# Patient Record
Sex: FEMALE | Race: WHITE | NOT HISPANIC OR LATINO | Employment: FULL TIME | URBAN - METROPOLITAN AREA
[De-identification: names, ages, dates, MRNs, and addresses within clinical notes are randomized per-mention and may not be internally consistent; named-entity substitution may affect disease eponyms.]

---

## 2017-05-10 ENCOUNTER — GENERIC CONVERSION - ENCOUNTER (OUTPATIENT)
Dept: OTHER | Facility: OTHER | Age: 47
End: 2017-05-10

## 2017-05-10 ENCOUNTER — APPOINTMENT (OUTPATIENT)
Dept: RADIATION ONCOLOGY | Facility: CLINIC | Age: 47
End: 2017-05-10
Attending: RADIOLOGY
Payer: COMMERCIAL

## 2017-05-10 PROCEDURE — 99213 OFFICE O/P EST LOW 20 MIN: CPT | Performed by: RADIOLOGY

## 2017-06-27 ENCOUNTER — ALLSCRIPTS OFFICE VISIT (OUTPATIENT)
Dept: OTHER | Facility: OTHER | Age: 47
End: 2017-06-27

## 2017-06-27 DIAGNOSIS — C50.411 MALIGNANT NEOPLASM OF UPPER-OUTER QUADRANT OF RIGHT FEMALE BREAST (HCC): ICD-10-CM

## 2017-06-27 DIAGNOSIS — Z13.220 ENCOUNTER FOR SCREENING FOR LIPOID DISORDERS: ICD-10-CM

## 2017-06-27 DIAGNOSIS — Z71.3 DIETARY COUNSELING AND SURVEILLANCE: ICD-10-CM

## 2017-06-27 DIAGNOSIS — Z00.00 ENCOUNTER FOR GENERAL ADULT MEDICAL EXAMINATION WITHOUT ABNORMAL FINDINGS: ICD-10-CM

## 2017-06-27 DIAGNOSIS — Z13.21 ENCOUNTER FOR SCREENING FOR NUTRITIONAL DISORDER: ICD-10-CM

## 2017-06-27 DIAGNOSIS — Z13.29 ENCOUNTER FOR SCREENING FOR OTHER SUSPECTED ENDOCRINE DISORDER: ICD-10-CM

## 2017-06-30 ENCOUNTER — APPOINTMENT (OUTPATIENT)
Dept: LAB | Facility: CLINIC | Age: 47
End: 2017-06-30
Payer: COMMERCIAL

## 2017-06-30 DIAGNOSIS — Z13.21 ENCOUNTER FOR SCREENING FOR NUTRITIONAL DISORDER: ICD-10-CM

## 2017-06-30 DIAGNOSIS — Z00.00 ENCOUNTER FOR GENERAL ADULT MEDICAL EXAMINATION WITHOUT ABNORMAL FINDINGS: ICD-10-CM

## 2017-06-30 DIAGNOSIS — Z13.29 ENCOUNTER FOR SCREENING FOR OTHER SUSPECTED ENDOCRINE DISORDER: ICD-10-CM

## 2017-06-30 DIAGNOSIS — Z71.3 DIETARY COUNSELING AND SURVEILLANCE: ICD-10-CM

## 2017-06-30 DIAGNOSIS — Z13.220 ENCOUNTER FOR SCREENING FOR LIPOID DISORDERS: ICD-10-CM

## 2017-06-30 DIAGNOSIS — C50.411 MALIGNANT NEOPLASM OF UPPER-OUTER QUADRANT OF RIGHT FEMALE BREAST (HCC): ICD-10-CM

## 2017-06-30 LAB
25(OH)D3 SERPL-MCNC: 26.9 NG/ML (ref 30–100)
ALBUMIN SERPL BCP-MCNC: 4 G/DL (ref 3.5–5)
ALP SERPL-CCNC: 73 U/L (ref 46–116)
ALT SERPL W P-5'-P-CCNC: 36 U/L (ref 12–78)
ANION GAP SERPL CALCULATED.3IONS-SCNC: 4 MMOL/L (ref 4–13)
AST SERPL W P-5'-P-CCNC: 20 U/L (ref 5–45)
BILIRUB SERPL-MCNC: 0.61 MG/DL (ref 0.2–1)
BUN SERPL-MCNC: 14 MG/DL (ref 5–25)
CALCIUM SERPL-MCNC: 9.1 MG/DL (ref 8.3–10.1)
CHLORIDE SERPL-SCNC: 103 MMOL/L (ref 100–108)
CHOLEST SERPL-MCNC: 203 MG/DL (ref 50–200)
CO2 SERPL-SCNC: 30 MMOL/L (ref 21–32)
CREAT SERPL-MCNC: 0.62 MG/DL (ref 0.6–1.3)
ERYTHROCYTE [DISTWIDTH] IN BLOOD BY AUTOMATED COUNT: 13.1 % (ref 11.6–15.1)
EST. AVERAGE GLUCOSE BLD GHB EST-MCNC: 105 MG/DL
GFR SERPL CREATININE-BSD FRML MDRD: >60 ML/MIN/1.73SQ M
GLUCOSE P FAST SERPL-MCNC: 95 MG/DL (ref 65–99)
HBA1C MFR BLD: 5.3 % (ref 4.2–6.3)
HCT VFR BLD AUTO: 40.9 % (ref 34.8–46.1)
HDLC SERPL-MCNC: 102 MG/DL (ref 40–60)
HGB BLD-MCNC: 13.9 G/DL (ref 11.5–15.4)
LDLC SERPL CALC-MCNC: 88 MG/DL (ref 0–100)
MAGNESIUM SERPL-MCNC: 2.4 MG/DL (ref 1.6–2.6)
MCH RBC QN AUTO: 31.5 PG (ref 26.8–34.3)
MCHC RBC AUTO-ENTMCNC: 34 G/DL (ref 31.4–37.4)
MCV RBC AUTO: 93 FL (ref 82–98)
PLATELET # BLD AUTO: 288 THOUSANDS/UL (ref 149–390)
PMV BLD AUTO: 10.8 FL (ref 8.9–12.7)
POTASSIUM SERPL-SCNC: 4.1 MMOL/L (ref 3.5–5.3)
PROT SERPL-MCNC: 7.6 G/DL (ref 6.4–8.2)
RBC # BLD AUTO: 4.41 MILLION/UL (ref 3.81–5.12)
SODIUM SERPL-SCNC: 137 MMOL/L (ref 136–145)
T3 SERPL-MCNC: 1.2 NG/ML (ref 0.6–1.8)
TRIGL SERPL-MCNC: 66 MG/DL
TSH SERPL DL<=0.05 MIU/L-ACNC: 2.29 UIU/ML (ref 0.36–3.74)
WBC # BLD AUTO: 5.54 THOUSAND/UL (ref 4.31–10.16)

## 2017-06-30 PROCEDURE — 83735 ASSAY OF MAGNESIUM: CPT

## 2017-06-30 PROCEDURE — 84443 ASSAY THYROID STIM HORMONE: CPT

## 2017-06-30 PROCEDURE — 83036 HEMOGLOBIN GLYCOSYLATED A1C: CPT

## 2017-06-30 PROCEDURE — 80061 LIPID PANEL: CPT

## 2017-06-30 PROCEDURE — 82306 VITAMIN D 25 HYDROXY: CPT

## 2017-06-30 PROCEDURE — 86376 MICROSOMAL ANTIBODY EACH: CPT

## 2017-06-30 PROCEDURE — 85027 COMPLETE CBC AUTOMATED: CPT

## 2017-06-30 PROCEDURE — 80053 COMPREHEN METABOLIC PANEL: CPT

## 2017-06-30 PROCEDURE — 36415 COLL VENOUS BLD VENIPUNCTURE: CPT

## 2017-06-30 PROCEDURE — 86800 THYROGLOBULIN ANTIBODY: CPT

## 2017-06-30 PROCEDURE — 84480 ASSAY TRIIODOTHYRONINE (T3): CPT

## 2017-07-01 LAB
THYROGLOB AB SERPL-ACNC: <1 IU/ML (ref 0–0.9)
THYROPEROXIDASE AB SERPL-ACNC: 9 IU/ML (ref 0–34)

## 2017-07-03 ENCOUNTER — GENERIC CONVERSION - ENCOUNTER (OUTPATIENT)
Dept: OTHER | Facility: OTHER | Age: 47
End: 2017-07-03

## 2017-08-16 ENCOUNTER — ALLSCRIPTS OFFICE VISIT (OUTPATIENT)
Dept: OTHER | Facility: OTHER | Age: 47
End: 2017-08-16

## 2017-09-01 ENCOUNTER — HOSPITAL ENCOUNTER (OUTPATIENT)
Dept: MAMMOGRAPHY | Facility: HOSPITAL | Age: 47
Discharge: HOME | End: 2017-09-01
Attending: SURGERY
Payer: COMMERCIAL

## 2017-09-01 DIAGNOSIS — Z12.31 ENCOUNTER FOR SCREENING MAMMOGRAM FOR MALIGNANT NEOPLASM OF BREAST: ICD-10-CM

## 2017-09-01 PROCEDURE — G0202 SCR MAMMO BI INCL CAD: HCPCS

## 2017-09-05 ENCOUNTER — ALLSCRIPTS OFFICE VISIT (OUTPATIENT)
Dept: OTHER | Facility: OTHER | Age: 47
End: 2017-09-05

## 2017-09-06 ENCOUNTER — GENERIC CONVERSION - ENCOUNTER (OUTPATIENT)
Dept: OTHER | Facility: OTHER | Age: 47
End: 2017-09-06

## 2017-09-06 ENCOUNTER — TRANSCRIBE ORDERS (OUTPATIENT)
Dept: ADMINISTRATIVE | Facility: HOSPITAL | Age: 47
End: 2017-09-06

## 2017-09-06 DIAGNOSIS — Z12.31 VISIT FOR SCREENING MAMMOGRAM: Primary | ICD-10-CM

## 2017-12-20 ENCOUNTER — ALLSCRIPTS OFFICE VISIT (OUTPATIENT)
Dept: OTHER | Facility: OTHER | Age: 47
End: 2017-12-20

## 2017-12-21 NOTE — PROGRESS NOTES
Assessment   1  Cancer of overlapping sites of right breast (174 8) ([id number])    Discussion/Summary   Discussion Summary:    I suspect that this may be related to her mammogram causing some compression of the scar with subsequent discomfort  The patient relates that she typically has pain in her right breast after the mammogram for approximately 2 weeks  I have recommended the patient to contact us should she appreciate any changes or recurrent pain in her breast       Chief Complaint   Chief Complaint Free Text Note Form: Follow up with new concern of pain in breast   mammogram performed on 9/1/2017, birads 1  History of Present Illness   Diagnosis and Staging: July 2011: Locally advanced right breast invasive ductal carcinoma, Stage III, complete pathologic response 2012 negative, NM negative, HER-2 positive, Mammaprint high-risk                  Treatment History: 2011: Neoadjuvant dose dense Adriamycin and Cytoxan followed by paclitaxel followed by Herceptin Right lumpectomy with axillary dissection                  Current Therapy: Observation             Interval History: Starting shortly after her mammogram the patient began experiencing occasional sharp pain similar to postoperatively  These have now resolved  She is not appreciate anything on clinical exam  She had already scheduled the appointment so she decided to keep it  Review of Systems   Complete Female ROS SurgOnc:      Constitutional: The patient denies new or recent history of general fatigue, no recent weight loss, no change in appetite  Eyes: No complaints of visual problems, no scleral icterus  ENT: no complaints of ear pain, no hoarseness, no difficulty swallowing,-- no tinnitus-- and-- no new masses in head, oral cavity, or neck  Cardiovascular: No complaints of chest pain, no palpitations, no ankle edema  Respiratory: No complaints of shortness of breath, no cough        Gastrointestinal: No complaints of jaundice, no bloody stools, no pale stools  Genitourinary: No complaints of dysuria, no hematuria, no nocturia, no frequent urination, no urethral discharge  Musculoskeletal: No complaints of weakness, paralysis, joint stiffness or arthralgias,  Integumentary: No complaints of rash, no new lesions  Neurological: No complaints of convulsions, no seizures, no dizziness  Hematologic/Lymphatic: No complaints of easy bruising  ROS Reviewed:    ROS reviewed  Active Problems   1  History of Axillary Lymphadenectomy   2  Cancer of overlapping sites of right breast (174 8) (C50 811)   3  History of Carcinoma of right breast, estrogen receptor negative (174 9,V86 1)     (C50 911,Z17 1)   4  Cervical dysplasia (622 10) (N87 9)   5  Encounter for follow-up surveillance of breast cancer (V67 9,V10 3) (V39,N47 1)   6  Encounter for gynecological examination (V72 31) (Z01 419)   7  Encounter for lipid screening for cardiovascular disease (V77 91,V81 2) (Z13 220,Z13 6)   8  Encounter for vitamin deficiency screening (V77 99) (Z13 21)   9  Leiomyoma of uterus (218 9) (D25 9)   10  Lichen sclerosus et atrophicus (701 0) (L90 0)   11  History of Malignant neoplasm of upper-outer quadrant of right female breast (174 4)      (C50 411)   12  Screening for hypothyroidism (V77 0) (Z13 29)   13  Visit for screening mammogram (V76 12) (Z12 31)   14  Weight loss counseling, encounter for (V65 3) (Z71 3)    Past Medical History   1  History of Age At First Period 15 Years Old (Menarche)   2  History of Bacterial vaginosis (616 10,041 9) (N76 0,B96 89)   3  History of Carcinoma of right breast, estrogen receptor negative (174 9,V86 1)     (C50 911,Z17 1)   4  History of Encounter for screening mammogram for malignant neoplasm of breast     (V76 12) (Z12 31)   5  History of backache (V13 59) (Z87 39)   6  History of malignant neoplasm of female breast (V10 3) (Z85 3)   7   History of radiation therapy (V15 3) (Z92 3)   8  History of senile atrophic vaginitis (V13 29) (Z87 42)   9  History of vaginal discharge (V13 29) (Z87 42)   10  History of vaginitis (V13 29) (Z87 42)   11  History of vaginitis (V13 29) (Z87 42)   12  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)   13  History of Malignant neoplasm of upper-outer quadrant of right female breast (174 4)      (C50 411)   14  History of Missed  (632) (O02 1)   15  History of Radiation Therapy (V15 3)   16  History of Stress incontinence, female (625 6) (N39 3)   17  History of Summary Of Previous Pregnancies  1  (Total No )   18  History of Summary Of Previous Pregnancies Para 0  (Deliveries)   19  History of Vaginal candidiasis (112 1) (B37 3)    Surgical History   1  History of Axillary Lymphadenectomy   2  History of Chemotherapeutics (V87 41)   3  History of Hysterectomy   4  History of Laparoscopy With Total Hysterectomy   · Perfomed in  for fibroids   5  History of Right Breast Lumpectomy   · 12  Surgical History Reviewed: The surgical history was reviewed and updated today  Family History   Mother    1  Family history of Coronary Artery Disease (V17 49)   2  Family history of Polyps Of The Sigmoid Colon  Father    3  Family history of Polyps Of The Sigmoid Colon  Maternal Grandmother    4  Family history of Skin Cancer (V16 8)  Maternal Grandfather    5  Family history of Colon Cancer (V16 0)   6  Family history of Polyps Of The Sigmoid Colon  Paternal Grandfather    7  Family history of Coronary Artery Disease (V17 49)   8  Family history of Diabetes Mellitus (V18 0)   9  Family history of Polyps Of The Sigmoid Colon  Family History Reviewed: The family history was reviewed and updated today  Social History    · Being A Social Drinker   · Marital History - Single   · Never A Smoker  Social History Reviewed: The social history was reviewed and is unchanged  Current Meds    1   Clobetasol Propionate 0 05 % External Cream; APPLY SPARINGLY TO AFFECTED     AREA(S) TWICE DAILY; Therapy: [id number] to (Last Rx:[id number])  Requested for: [id number] Ordered  Medication List Reviewed: The medication list was reviewed and updated today  Allergies   1  Augmentin TABS   2  Shellfish-derived Products   3  Tetracyclines  4  Adhesive Tape    Vitals   Vital Signs    Recorded: 20Dec2017 09:05AM   Temperature 97 7 F   Heart Rate 86   Respiration 16   Systolic 104   Diastolic 82   Height 4 ft 11 2 in   Weight 143 lb    BMI Calculated 28 69   BSA Calculated 1 6   O2 Saturation 98     Physical Exam        Constitutional: General appearance: The Patient is well-developed, well-nourished female who appears her stated age in no acute distress  She is pleasant and talkative  HEENT: Sclerae are anicteric  -- Mucous membranes are moist  -- Neck is supple without adenopathy  No JVD  Chest: The lungs are clear to auscultation  Cardiac: Heart is regular rate  Abdomen: Abdomen is soft, nontender without masses  Extremities: There is no clubbing or cyanosis  -- There is no edema  Neuro: Grossly nonfocal  -- Gait is normal        Lymphatic: no evidence of cervical adenopathy bilaterally  -- no evidence of axillary adenopathy bilaterally  Skin: Warm, anicteric  Additional Exam:   breasts were examined in the sitting and supine position  There are no worrisome skin lesions or nipple discharge on either side  There are no dominant masses, axillary adenopathy or supraclavicular adenopathy on either side  I do not appreciate any changes in the breast on the right side which is consistent with the patient's exam           Future Appointments      Date/Time Provider Specialty Site   09/07/2018 08:00 AM Selkregg, Marylen Corp, CRNP Surgical Oncology [facility]     End of Encounter Meds   1   Clobetasol Propionate 0 05 % External Cream; APPLY SPARINGLY TO AFFECTED     AREA(S) TWICE DAILY;      Therapy: [id number] to (Last Rx:[id number])  Requested for: [id number] Ordered    Signatures    Electronically signed by : Andre Frederick MD; Dec 20 2017  9:23AM EST                       (Author)

## 2018-01-10 NOTE — PROGRESS NOTES
Assessment    1  Cancer of overlapping sites of right breast (174 8) (C50 811)   2  Lichen sclerosus et atrophicus (701 0) (L90 0)   3  Encounter for gynecological examination (V72 31) (P01 881)    Plan  Lichen sclerosus et atrophicus    · Clobetasol Propionate 0 05 % External Cream; APPLY SPARINGLY TO  AFFECTED AREA(S) TWICE DAILY   Rx By: Elaine Garza; Dispense: 0 Days ; #:1 X 45 GM Tube; Refill: 2; For: Lichen sclerosus et atrophicus; CONSTANCE = N; Verified Transmission to [address] [place] [address] [address]; Last Updated By: [employer]; 8/16/2017 4:23:38 PM    Discussion/Summary  Breast cancer screening: breast cancer screening is managed by Dr Karry Phoenix  Chief Complaint  annual exam      History of Present Illness  HPI: hx St III Rt breast Ca, ER/ID-, HER2 +  No c/o  Prior TLH      Review of Systems    Constitutional: No fever, no chills, feels well, no tiredness, no recent weight gain or loss  Breasts: no breast pain, no breast swelling, no reddening of the breast, no breast lump, no breast itching and no nipple discharge  Gastrointestinal: no complaints of abdominal pain, no constipation, no nausea or diarrhea, no vomiting, no bloody stools  Genitourinary: no complaints of dysuria, no incontinence, no pelvic pain, no dysmenorrhea, no vaginal discharge or abnormal vaginal bleeding  Active Problems    1  History of Axillary Lymphadenectomy   2  Cancer of overlapping sites of right breast (174 8) (C50 811)   3  Carcinoma of breast upper outer quadrant, right (174 4) (C50 411)   4  Cervical dysplasia (622 10) (N87 9)   5  Encounter for lipid screening for cardiovascular disease (V77 91,V81 2) (Z13 220,Z13 6)   6  Encounter for vitamin deficiency screening (V77 99) (Z13 21)   7  Leiomyoma of uterus (218 9) (D25 9)   8  Lichen sclerosus et atrophicus (701 0) (L90 0)   9  Screening for hypothyroidism (V77 0) (Z13 29)   10  Visit for screening mammogram (V76 12) (Z12 31)   11   Weight loss counseling, encounter for (V65 3) (Z71 3)    Past Medical History    · History of Age At First Period 15 Years Old (Menarche)   · History of Bacterial vaginosis (616 10,041 9) (N76 0,B96 89)   · History of Encounter for screening mammogram for malignant neoplasm of breast  (V76 12) (Z12 31)   · History of backache (V13 59) (Z87 39)   · History of malignant neoplasm of female breast (V10 3) (Z85 3)   · History of radiation therapy (V15 3) (Z92 3)   · History of senile atrophic vaginitis (V13 29) (Z87 42)   · History of vaginal discharge (V13 29) (Z87 42)   · History of vaginitis (V13 29) (Z87 42)   · History of vaginitis (V13 29) (Z87 42)   · 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)   · History of Missed  (632) (O02 1)   · History of Radiation Therapy (V15 3)   · History of Stress incontinence, female (625 6) (N39 3)   · History of Summary Of Previous Pregnancies  1  (Total No )   · History of Summary Of Previous Pregnancies Para 0  (Deliveries)   · History of Vaginal candidiasis (112 1) (B37 3)    Surgical History    · History of Axillary Lymphadenectomy   · History of Chemotherapeutics (V87 41)   · History of Hysterectomy   · History of Laparoscopy With Total Hysterectomy   · History of Right Breast Lumpectomy    Family History  Mother    · Family history of Coronary Artery Disease (V17 49)   · Family history of Polyps Of The Sigmoid Colon  Father    · Family history of Polyps Of The Sigmoid Colon  Maternal Grandmother    · Family history of Skin Cancer (V16 8)  Maternal Grandfather    · Family history of Colon Cancer (V16 0)   · Family history of Polyps Of The Sigmoid Colon  Paternal Grandfather    · Family history of Coronary Artery Disease (V17 49)   · Family history of Diabetes Mellitus (V18 0)   · Family history of Polyps Of The Sigmoid Colon    Social History    · Being A Social Drinker   · Marital History - Single   · Never A Smoker    Allergies    1   Augmentin TABS 2  Shellfish-derived Products   3  Tetracyclines    4  Adhesive Tape    Vitals   Recorded: 81GTI0644 33:48SN   Systolic 876   Diastolic 80   Height 4 ft 11 2 in   Weight 141 lb 6 oz   BMI Calculated 28 36   BSA Calculated 1 6   LMP TLH     Physical Exam    Constitutional   General appearance: No acute distress, well appearing and well nourished  Neck   Neck: Normal, supple, trachea midline, no masses  Thyroid: Normal, no thyromegaly  Pulmonary   Respiratory effort: No increased work of breathing or signs of respiratory distress  Auscultation of lungs: Clear to auscultation  Cardiovascular   Auscultation of heart: Normal rate and rhythm, normal S1 and S2, no murmurs  Peripheral vascular exam: Normal pulses Throughout  Genitourinary   External genitalia: Normal and no lesions appreciated  Vagina: Normal, no lesions or dryness appreciated  Urethra: Normal     Urethral meatus: Normal     Bladder: Normal, soft, non-tender and no prolapse or masses appreciated  Cervix: Normal, no palpable masses  Uterus: Surgically absent  Adnexa/parametria: Normal, non-tender and no fullness or masses appreciated  Chest   Breasts: Normal and no dimpling or skin changes noted  s/p Rt lumpectomy  Abdomen   Abdomen: Normal, non-tender, and no organomegaly noted  Liver and spleen: No hepatomegaly or splenomegaly  Examination for hernias: No hernias appreciated  Lymphatic   Palpation of lymph nodes in neck, axillae, groin and/or other locations: No lymphadenopathy or masses noted  Psychiatric   Orientation to person, place, and time: Normal     Mood and affect: Normal        Future Appointments    Date/Time Provider Specialty Site   09/05/2017 02:00 PM LISA Fernando   H[facility]ology Oncology [facility] [place]   09/06/2017 08:00 AM Pily Yates MD Surgical Oncology [facility]     Signatures   Electronically signed by : Christa Wilder DO; Aug 17 2017  8:01AM EST                       (Author)

## 2018-01-12 VITALS
RESPIRATION RATE: 16 BRPM | HEART RATE: 65 BPM | BODY MASS INDEX: 28.86 KG/M2 | DIASTOLIC BLOOD PRESSURE: 70 MMHG | SYSTOLIC BLOOD PRESSURE: 118 MMHG | HEIGHT: 60 IN | OXYGEN SATURATION: 99 % | WEIGHT: 147 LBS

## 2018-01-13 VITALS
BODY MASS INDEX: 28.5 KG/M2 | DIASTOLIC BLOOD PRESSURE: 80 MMHG | WEIGHT: 141.38 LBS | HEIGHT: 59 IN | SYSTOLIC BLOOD PRESSURE: 120 MMHG

## 2018-01-13 NOTE — PROGRESS NOTES
Assessment    1  Breast cancer, female (174 9) (C50 919)    Plan  Breast cancer, female    · Follow-up visit in 6 months Evaluation and Treatment  Follow-up  Status: Hold For -  Scheduling  Requested for: [id number]   Ordered; For: Breast cancer, female; Ordered By: Luz Elena Llamas Performed:  Due: 80JPQ8020    Discussion/Summary  Discussion Summary:   A 39year old premenopausal woman with locally advanced right breast cancer, ER/NV negative HER-2 3+ disease, diagnosed in July 2011  She underwent neoadjuvant chemotherapy with a c  followed by paclitaxel and Herceptin resulting in complete pathological response followed by lumpectomy with axillary lymph node dissection, resulting in the CLEMENTINE  She is currently on observation  She has no evidence of recurrent disease, based on her symptomatology and physical examination  I recommended her to continue with surveillance  Regarding vaginal atrophy, this is likely to be related to her prior systemic chemotherapy  As well as resort, she could use low-dose estrogen-cream, since she had ER negative breast cancer  I will see her again in 6 months for routine followup  She is in agreement with my recommendations  Chief Complaint  Chief Complaint: Chief Complaint:   The patient presents to the office today with 1  Locally advanced right breast cancer, ER/NV negative, HER-2 (3+) disease diagnosed in July 2011    2  BRCA mutation negative  History of Present Illness  Previous Therapy:   1  Neoadjuvant chemotherapy with dose-dense AC x4 followed by weekly paclitaxel and Herceptin x12    2  Adjuvant Herceptin 6 mg/kg x13 cycles, completed in December 2012  Current Therapy: Observation   Disease Status: 1  Complete pathologic response to the neoadjuvant chemotherapy  2  CLEMENTINE status post lumpectomy with axillary lymph node dissection February 2012     Interval History: A 27-year-old premenopausal woman with locally advanced right breast cancer with ER/NV negative HER-2 3+ disease diagnosed in July 2011  She underwent neoadjuvant chemotherapy with a c  followed by paclitaxel and Herceptin resulting in complete pathological response  Since she was negative for BRCA mutation, she chose to have lumpectomy followed by radiation therapy  She is currently on observation  She came in today for a routine followup  She had relatively frequent vaginal infection, due to the atrophy  She was recommended to use estrogen-containing, which she declined  She is using alternative natural treatment with which she is doing well  Otherwise, she has no new complaints  She denied bone pain  She has minimal hot flashes  Her weight is stable  She has no respiratory symptoms such as cough, sputum production or shortness of breath  Her last mammography in August 2015 was benign  Review of Systems  Complete-Female:   Constitutional: No fever, no chills, feels well, no tiredness, no recent weight gain or weight loss  Eyes: No complaints of eye pain, no red eyes, no eyesight problems, no discharge, no dry eyes, no itching of eyes  ENT: no complaints of earache, no loss of hearing, no nose bleeds, no nasal discharge, no sore throat, no hoarseness  Cardiovascular: No complaints of slow heart rate, no fast heart rate, no chest pain, no palpitations, no leg claudication, no lower extremity edema  Respiratory: No complaints of shortness of breath, no wheezing, no cough, no SOB on exertion, no orthopnea, no PND  Gastrointestinal: No complaints of abdominal pain, no constipation, no nausea or vomiting, no diarrhea, no bloody stools  Genitourinary: No complaints of dysuria, no incontinence, no pelvic pain, no dysmenorrhea, no vaginal discharge or bleeding  Musculoskeletal: No complaints of arthralgias, no myalgias, no joint swelling or stiffness, no limb pain or swelling  Integumentary: No complaints of skin rash or lesions, no itching, no skin wounds, no breast pain or lump     Neurological: No complaints of headache, no confusion, no convulsions, no numbness, no dizziness or fainting, no tingling, no limb weakness, no difficulty walking  Psychiatric: Not suicidal, no sleep disturbance, no anxiety or depression, no change in personality, no emotional problems  Endocrine: No complaints of proptosis, no hot flashes, no muscle weakness, no deepening of the voice, no feelings of weakness  Hematologic/Lymphatic: No complaints of swollen glands, no swollen glands in the neck, does not bleed easily, does not bruise easily  ROS Reviewed:   ROS reviewed  Active Problems    1  Atrophic vaginitis (627 3) (N95 2)   2  History of Axillary Lymphadenectomy   3  Backache (724 5) (M54 9)   4  Breast cancer, female (174 9) (C50 919)   5  Cervical dysplasia (622 10) (N87 9)   6  Leiomyoma of uterus (218 9) (D25 9)   7  Lichen sclerosus et atrophicus (701 0) (L90 0)   8  Vaginal candidiasis (112 1) (B37 3)   9  Vaginal discharge (623 5) (N89 8)   10  Vaginitis (616 10) (N76 0)    Past Medical History    1  History of Age At First Period 15 Years Old (Menarche)   2  History of Bacterial vaginosis (616 10,041 9) (N76 0,A49 9)   3  History of malignant neoplasm of female breast (V10 3) (Z85 3)   4  History of vaginitis (V13 29) (Z87 42)   5  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)   6  History of Missed  (632) (O02 1)   7  History of Summary Of Previous Pregnancies  1  (Total No )   8  History of Summary Of Previous Pregnancies Para 0  (Deliveries)    Surgical History    1  History of Axillary Lymphadenectomy   2  History of Chemotherapeutics (V87 41)   3  History of Hysterectomy   4  History of Radiation Therapy    Family History    1  Family history of Coronary Artery Disease (V17 49)   2  Family history of Polyps Of The Sigmoid Colon    3  Family history of Polyps Of The Sigmoid Colon    4  Family history of Skin Cancer (V16 8)    5   Family history of Colon Cancer (V16 0) 6  Family history of Polyps Of The Sigmoid Colon    7  Family history of Coronary Artery Disease (V17 49)   8  Family history of Diabetes Mellitus (V18 0)   9  Family history of Polyps Of The Sigmoid Colon    Social History    · Being A Social Drinker   · Marital History - Single   · Never A Smoker    Current Meds   1  No Reported Medications Recorded    Allergies    1  Augmentin TABS   2  Shellfish-derived Products   3  Tetracyclines    4  Adhesive Tape    Vitals  Vital Signs [Data Includes: Current Encounter]    Recorded: 42HOQ7275 08:15AM   Temperature 97 1 F   Heart Rate 78   Respiration 18   Systolic 015   Diastolic 74   Height 4 ft 11 5 in   Weight 146 lb    BMI Calculated 29   BSA Calculated 1 62     Physical Exam    Constitutional   General appearance: No acute distress, well appearing and well nourished  Eyes   Conjunctiva and lids: No swelling, erythema or discharge  Pupils and irises: Equal, round and reactive to light  Ears, Nose, Mouth, and Throat   External inspection of ears and nose: Normal     Otoscopic examination: Tympanic membranes translucent with normal light reflex  Canals patent without erythema  Nasal mucosa, septum, and turbinates: Normal without edema or erythema  Oropharynx: Normal with no erythema, edema, exudate or lesions  Pulmonary   Respiratory effort: No increased work of breathing or signs of respiratory distress  Auscultation of lungs: Clear to auscultation  Cardiovascular   Palpation of heart: Normal PMI, no thrills  Auscultation of heart: Normal rate and rhythm, normal S1 and S2, without murmurs  Examination of extremities for edema and/or varicosities: Normal     Carotid pulses: Normal     Abdomen   Abdomen: Non-tender, no masses  Liver and spleen: No hepatomegaly or splenomegaly  Lymphatic   Palpation of lymph nodes in neck: No lymphadenopathy      Musculoskeletal   Gait and station: Normal     Digits and nails: Normal without clubbing or cyanosis  Inspection/palpation of joints, bones, and muscles: Normal     Skin   Skin and subcutaneous tissue: Normal without rashes or lesions  Neurologic   Cranial nerves: Cranial nerves 2-12 intact  Reflexes: 2+ and symmetric  Sensation: No sensory loss  Psychiatric   Orientation to person, place, and time: Normal     Mood and affect: Normal     Additional Exam:  Breast exam; lumpectomy scar in the outer upper quadrant of the right breast with no palpable abnormalities  Left breast exam is negative  Diabetic Foot Screen: Normal       ECOG 0       Results/Data  Results Form:   Results   Radiology CT scan of abdomen and pelvis in November 2014 showed no evidence of disease  Mammography in August 2015 was benign, BI-RAD 2        Future Appointments    Date/Time Provider Specialty Site   03/03/2016 08:00 AM Arlette Jones MD Surgical Oncology [facility]     Signatures   Electronically signed by : LISA Gutierrez ; Jan 19 2016  8:35AM EST                       (Author)

## 2018-01-13 NOTE — RESULT NOTES
Discussion/Summary   all blood work is acceptable  all thyroid studies normal  vit d slightly low=26  multi vit should be enough     Verified Results  (1) COMPREHENSIVE METABOLIC PANEL [id number] 15:92MH Rose Rose    Order Number: [id number]     Test Name Result Flag Reference   SODIUM 137 mmol/L  136-145   POTASSIUM 4 1 mmol/L  3 5-5 3   CHLORIDE 103 mmol/L  100-108   CARBON DIOXIDE 30 mmol/L  21-32   ANION GAP (CALC) 4 mmol/L  4-13   BLOOD UREA NITROGEN 14 mg/dL  5-25   CREATININE 0 62 mg/dL  0 60-1 30   Standardized to IDMS reference method   CALCIUM 9 1 mg/dL  8 3-10 1   BILI, TOTAL 0 61 mg/dL  0 20-1 00   ALK PHOSPHATAS 73 U/L     ALT (SGPT) 36 U/L  12-78   AST(SGOT) 20 U/L  5-45   ALBUMIN 4 0 g/dL  3 5-5 0   TOTAL PROTEIN 7 6 g/dL  6 4-8 2   eGFR Non-African American      >60 0 ml/min/1 73sq [facility] [employer] Disease Education Program recommendations are as follows:  GFR calculation is accurate only with a steady state creatinine  Chronic Kidney disease less than 60 ml/min/1 73 sq  meters  Kidney failure less than 15 ml/min/1 73 sq  meters  GLUCOSE FASTING 95 mg/dL  65-99     (1) LIPID PANEL, FASTING 30Jun2017 08:29AM Jemima Bellamyabhay Order Number: [id number]     Test Name Result Flag Reference   CHOLESTEROL 203 mg/dL H    HDL,DIRECT 102 mg/dL H 40-60   Specimen collection should occur prior to Metamizole administration due to the potential for falsely depressed results  LDL CHOLESTEROL CALCULATED 88 mg/dL  0-100   Triglyceride:         Normal              <150 mg/dl       Borderline High    150-199 mg/dl       High               200-499 mg/dl       Very High          >499 mg/dl  Cholesterol:         Desirable        <200 mg/dl      Borderline High  200-239 mg/dl      High             >239 mg/dl  HDL Cholesterol:        High    >59 mg/dL      Low     <41 mg/dL  LDL CALCULATED:    This screening LDL is a calculated result    It does not have the accuracy of the Direct Measured LDL in the monitoring of patients with hyperlipidemia and/or statin therapy  Direct Measure LDL (IBJ187) must be ordered separately in these patients  TRIGLYCERIDES 66 mg/dL  <=150   Specimen collection should occur prior to N-Acetylcysteine or Metamizole administration due to the potential for falsely depressed results  (1) CBC/ PLT (NO DIFF) [id number] 08:29AM [facility] Order Number: [id number]     Test Name Result Flag Reference   HEMATOCRIT 40 9 %  34 8-46 1   HEMOGLOBIN 13 9 g/dL  11 5-15 4   MCHC 34 0 g/dL  31 4-37 4   MCH 31 5 pg  26 8-34 3   MCV 93 fL  82-98   PLATELET COUNT 536 Thousands/uL  149-390   RBC COUNT 4 41 Million/uL  3 81-5 12   RDW 13 1 %  11 6-15 1   WBC COUNT 5 54 Thousand/uL  4 31-10 16   MPV 10 8 fL  8 9-12 7     (1) VITAMIN D 25-HYDROXY 30Jun2017 08:29AM Mg Dhillon    Order Number: [id number]     Test Name Result Flag Reference   VIT D 25-HYDROX 26 9 ng/mL L 30 0-100 0   This assay is a certified procedure of the CDC Vitamin D Standardization Certification Program (VDSCP)     Deficiency <20ng/ml   Insufficiency 20-30ng/ml   Sufficient  ng/ml     *Patients undergoing fluorescein dye angiography may retain small amounts of fluorescein in the body for 48-72 hours post procedure  Samples containing fluorescein can produce falsely elevated Vitamin D values  If the patient had this procedure, a specimen should be resubmitted post fluorescein clearance  (1) HEMOGLOBIN A1C 30Jun2017 08:29AM [facility] Order Number: [id number]     Test Name Result Flag Reference   HEMOGLOBIN A1C 5 3 %  4 2-6 3   EST  AVG   GLUCOSE 105 mg/dl       (1) TSH WITH FT4 REFLEX 30Jun2017 08:29AM Mg Dhillon    Order Number: [id number]     Test Name Result Flag Reference   TSH 2 290 uIU/mL  0 358-3 740   Patients undergoing fluorescein dye angiography may retain small amounts of fluorescein in the body for 48-72 hours post procedure  Samples containing fluorescein can produce falsely depressed TSH values  If the patient had this procedure,a specimen should be resubmitted post fluorescein clearance            The recommended reference ranges for TSH during pregnancy are as follows:  First trimester 0 1 to 2 5 uIU/mL  Second trimester  0 2 to 3 0 uIU/mL  Third trimester 0 3 to 3 0 uIU/m     (1) T3 TOTAL 30Jun2017 08:29AM Sindi WOODY Order Number: [id number]     Test Name Result Flag Reference   T3 1 2 ng/mL  0 6-1 8     (1) THYROID ANTIBODIES PANEL 30Jun2017 08:29AM Ashok Godinez Order Number: [id number]     Test Name Result Flag Reference   THYROGLOB AB <1 0 IU/mL  0 0 - 0 9   Thyroglobulin Antibody measured by [facility]    Performed at:  [facility] [address]  [id number]  : Darlyn Cancino MD, Phone:  [phone]   [facility] IU/mL  0 - 34   Performed at:  [facility] [address]  [id number]  : Darlyn Cancino MD, Phone:  [phone]     (1) MAGNESIUM 20DOM1886 08:29AM Ashok Godinez Order Number: [id number]     Test Name Result Flag Reference   MAGNESIUM 2 4 mg/dL  1 6-2 6       Signatures   Electronically signed by : VLADIMIR Felipe; Jul  3 2017 11:11AM EST                       (Author)

## 2018-01-14 VITALS
BODY MASS INDEX: 28.63 KG/M2 | HEIGHT: 59 IN | RESPIRATION RATE: 16 BRPM | HEART RATE: 72 BPM | OXYGEN SATURATION: 98 % | WEIGHT: 142 LBS | TEMPERATURE: 98.9 F | DIASTOLIC BLOOD PRESSURE: 82 MMHG | SYSTOLIC BLOOD PRESSURE: 120 MMHG

## 2018-01-15 NOTE — PROGRESS NOTES
Assessment    1  Encounter for preventive health examination (V70 0) (Z00 00)   2  Weight loss counseling, encounter for (V65 3) (Z71 3)   3  Encounter for vitamin deficiency screening (V77 99) (Z13 21)   4  Encounter for lipid screening for cardiovascular disease (V77 91,V81 2) (Z13 220,Z13 6)   5  Screening for hypothyroidism (V77 0) (Z13 29)    Plan  Carcinoma of breast upper outer quadrant, right, Encounter for lipid screening for  cardiovascular disease, Health Maintenance, Encounter for vitamin deficiency screening,  Screening for hypothyroidism, Weight loss counseling, encounter for    · (1) CBC/ PLT (NO DIFF); Status:Active; Requested AHW:[id number];    · (1) COMPREHENSIVE METABOLIC PANEL; Status:Active; Requested TEU:[id number];    · (1) HEMOGLOBIN A1C; Status:Active; Requested FKL:[id number];    · (1) LIPID PANEL, FASTING; Status:Active; Requested LHY:25YRM6796;    · (1) MAGNESIUM; Status:Active; Requested ANIYA:27PSI1841;    · (1) T3 TOTAL; Status:Active; Requested VEC:49GPC5619;    · (1) THYROID ANTIBODIES PANEL; Status:Active; Requested AFO:64MUG1657;    · (1) TSH WITH FT4 REFLEX; Status:Active; Requested LCV:[id number];    · (1) VITAMIN D 25-HYDROXY; Status:Active; Requested KHL:[id number]; Health Maintenance    · Follow Up After Tests Complete Evaluation and Treatment  Follow-up  Status: Hold For -  Scheduling  Requested for: 27Jun2017   · Follow Up in 2 Years Evaluation and Treatment  Follow-up  Status: Hold For - Scheduling   Requested for: 27Jun2017   · Drink plenty of fluids ; Status:Complete;   Done: 04CJG3047   · Eat a low fat and low cholesterol diet ; Status:Complete;   Done: 70LPB4549   · Keep a diary of when and what you eat ; Status:Complete;   Done: 92UJW4274   · Some eating tips that can help you lose weight ; Status:Complete;   Done: 69HNI6281   · There are ways to decrease your stress and improve your sense of well-being  We  encourage you to keep active and exercise regularly    Make time to take care of yourself  and participate in activities that you enjoy  Stay connected to friends and family that can  support and comfort you  If at any time you have thoughts of harming yourself or  someone else, contact us immediately ; Status:Active; Requested JAJA:49WNK4859;    · Vitamins can help you get daily requirements that your diet may not be giving you ;  Status:Complete;   Done: 58BXD4274   · Call [phone] if: You have any warning signs of skin cancer ; Status:Complete;    Done: 22JPO1283  Weight loss counseling, encounter for    · *1 - [facility]  *  Status: Active  Requested  for: 31Fta3813  Care Summary provided  : Yes   · 1 - Batool Hodges MD, Colan Ormond (Internal Medicine) Co-Management  *  Status: Active  Requested  for: 58HKY5344  Care Summary provided  : Yes    Discussion/Summary  health maintenance visit Currently, she eats an adequate diet and has an adequate exercise regimen  the risks and benefits of cervical cancer screening were discussed cervical cancer screening is current cervical cancer screening is managed by Dr Riri Londono Breast cancer screening: the risks and benefits of breast cancer screening were discussed, mammogram is current and breast cancer screening is managed by Dr Sharita Tran  Advice and education were given regarding nutrition, aerobic exercise and weight loss  Patient discussion: discussed with the patient  The patient was counseled regarding instructions for management, impressions, risks and benefits of treatment options, importance of compliance with treatment  The treatment plan was reviewed with the patient/guardian  The patient/guardian understands and agrees with the treatment plan      Chief Complaint  PE see's eye   regularly requesting labs, discuss weight loss  ck/lpn      History of Present Illness  HM, Adult Female: The patient is being seen for a health maintenance evaluation  The last health maintenance visit was unsure  General Health: The patient's health since the last visit is described as good  She has regular dental visits  She denies vision problems  She denies hearing loss  Immunizations status: up to date  Lifestyle:  She consumes a diverse and healthy diet  She is on a low fat and low carbohydrate diet and is generally adherent  She has weight concerns  She exercises regularly  She exercises 7 times per week for 30 or more minutes per session  Exercise includes walking and swimming  She does not use tobacco  She denies alcohol use  She denies drug use  unable to loose weight  Reproductive health:  she reports normal menses  surgical menopause  Screening: cancer screening reviewed and current  Cervical cancer screening includes a pap smear performed last year  Breast cancer screening includes a mammogram performed last year  She hasn't been previously screened for colorectal cancer  metabolic screening reviewed and current  Metabolic screening includes uncertain timing of her last lipid profile, uncertain timing of her last glucose screening and uncertain timing of her last thyroid function test    risk screening reviewed and current  Cardiovascular risk factors: obesity, but no stress, no tobacco use and no sedentary lifestyle  General health risks: previous breast cancer, abnormal cervical cytology and s/p chemo, radiation  Safety elements used: seat belt and safe driving habits  Risk assessments performed include depression symptoms  Risk findings: no depression symptoms  Review of Systems    Constitutional: No fever, no chills, feels well, no tiredness, no recent weight gain or weight loss  Eyes: No complaints of eye pain, no red eyes, no eyesight problems, no discharge, no dry eyes, no itching of eyes  ENT: no complaints of earache, no loss of hearing, no nose bleeds, no nasal discharge, no sore throat, no hoarseness     Cardiovascular: No complaints of slow heart rate, no fast heart rate, no chest pain, no palpitations, no leg claudication, no lower extremity edema  Respiratory: No complaints of shortness of breath, no wheezing, no cough, no SOB on exertion, no orthopnea, no PND  Gastrointestinal: No complaints of abdominal pain, no constipation, no nausea or vomiting, no diarrhea, no bloody stools  Genitourinary: No complaints of dysuria, no incontinence, no pelvic pain, no dysmenorrhea, no vaginal discharge or bleeding  Musculoskeletal: No complaints of arthralgias, no myalgias, no joint swelling or stiffness, no limb pain or swelling  Integumentary: No complaints of skin rash or lesions, no itching, no skin wounds, no breast pain or lump  Neurological: No complaints of headache, no confusion, no convulsions, no numbness, no dizziness or fainting, no tingling, no limb weakness, no difficulty walking  Psychiatric: Not suicidal, no sleep disturbance, no anxiety or depression, no change in personality, no emotional problems  Endocrine: No complaints of proptosis, no hot flashes, no muscle weakness, no deepening of the voice, no feelings of weakness  Hematologic/Lymphatic: No complaints of swollen glands, no swollen glands in the neck, does not bleed easily, does not bruise easily  Active Problems    1  History of Axillary Lymphadenectomy   2  Cancer of overlapping sites of right breast (174 8) (C50 811)   3  Carcinoma of breast upper outer quadrant, right (174 4) (C50 411)   4  Cervical dysplasia (622 10) (N87 9)   5  Leiomyoma of uterus (218 9) (D25 9)   6   Visit for screening mammogram (V76 12) (Z12 31)    Past Medical History    · History of Age At First Period 15 Years Old (Menarche)   · History of Bacterial vaginosis (616 10,041 9) (N76 0,B96 89)   · History of Encounter for gynecological examination (V72 31) (Z01 419)   · History of Encounter for screening mammogram for malignant neoplasm of breast  (V76 12) (Z12 31)   · History of backache (V13 59) (Z87 39)   · History of malignant neoplasm of female breast (V10 3) (Z85 3)   · History of radiation therapy (V15 3) (Z92 3)   · History of senile atrophic vaginitis (V13 29) (Z87 42)   · History of vaginal discharge (V13 29) (Z87 42)   · History of vaginitis (V13 29) (Z87 42)   · History of vaginitis (V13 29) (Z87 42)   · 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)   · 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)   · History of Missed  (632) (O02 1)   · History of Radiation Therapy (V15 3)   · History of Stress incontinence, female (625 6) (N39 3)   · History of Summary Of Previous Pregnancies  1  (Total No )   · History of Summary Of Previous Pregnancies Para 0  (Deliveries)   · History of Vaginal candidiasis (112 1) (B37 3)    Surgical History    · History of Axillary Lymphadenectomy   · History of Chemotherapeutics (V87 41)   · History of Hysterectomy   · History of Laparoscopy With Total Hysterectomy   · History of Right Breast Lumpectomy    Family History  Mother    · Family history of Coronary Artery Disease (V17 49)   · Family history of Polyps Of The Sigmoid Colon  Father    · Family history of Polyps Of The Sigmoid Colon  Maternal Grandmother    · Family history of Skin Cancer (V16 8)  Maternal Grandfather    · Family history of Colon Cancer (V16 0)   · Family history of Polyps Of The Sigmoid Colon  Paternal Grandfather    · Family history of Coronary Artery Disease (V17 49)   · Family history of Diabetes Mellitus (V18 0)   · Family history of Polyps Of The Sigmoid Colon    Social History    · Being A Social Drinker   · Marital History - Single   · Never A Smoker    Current Meds   1  No Reported Medications Recorded    Allergies    1  Augmentin TABS   2  Shellfish-derived Products   3  Tetracyclines    4   Adhesive Tape    Vitals   Recorded: 98EGK8368 04:57PM   Temperature 98 1 F, Tympanic   Heart Rate 78, L Radial   Pulse Quality Normal, L Radial   Respiration Quality Normal Respiration 16   Systolic 852, LUE, Sitting   Diastolic 72, LUE, Sitting   Height 4 ft 11 5 in   Weight 146 lb    BMI Calculated 29   BSA Calculated 1 62     Physical Exam    Constitutional   General appearance: No acute distress, well appearing and well nourished  Head and Face   Head and face: Normal     Eyes   Conjunctiva and lids: No swelling, erythema or discharge  Pupils and irises: Equal, round, reactive to light  Ears, Nose, Mouth, and Throat   Otoscopic examination: Tympanic membranes translucent with normal light reflex  Canals patent without erythema  Nasal mucosa, septum, and turbinates: Normal without edema or erythema  Lips, teeth, and gums: Normal, good dentition  Oropharynx: Normal with no erythema, edema, exudate or lesions  Neck   Thyroid: Abnormal   The thyroid was diffusely enlarged and was soft, but did not have a bruit  There were no thyroid nodules  Pulmonary   Respiratory effort: No increased work of breathing or signs of respiratory distress  Auscultation of lungs: Clear to auscultation  Cardiovascular   Auscultation of heart: Normal rate and rhythm, normal S1 and S2, no murmurs  Pedal pulses: 2+ bilaterally  Examination of extremities for edema and/or varicosities: Normal     Abdomen   Abdomen: Non-tender, no masses  Musculoskeletal   Gait and station: Normal     Range of motion: Normal     Stability: Normal     Muscle strength/tone: Normal     Skin   Skin and subcutaneous tissue: Normal without rashes or lesions  Psychiatric   Judgment and insight: Normal     Mood and affect: Normal        Results/Data  PHQ-2 Adult Depression Screening 27Jun2017 05:34PM Elaine Hayden     Test Name Result Flag Reference   PHQ-2 Adult Depression Score 0     Over the last two weeks, how often have you been bothered by any of the following problems?   Little interest or pleasure in doing things: Not at all - 0  Feeling down, depressed, or hopeless: Not at all - 0 PHQ-2 Adult Depression Screening Negative         Future Appointments    Date/Time Provider Specialty Site   08/23/2017 08:00 AM LISA Shelton   Hematology Oncology [facility] [place]   09/06/2017 08:00 AM Anastacia Bradley MD Surgical Oncology [facility]     Signatures   Electronically signed by : VLADIMIR Colbert; Jun 27 2017  5:39PM EST                       (Author)    Electronically signed by : LISA Rodriguez ; Jun 27 2017  7:52PM EST                       (Author)

## 2018-01-22 VITALS
WEIGHT: 146 LBS | DIASTOLIC BLOOD PRESSURE: 72 MMHG | BODY MASS INDEX: 28.66 KG/M2 | RESPIRATION RATE: 16 BRPM | HEIGHT: 60 IN | SYSTOLIC BLOOD PRESSURE: 120 MMHG | HEART RATE: 78 BPM | TEMPERATURE: 98.1 F

## 2018-01-22 VITALS
RESPIRATION RATE: 16 BRPM | HEIGHT: 59 IN | BODY MASS INDEX: 28.43 KG/M2 | HEART RATE: 70 BPM | DIASTOLIC BLOOD PRESSURE: 80 MMHG | TEMPERATURE: 98 F | WEIGHT: 141 LBS | SYSTOLIC BLOOD PRESSURE: 122 MMHG

## 2018-01-22 VITALS
BODY MASS INDEX: 28.83 KG/M2 | TEMPERATURE: 97.7 F | SYSTOLIC BLOOD PRESSURE: 124 MMHG | HEART RATE: 86 BPM | DIASTOLIC BLOOD PRESSURE: 82 MMHG | RESPIRATION RATE: 16 BRPM | WEIGHT: 143 LBS | HEIGHT: 59 IN | OXYGEN SATURATION: 98 %

## 2018-09-05 ENCOUNTER — HOSPITAL ENCOUNTER (OUTPATIENT)
Dept: MAMMOGRAPHY | Facility: HOSPITAL | Age: 48
Discharge: HOME | End: 2018-09-05
Payer: COMMERCIAL

## 2018-09-05 DIAGNOSIS — Z12.31 ENCOUNTER FOR SCREENING MAMMOGRAM FOR MALIGNANT NEOPLASM OF BREAST: ICD-10-CM

## 2018-09-05 PROCEDURE — 77067 SCR MAMMO BI INCL CAD: CPT

## 2018-09-07 ENCOUNTER — OFFICE VISIT (OUTPATIENT)
Dept: SURGICAL ONCOLOGY | Facility: CLINIC | Age: 48
End: 2018-09-07
Payer: COMMERCIAL

## 2018-09-07 VITALS
SYSTOLIC BLOOD PRESSURE: 120 MMHG | TEMPERATURE: 97.9 F | BODY MASS INDEX: 23.39 KG/M2 | RESPIRATION RATE: 16 BRPM | WEIGHT: 116 LBS | DIASTOLIC BLOOD PRESSURE: 80 MMHG | HEART RATE: 56 BPM | HEIGHT: 59 IN

## 2018-09-07 DIAGNOSIS — Z12.31 VISIT FOR SCREENING MAMMOGRAM: ICD-10-CM

## 2018-09-07 DIAGNOSIS — C50.811 CANCER OF OVERLAPPING SITES OF RIGHT BREAST (HCC): Primary | ICD-10-CM

## 2018-09-07 PROCEDURE — 99213 OFFICE O/P EST LOW 20 MIN: CPT | Performed by: NURSE PRACTITIONER

## 2018-09-07 RX ORDER — MULTIVITAMIN
1 TABLET ORAL DAILY
COMMUNITY

## 2018-09-07 NOTE — PROGRESS NOTES
Surgical Oncology Follow Up       [address] [facility] SURGICAL ONCOLOGY [place]  Cindy Blackwell  1970  [id number]  [facility] SURGICAL ONCOLOGY [place]  [address] [place] [address]    Chief Complaint   Patient presents with    Follow-up     Patient is here for a 1 year breast cancer follow up  Assessment/Plan:  1  Cancer of overlapping sites of right breast ([address] )  -1 year f/u visit    2  Visit for screening mammogram  - Mammo screening bilateral w 3d & cad; Future      Discussion/Summary: Patient is a 35-year-old female who presents today for a one-year follow-up for right breast cancer diagnosed in July 2011  Pathology revealed invasive ductal carcinoma, ER negative, MD negative, HER-2 positive  She underwent neoadjuvant chemotherapy (AC x 4, Paclitaxel, Herceptin) with a complete pathological response  She then underwent a right lumpectomy and right axillary dissection by Dr Kim Baez and completed radiation therapy  She tested negative for the BRCA mutation  She had a bilateral mammogram performed on September 5, 2018 which was BI-RADS 1  She has no new complaints today- denies headaches, back pain, bone pain, cough, SOB, abdominal pain  She reports "muscular knots" in the right scapular region which are relieved by massage/chiropractor adjustments- denies persistent pain  No worrisome exam findings  We will order a bilateral mammogram for September 2019 and we will see the patient back in 1 year or sooner if the need arises  She was instructed to call with any new concerns or symptoms  All of her questions were answered          History of Present Illness:     Oncology History    Diagnosis and Staging: July 2011: Locally advanced right breast invasive ductal carcinoma, Stage III, complete pathologic response 2012negative, MD negative, HER-2 positive, Mammaprint high-risk      Treatment History: 2011: Neoadjuvant dose dense Adriamycin and Cytoxan followed by paclitaxel followed by Herceptin  2011: Right lumpectomy with axillary dissection      Current Therapy: Observation         Cancer of overlapping sites of right breast ([facility] )        -Interval History:   Patient presents today for 1 year follow-up visit for right breast cancer diagnosed in 2011  She had a bilateral mammogram performed on September 5, 2018 which was BI-RADS 1  She has no new complaints today  Review of Systems:  Review of Systems   Constitutional: Negative for activity change, appetite change, chills, fatigue, fever and unexpected weight change  HENT: Negative for trouble swallowing  Eyes: Negative for pain, redness and visual disturbance  Respiratory: Negative for cough, shortness of breath and wheezing  Cardiovascular: Negative for chest pain, palpitations and leg swelling  Gastrointestinal: Negative for abdominal pain, constipation, diarrhea, nausea and vomiting  Endocrine: Negative for cold intolerance and heat intolerance  Musculoskeletal: Positive for myalgias (muscular "knots" reported right scapular region)  Negative for arthralgias, back pain and gait problem  Skin: Negative for color change and rash  Neurological: Negative for dizziness, syncope, light-headedness, numbness and headaches  Hematological: Negative for adenopathy  Psychiatric/Behavioral: Negative for agitation and confusion  All other systems reviewed and are negative        Patient Active Problem List   Diagnosis    Cancer of overlapping sites of right breast ([facility] [address] [facility] )    Cervical dysplasia    Leiomyoma of uterus    Lichen sclerosus et atrophicus     Past Medical History:   Diagnosis Date    Atrophic vaginitis     Bacterial vaginitis     History of radiation therapy      Past Surgical History:   Procedure Laterality Date    AXILLARY NODE DISSECTION      BREAST LUMPECTOMY Right     LAPAROSCOPIC TOTAL HYSTERECTOMY       Family History Problem Relation Age of Onset    Coronary artery disease Mother     Skin cancer Maternal Grandmother     Colon cancer Maternal Grandfather     Coronary artery disease Paternal Grandfather      Social History     Social History    Marital status: Single     Spouse name: N/A    Number of children: N/A    Years of education: N/A     Occupational History    Not on file  Social History Main Topics    Smoking status: Not on file    Smokeless tobacco: Not on file    Alcohol use Not on file    Drug use: Unknown    Sexual activity: Not on file     Other Topics Concern    Not on file     Social History Narrative    No narrative on file       Current Outpatient Prescriptions:     Calcium Carbonate-Vit D-Min (CALCIUM 1200 PO), Take by mouth, Disp: , Rfl:     cholecalciferol (VITAMIN D3) 1,000 units tablet, Take 1,000 Units by mouth daily, Disp: , Rfl:     Multiple Vitamin (MULTIVITAMIN) tablet, Take 1 tablet by mouth daily, Disp: , Rfl:   Allergies   Allergen Reactions    Augmentin [Amoxicillin-Pot Clavulanate] GI Intolerance    Tetracycline GI Intolerance     Vitals:    09/07/18 0811   BP: 120/80   Pulse: 56   Resp: 16   Temp: 97 9 °F (36 6 °C)       Physical Exam   Constitutional: She is oriented to person, place, and time  Vital signs are normal  She appears well-developed and well-nourished  No distress  HENT:   Head: Normocephalic and atraumatic  Neck: Normal range of motion  Cardiovascular: Normal rate, regular rhythm and normal heart sounds  Pulmonary/Chest: Effort normal and breath sounds normal    Bilateral breasts were examined in the sitting and supine position  Right breast surgical scar with stable scar tissue noted  There are no masses, skin nodules, nipple changes or nipple discharge  There is no bilateral supraclavicular or axillary lymphadenopathy noted  Abdominal: Soft  Normal appearance  She exhibits no mass  There is no hepatosplenomegaly  There is no tenderness  Musculoskeletal: Normal range of motion  Lymphadenopathy:     She has no axillary adenopathy  Right: No supraclavicular adenopathy present  Left: No supraclavicular adenopathy present  Neurological: She is alert and oriented to person, place, and time  Skin: Skin is warm, dry and intact  No rash noted  She is not diaphoretic  Psychiatric: She has a normal mood and affect  Her speech is normal    Vitals reviewed  Results:    Imaging  Mammo Screening Bilateral W Cad    Result Date: 9/5/2018  Narrative: Patient History: Patient has history of breast cancer at age 39 and is nulliparous  Family history of colorectal cancer in paternal grandfather  Radiation therapy of the right breast, May 2012  Malignant lumpectomy of the right breast, February 7, 2012  Pathology Report of both breasts, February 7, 2012  Malignant breast guidance of the right breast, July 22, 2011  Malignant WBUS lymph node bx ndl of both breasts, July 22, 2011  Pathology Report of both breasts, July 22, 2011  Took hormonal contraceptives for 2 months  Patient has never smoked  Patient's BMI is 26 8  Reason for exam: screening, asymptomatic  Mammo Screening Bilateral W CAD: September 5, 2018 - Check In #: [de-identified] Bilateral CC and MLO view(s) were taken  Technologist: DAPHNIE Potter (DAPHNIE)(M) Prior study comparison: September 1, 2017, mammo screening bilateral W CAD performed at [facility]  August 29, 2016, mammo diagnostic bilateral W CAD, performed at [address] [place] [address]  August 28, 2015, bilateral WB digitl bilat kris, performed at [address] [place] [address]  August 21, 2014, bilateral WB digitl bilat kris, performed at [address] [place] [address]  August 9, 2013, bilateral WB digitl bilat kris, performed at [address] [place] [address]  There are scattered fibroglandular densities   No dominant soft tissue mass, architectural distortion or suspicious calcifications are noted in either breast   The skin and nipple contours are within normal limits  Stable postoperative changes noted  IMPRESSION:  No mammographic evidence of malignancy  No significant changes when compared with prior studies  ACR BI-RADS® Assessments: BiRad:1 - Negative Recommendation: Routine screening mammogram of both breasts in 1 year  Analyzed by CAD The patient is scheduled in a reminder system for screening mammography  8-10% of cancers will be missed on mammography  Management of a palpable abnormality must be based on clinical grounds  Patients will be notified of their results via letter from our facility  Accredited by [employer] of [employer] and FDA  Transcription Location: [address] [place] [address]: [id number] Risk Value(s): Myriad Table: 4 7%, MRS : Based on personal and/or family history, consideration of hereditary risk assessment may be warranted  I reviewed the above imaging data  Advance Care Planning/Advance Directives:  Discussed disease status, cancer treatment plans and/or cancer treatment goals with the patient

## 2018-09-20 ENCOUNTER — ANNUAL EXAM (OUTPATIENT)
Dept: GYNECOLOGY | Facility: CLINIC | Age: 48
End: 2018-09-20
Payer: COMMERCIAL

## 2018-09-20 VITALS
HEIGHT: 59 IN | BODY MASS INDEX: 24.27 KG/M2 | WEIGHT: 120.4 LBS | DIASTOLIC BLOOD PRESSURE: 70 MMHG | SYSTOLIC BLOOD PRESSURE: 110 MMHG

## 2018-09-20 DIAGNOSIS — Z01.419 ENCOUNTER FOR GYNECOLOGICAL EXAMINATION WITHOUT ABNORMAL FINDING: ICD-10-CM

## 2018-09-20 DIAGNOSIS — Z85.3 HISTORY OF RIGHT BREAST CANCER: ICD-10-CM

## 2018-09-20 DIAGNOSIS — Z13.820 SCREENING FOR OSTEOPOROSIS: ICD-10-CM

## 2018-09-20 DIAGNOSIS — N95.2 ATROPHIC VAGINITIS: Primary | ICD-10-CM

## 2018-09-20 PROCEDURE — 76977 US BONE DENSITY MEASURE: CPT | Performed by: OBSTETRICS & GYNECOLOGY

## 2018-09-20 PROCEDURE — S0612 ANNUAL GYNECOLOGICAL EXAMINA: HCPCS | Performed by: OBSTETRICS & GYNECOLOGY

## 2018-09-20 NOTE — PROGRESS NOTES
Assessment/Plan:       Diagnoses and all orders for this visit:    Atrophic vaginitis-discussed treatment options  Considering vaginal laser treatment    Encounter for gynecological examination without abnormal finding    History of right breast cancer    Screening for osteoporosis: heel scan today: + 1 4        Subjective:      Patient ID: Galdino Wisdom is a 50 y o  female  HPI  Annual exam  Hx ST III Rt breast ca, ER/MD -, HER2 +  C/o vaginal dryness/pc burning  No bleeding, vaginal discharge or urinary complaints  Prior TLH    The following portions of the patient's history were reviewed and updated as appropriate: allergies, current medications, past family history, past medical history, past social history, past surgical history and problem list     Review of Systems   Constitutional: Negative  Gastrointestinal: Negative  Genitourinary: Negative  Objective:      /70 (BP Location: Left arm)   Ht 4' 11" (1 499 m)   Wt 54 6 kg (120 lb 6 4 oz)   BMI 24 32 kg/m²          Physical Exam   Constitutional: She appears well-developed and well-nourished  Neck: Normal range of motion  Neck supple  No thyromegaly present  Cardiovascular: Normal rate, regular rhythm and normal heart sounds  Pulmonary/Chest: Effort normal and breath sounds normal  No respiratory distress  Right breast exhibits no inverted nipple, no mass, no nipple discharge, no skin change and no tenderness  Left breast exhibits no inverted nipple, no mass, no nipple discharge, no skin change and no tenderness  S/p Rt lum pectomy   Abdominal: Soft  Bowel sounds are normal  She exhibits no distension and no mass  There is no tenderness  Hernia confirmed negative in the right inguinal area and confirmed negative in the left inguinal area  Genitourinary: There is no rash or lesion on the right labia  There is no rash or lesion on the left labia  Right adnexum displays no mass, no tenderness and no fullness   Left adnexum displays no mass, no tenderness and no fullness  There is erythema in the vagina  No bleeding in the vagina  No vaginal discharge found  Genitourinary Comments: Atrophic changes   Lymphadenopathy:        Right: No inguinal adenopathy present  Left: No inguinal adenopathy present

## 2018-10-03 ENCOUNTER — OFFICE VISIT (OUTPATIENT)
Dept: GYNECOLOGY | Facility: CLINIC | Age: 48
End: 2018-10-03
Payer: COMMERCIAL

## 2018-10-03 VITALS
HEIGHT: 59 IN | SYSTOLIC BLOOD PRESSURE: 102 MMHG | DIASTOLIC BLOOD PRESSURE: 60 MMHG | WEIGHT: 118.6 LBS | BODY MASS INDEX: 23.91 KG/M2

## 2018-10-03 DIAGNOSIS — B37.9 CANDIDIASIS: Primary | ICD-10-CM

## 2018-10-03 DIAGNOSIS — B96.89 BV (BACTERIAL VAGINOSIS): ICD-10-CM

## 2018-10-03 DIAGNOSIS — N76.0 BV (BACTERIAL VAGINOSIS): ICD-10-CM

## 2018-10-03 PROCEDURE — 99213 OFFICE O/P EST LOW 20 MIN: CPT | Performed by: OBSTETRICS & GYNECOLOGY

## 2018-10-03 PROCEDURE — 87210 SMEAR WET MOUNT SALINE/INK: CPT | Performed by: OBSTETRICS & GYNECOLOGY

## 2018-10-03 RX ORDER — FLUCONAZOLE 150 MG/1
TABLET ORAL
Qty: 2 TABLET | Refills: 0 | Status: SHIPPED | OUTPATIENT
Start: 2018-10-03 | End: 2018-10-05

## 2018-10-03 NOTE — PROGRESS NOTES
Or patient presents to the office complaining of white thick vaginal discharge over the past few days  She also is complaining of pruritus  No fever no abdominal pain no urinary complaints  Patient has a history of stage III breast cancer  She is status post total laparoscopic hysterectomy  Physical exam:  Abdomen soft nontender    Pelvic exam:  Uterus and cervix are absent no adnexal masses  There is a thick white vaginal discharge present  Sugar swab culture was obtained along with wet mount    Wet mount reveals a hyphae consistent with candidiasis    Plan Diflucan 1 tablet now repeat 48 hours

## 2018-10-06 LAB
C GLABRATA DNA VAG QL NAA+PROBE: NOT DETECTED
CANDIDA DNA VAG QL NAA+PROBE: DETECTED
SL AMB C. PARAPSILOSIS, DNA: NOT DETECTED
SL AMB C. TROPICALIS, DNA: NOT DETECTED

## 2018-10-29 DIAGNOSIS — B37.9 CANDIDIASIS: Primary | ICD-10-CM

## 2018-10-29 RX ORDER — FLUCONAZOLE 150 MG/1
150 TABLET ORAL
Qty: 2 TABLET | Refills: 2 | Status: SHIPPED | OUTPATIENT
Start: 2018-10-29 | End: 2018-11-02

## 2018-12-11 ENCOUNTER — OFFICE VISIT (OUTPATIENT)
Dept: URGENT CARE | Facility: CLINIC | Age: 48
End: 2018-12-11
Payer: COMMERCIAL

## 2018-12-11 VITALS
BODY MASS INDEX: 23.56 KG/M2 | HEART RATE: 72 BPM | HEIGHT: 60 IN | WEIGHT: 120 LBS | OXYGEN SATURATION: 97 % | DIASTOLIC BLOOD PRESSURE: 67 MMHG | SYSTOLIC BLOOD PRESSURE: 114 MMHG | TEMPERATURE: 98.4 F | RESPIRATION RATE: 18 BRPM

## 2018-12-11 DIAGNOSIS — H66.001 ACUTE SUPPURATIVE OTITIS MEDIA OF RIGHT EAR WITHOUT SPONTANEOUS RUPTURE OF TYMPANIC MEMBRANE, RECURRENCE NOT SPECIFIED: Primary | ICD-10-CM

## 2018-12-11 PROCEDURE — G0382 LEV 3 HOSP TYPE B ED VISIT: HCPCS | Performed by: PHYSICIAN ASSISTANT

## 2018-12-11 RX ORDER — AMOXICILLIN 500 MG/1
500 CAPSULE ORAL EVERY 12 HOURS SCHEDULED
Qty: 20 CAPSULE | Refills: 0 | Status: SHIPPED | OUTPATIENT
Start: 2018-12-11 | End: 2018-12-21

## 2018-12-11 NOTE — PROGRESS NOTES
[facility] [facility]        NAME: Onofre Valenzuela is a 50 y o  female  : 1970    MRN: [id number]  DATE: 2018  TIME: 12:21 PM    Assessment and Plan   Acute suppurative otitis media of right ear without spontaneous rupture of tympanic membrane, recurrence not specified [H66 001]  1  Acute suppurative otitis media of right ear without spontaneous rupture of tympanic membrane, recurrence not specified  amoxicillin (AMOXIL) 500 mg capsule         Patient Instructions     Patient Instructions   Take antibiotic as directed  Use nasal spray  Proceed to  ER if symptoms worsen  Chief Complaint     Chief Complaint   Patient presents with    Nasal Congestion     yesterday    Earache     this morning, right ear pain and feels clogged         History of Present Illness       Pt presents with right ear pain that woke her up at 3am this morning  She reports sinus congestion for a couple days but no fever  She states it feels like an ear infection but she hasn't had one since she was a child  She took Mucinex early this morning and felt like it helped with the clogged feeling in her ear  Review of Systems   Review of Systems   Constitutional: Negative  HENT: Positive for congestion and ear pain  Negative for ear discharge  Eyes: Negative  Respiratory: Negative for cough  Skin: Negative  Neurological: Negative for dizziness, light-headedness and headaches           Current Medications       Current Outpatient Prescriptions:     amoxicillin (AMOXIL) 500 mg capsule, Take 1 capsule (500 mg total) by mouth every 12 (twelve) hours for 10 days, Disp: 20 capsule, Rfl: 0    Calcium Carbonate-Vit D-Min (CALCIUM 1200 PO), Take by mouth, Disp: , Rfl:     cholecalciferol (VITAMIN D3) 1,000 units tablet, Take 1,000 Units by mouth daily, Disp: , Rfl:     Multiple Vitamin (MULTIVITAMIN) tablet, Take 1 tablet by mouth daily, Disp: , Rfl:     Current Allergies     Allergies as of 12/11/2018 - Reviewed 10/03/2018   Allergen Reaction Noted    Tetracyclines & related GI Intolerance 08/20/2015    Augmentin [amoxicillin-pot clavulanate] GI Intolerance and Other (See Comments) 08/20/2015    Tetracycline GI Intolerance 09/07/2018            The following portions of the patient's history were reviewed and updated as appropriate: allergies, current medications, past family history, past medical history, past social history, past surgical history and problem list      Past Medical History:   Diagnosis Date    Atrophic vaginitis     Bacterial vaginitis     History of radiation therapy        Past Surgical History:   Procedure Laterality Date    AXILLARY NODE DISSECTION      BREAST LUMPECTOMY Right     LAPAROSCOPIC TOTAL HYSTERECTOMY         Family History   Problem Relation Age of Onset    Coronary artery disease Mother     Skin cancer Maternal Grandmother     Colon cancer Maternal Grandfather     Coronary artery disease Paternal Grandfather          Medications have been verified  Objective   /67   Pulse 72   Temp 98 4 °F (36 9 °C) (Tympanic)   Resp 18   Ht 5' (1 524 m)   Wt 54 4 kg (120 lb)   SpO2 97%   BMI 23 44 kg/m²        Physical Exam     Physical Exam   Constitutional: She is oriented to person, place, and time  She appears well-developed  No distress  HENT:   Right Ear: External ear and ear canal normal  Tympanic membrane is erythematous and bulging  Tympanic membrane is not perforated  Left Ear: Tympanic membrane, external ear and ear canal normal    Nose: Mucosal edema present  Right sinus exhibits no maxillary sinus tenderness and no frontal sinus tenderness  Left sinus exhibits no maxillary sinus tenderness and no frontal sinus tenderness  Mouth/Throat: Oropharynx is clear and moist and mucous membranes are normal    Cardiovascular: Normal rate and regular rhythm      Pulmonary/Chest: Effort normal and breath sounds normal    Neurological: She is alert and oriented to person, place, and time  Skin: Skin is warm and dry  No rash noted  Nursing note and vitals reviewed

## 2018-12-19 ENCOUNTER — OFFICE VISIT (OUTPATIENT)
Dept: URGENT CARE | Facility: CLINIC | Age: 48
End: 2018-12-19
Payer: COMMERCIAL

## 2018-12-19 VITALS
OXYGEN SATURATION: 99 % | DIASTOLIC BLOOD PRESSURE: 68 MMHG | SYSTOLIC BLOOD PRESSURE: 132 MMHG | WEIGHT: 119 LBS | HEART RATE: 75 BPM | HEIGHT: 60 IN | BODY MASS INDEX: 23.36 KG/M2 | TEMPERATURE: 98.3 F

## 2018-12-19 DIAGNOSIS — J06.9 ACUTE URI: Primary | ICD-10-CM

## 2018-12-19 DIAGNOSIS — H65.91 OTITIS MEDIA WITH EFFUSION, RIGHT: ICD-10-CM

## 2018-12-19 PROCEDURE — 99213 OFFICE O/P EST LOW 20 MIN: CPT | Performed by: NURSE PRACTITIONER

## 2018-12-19 RX ORDER — FLUTICASONE PROPIONATE 50 MCG
1 SPRAY, SUSPENSION (ML) NASAL 2 TIMES DAILY
Qty: 16 G | Refills: 0 | Status: SHIPPED | OUTPATIENT
Start: 2018-12-19 | End: 2020-04-16

## 2018-12-19 NOTE — PATIENT INSTRUCTIONS
-Finish the antibiotic as prescribed last week  -Use Neti Pot 2 times per day x 5 days, prior to using the prescribed Flonase   -Use Flonase as prescribed 2 times per day x 5 days after using Neti Pot   -If using several medications be sure to read all of the ingredients so you are not taking too many of the same medications   -Take Tylenol/Ibuprofen as needed  -Stay hydrated, drink lots of fluids, soups, and tea with honey   -Warm salt water gargles may help with sore throat  -Use cool or warm humidifier   -Your symptoms may last up to 14 days, continue supportive therapy as needed   -Follow up with your PCP if your symptoms become worse or do not improve([facility]athy Montero [facility] information provided)  -If you have difficulty breathing, can not catch your breath or feel short of breath go directly to the emergency room

## 2018-12-19 NOTE — PROGRESS NOTES
[facility]        NAME: Aysha Berkowitz is a 50 y o  female  : 1970    MRN: [id number]  DATE: 2018  TIME: 5:04 PM    Assessment and Plan   Acute URI [J06 9]  1  Acute URI  fluticasone (FLONASE) 50 mcg/act nasal spray   2  Otitis media with effusion, right           Patient Instructions     -Finish the antibiotic as prescribed last week  -Use Neti Pot 2 times per day x 5 days, prior to using the prescribed Flonase   -Use Flonase as prescribed 2 times per day x 5 days after using Neti Pot   -If using several medications be sure to read all of the ingredients so you are not taking too many of the same medications   -Take Tylenol/Ibuprofen as needed  -Stay hydrated, drink lots of fluids, soups, and tea with honey   -Warm salt water gargles may help with sore throat  -Use cool or warm humidifier   -Your symptoms may last up to 14 days, continue supportive therapy as needed   -Follow up with your PCP if your symptoms become worse or do not improve([facility]athy Montero [facility] information provided)  -If you have difficulty breathing, can not catch your breath or feel short of breath go directly to the emergency room  I explained to the patient that often times there can be an underlying virus that is causing symptoms, in addition to the more obvious bacterial infection she had in her ear  Additionally I explained that many times, due to the nasal swelling fluid can get trapped in the middle ear causing the clogged feeling  Supportive measures, such as the one's recommended in her plan, are the best way to resolve the lingering symptoms  She verbalized understanding of this information  Follow up with PCP in 3-5 days  Proceed to  ER if symptoms worsen  Chief Complaint     Chief Complaint   Patient presents with    Sinusitis     seen at a [facility] on Tuesday  given antibiotic, mucinex, flonase  ear still clogged, head congestion, tastes like an "infection"  feels post nasal drip  has to fly on Sunday  History of Present Illness       Pt was seen last Tuesday in a [facility] and diagnosed with acute otitis media and prescribed antibiotics at that time  She presents today because she still has some complaints of right ear feeling clogged and cracking, congestion, clearing her throat, minor sore throat and intermittent cough  She feels better, but states she thought she should feel even better since she is on the antibiotic  Her concern is that she needs to fly this weekend  She denies any ear pain, wheeze, SOB, CP, fever  She has tried Musinex  She has not used a Neti pot or flonase daily, denies any OTC Tylenol/ibuprofen  Review of Systems   Review of Systems   Constitutional: Positive for fatigue  HENT: Positive for congestion, postnasal drip and sore throat  Negative for sinus pain, sinus pressure and sneezing  Ear fullness   Respiratory: Positive for cough  Negative for chest tightness and shortness of breath  Cardiovascular: Negative for chest pain  Gastrointestinal: Negative for diarrhea, nausea and vomiting           Current Medications       Current Outpatient Prescriptions:     amoxicillin (AMOXIL) 500 mg capsule, Take 1 capsule (500 mg total) by mouth every 12 (twelve) hours for 10 days, Disp: 20 capsule, Rfl: 0    Calcium Carbonate-Vit D-Min (CALCIUM 1200 PO), Take by mouth, Disp: , Rfl:     cholecalciferol (VITAMIN D3) 1,000 units tablet, Take 1,000 Units by mouth daily, Disp: , Rfl:     Multiple Vitamin (MULTIVITAMIN) tablet, Take 1 tablet by mouth daily, Disp: , Rfl:     fluticasone (FLONASE) 50 mcg/act nasal spray, 1 spray into each nostril 2 (two) times a day for 5 days, Disp: 16 g, Rfl: 0    Current Allergies     Allergies as of 12/19/2018 - Reviewed 12/19/2018   Allergen Reaction Noted    Tetracyclines & related GI Intolerance 08/20/2015    Augmentin [amoxicillin-pot clavulanate] GI Intolerance and Other (See Comments) 2015    Tetracycline GI Intolerance 2018            The following portions of the patient's history were reviewed and updated as appropriate: allergies, current medications, past family history, past medical history, past social history, past surgical history and problem list      Past Medical History:   Diagnosis Date    Atrophic vaginitis     Bacterial vaginitis     Carcinoma of right breast in female, estrogen receptor negative ([facility] [address] )     Upper outer quadrant  Resolved 2017     History of radiation therapy     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)     Malignant neoplasm metastasis to lymph nodes     Missed      Resolved 2015        Past Surgical History:   Procedure Laterality Date    AXILLARY NODE DISSECTION      BREAST LUMPECTOMY Right     BREAST LUMPECTOMY Right 2012    LAPAROSCOPIC TOTAL HYSTERECTOMY      OTHER SURGICAL HISTORY      Chemotherapeutics        Family History   Problem Relation Age of Onset    Coronary artery disease Mother     Colon polyps Mother         Sigmoid colon    Skin cancer Maternal Grandmother     Colon cancer Maternal Grandfather     Colon polyps Maternal Grandfather         Sigmoid colon     Coronary artery disease Paternal Grandfather     Diabetes Paternal Grandfather     Colon polyps Paternal Grandfather         Sigmoid colon    Colon polyps Father         Sigmoid colon         Medications have been verified  Objective   /68   Pulse 75   Temp 98 3 °F (36 8 °C)   Ht 5' (1 524 m)   Wt 54 kg (119 lb)   SpO2 99%   BMI 23 24 kg/m²        Physical Exam     Physical Exam   Constitutional: She appears well-developed and well-nourished  HENT:   Right Ear: Tympanic membrane and external ear normal    Left Ear: Tympanic membrane, external ear and ear canal normal    Nose: Mucosal edema and rhinorrhea present  Mouth/Throat: No posterior oropharyngeal edema or posterior oropharyngeal erythema     Right ear canal with some redness, no edema  Bubbles are visualized in the TM, evident of effusion  There is no tenderness with palpation to posterior ear, tragus, or cervical nodes  Throat positive for post nasal drip  Cardiovascular: Normal rate and regular rhythm  Pulmonary/Chest: Breath sounds normal  No respiratory distress  Nursing note and vitals reviewed

## 2019-01-16 ENCOUNTER — OFFICE VISIT (OUTPATIENT)
Dept: SURGICAL ONCOLOGY | Facility: CLINIC | Age: 49
End: 2019-01-16
Payer: COMMERCIAL

## 2019-01-16 VITALS
WEIGHT: 121 LBS | TEMPERATURE: 97.8 F | HEIGHT: 60 IN | RESPIRATION RATE: 16 BRPM | BODY MASS INDEX: 23.75 KG/M2 | HEART RATE: 68 BPM | DIASTOLIC BLOOD PRESSURE: 80 MMHG | SYSTOLIC BLOOD PRESSURE: 118 MMHG

## 2019-01-16 DIAGNOSIS — C50.811 CANCER OF OVERLAPPING SITES OF RIGHT BREAST (HCC): ICD-10-CM

## 2019-01-16 DIAGNOSIS — S20.211A CONTUSION OF RIGHT CHEST WALL, INITIAL ENCOUNTER: Primary | ICD-10-CM

## 2019-01-16 PROCEDURE — 99212 OFFICE O/P EST SF 10 MIN: CPT | Performed by: NURSE PRACTITIONER

## 2019-01-16 NOTE — PROGRESS NOTES
Surgical Oncology Follow Up       [address] [place] [address],[facility]  [facility] [place] [address] [place] [address]  1970  [id number]  [address] [place] [address],[facility]  [facility]COL[facility] [place] [address] [place] [address]    Chief Complaint   Patient presents with    Breast Cancer     Pt is here for follow up        Assessment/Plan:  1  Cancer of overlapping sites of right breast ([address] )  - f/u as previously scheduled    2  Contusion of right chest wall, initial encounter  - Call if bruising does not resolve to baseline in 2 weeks- will consider imaging at that time    Discussion/Summary: Patient is a 51-year-old female who presents today for for an evaluation after a contusion of the right chest wall  She was diagnosed with right breast cancer diagnosed in July 2011  Pathology revealed invasive ductal carcinoma, ER negative, MN negative, HER-2 positive  She underwent neoadjuvant chemotherapy (AC x 4, Paclitaxel, Herceptin) with a complete pathological response  She then underwent a right lumpectomy and right axillary dissection by Dr Markus Edwards and completed radiation therapy  She tested negative for the BRCA mutation  She had a bilateral mammogram performed on September 5, 2018 which was BI-RADS 1  She states that about 9 days ago, she was lifting a stack of boxes and a large box corner slid in the corner hit her in the right upper chest wall  She states she initially had pain however this quickly dissipated  About 4 days later, she noticed the area was swollen and bruised  Since that time, she states the bruising has dissipated and the swelling has dramatically gone down but she feels that there is still mild swelling present  She presents today for further evaluation since she does have a history of right breast cancer  On today's exam, I can only appreciate a mild bruise at the level of the first right anterior rib    I do not appreciate any subclavicular or supraclavicular or axillary lymphadenopathy  No other worrisome findings  Since her symptoms are improving, I have recommended that the patient continue to monitor for another 2 weeks  If she does not feel this area has returned to baseline, I have instructed her to contact our office and imaging can be performed at that time if necessary  She is in agreement with this plan  We will plan to see the patient back at her regularly scheduled appointment, assuming she has no further concerns at the area of the contusion  All of her questions were answered  History of Present Illness:     Oncology History    Diagnosis and Staging: July 2011: Locally advanced right breast invasive ductal carcinoma, Stage III, complete pathologic response 2012negative, MD negative, HER-2 positive, Mammaprint high-risk      Treatment History: 2011: Neoadjuvant dose dense Adriamycin and Cytoxan followed by paclitaxel followed by Herceptin  2011: Right lumpectomy with axillary dissection      Current Therapy: Observation         Cancer of overlapping sites of right breast ([address] )        -Interval History:  Patient presents today for an evaluation of a right upper chest wall contusion  Review of Systems:  Review of Systems   Constitutional: Negative for activity change, appetite change, chills, fatigue, fever and unexpected weight change  HENT: Negative for trouble swallowing  Eyes: Negative for pain, redness and visual disturbance  Respiratory: Negative for cough, shortness of breath and wheezing  Cardiovascular: Negative for chest pain, palpitations and leg swelling  Gastrointestinal: Negative for abdominal pain, constipation, diarrhea, nausea and vomiting  Endocrine: Negative for cold intolerance and heat intolerance  Musculoskeletal: Negative for arthralgias, back pain, gait problem and myalgias  Skin: Negative for color change and rash          Right upper chest bruise/ swelling Neurological: Negative for dizziness, syncope, light-headedness, numbness and headaches  Hematological: Negative for adenopathy  Psychiatric/Behavioral: Negative for agitation and confusion  All other systems reviewed and are negative  Patient Active Problem List   Diagnosis    [facility]    Cervical dysplasia    Leiomyoma of uterus    Lichen sclerosus et atrophicus     Past Medical History:   Diagnosis Date    Atrophic vaginitis     Bacterial vaginitis     Carcinoma of right breast in female, estrogen receptor negative ([facility] )     Upper outer quadrant  Resolved 2017     History of radiation therapy     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)     Malignant neoplasm metastasis to lymph nodes     Missed      Resolved 2015      Past Surgical History:   Procedure Laterality Date    AXILLARY NODE DISSECTION      BREAST LUMPECTOMY Right     BREAST LUMPECTOMY Right 2012    LAPAROSCOPIC TOTAL HYSTERECTOMY      OTHER SURGICAL HISTORY      Chemotherapeutics      Family History   Problem Relation Age of Onset    Coronary artery disease Mother     Colon polyps Mother         Sigmoid colon    Skin cancer Maternal Grandmother     Colon cancer Maternal Grandfather     Colon polyps Maternal Grandfather         Sigmoid colon     Coronary artery disease Paternal Grandfather     Diabetes Paternal Grandfather     Colon polyps Paternal Grandfather         Sigmoid colon    Colon polyps Father         Sigmoid colon     Social History     Social History    Marital status: Single     Spouse name: N/A    Number of children: N/A    Years of education: N/A     Occupational History    Not on file       Social History Main Topics    Smoking status: Never Smoker    Smokeless tobacco: Never Used    Alcohol use Yes    Drug use: No    Sexual activity: Not on file      Comment: hysterectomy     Other Topics Concern    Not on file     Social History Narrative    No narrative on file       Current Outpatient Prescriptions:     Calcium Carbonate-Vit D-Min (CALCIUM 1200 PO), Take by mouth, Disp: , Rfl:     cholecalciferol (VITAMIN D3) 1,000 units tablet, Take 1,000 Units by mouth daily, Disp: , Rfl:     Multiple Vitamin (MULTIVITAMIN) tablet, Take 1 tablet by mouth daily, Disp: , Rfl:     fluticasone (FLONASE) 50 mcg/act nasal spray, 1 spray into each nostril 2 (two) times a day for 5 days, Disp: 16 g, Rfl: 0  Allergies   Allergen Reactions    Tetracyclines & Related GI Intolerance    Augmentin [Amoxicillin-Pot Clavulanate] GI Intolerance and Other (See Comments)     C-diff    Tetracycline GI Intolerance     Vitals:    01/16/19 1257   BP: 118/80   Pulse: 68   Resp: 16   Temp: 97 8 °F (36 6 °C)       Physical Exam   Constitutional: She is oriented to person, place, and time  Vital signs are normal  She appears well-developed and well-nourished  No distress  HENT:   Head: Normocephalic and atraumatic  Neck: Normal range of motion  Pulmonary/Chest: Effort normal        Bilateral breasts were examined in the sitting and supine position  Right breast surgical scar with stable scar tissue noted  There are no masses, skin nodules, nipple changes or nipple discharge  There is no bilateral supraclavicular or axillary lymphadenopathy noted  Abdominal: Soft  Normal appearance  She exhibits no mass  There is no hepatosplenomegaly  There is no tenderness  Musculoskeletal: Normal range of motion  Lymphadenopathy:     She has no axillary adenopathy  Right: No supraclavicular adenopathy present  Left: No supraclavicular adenopathy present  Neurological: She is alert and oriented to person, place, and time  Skin: Skin is warm, dry and intact  No rash noted  She is not diaphoretic  Psychiatric: She has a normal mood and affect  Her speech is normal    Vitals reviewed        Advance Care Planning/Advance Directives:  Discussed disease status, cancer treatment plans and/or cancer treatment goals with the patient

## 2019-07-24 ENCOUNTER — OFFICE VISIT (OUTPATIENT)
Dept: GYNECOLOGY | Facility: CLINIC | Age: 49
End: 2019-07-24
Payer: COMMERCIAL

## 2019-07-24 VITALS
SYSTOLIC BLOOD PRESSURE: 128 MMHG | DIASTOLIC BLOOD PRESSURE: 84 MMHG | BODY MASS INDEX: 24.58 KG/M2 | WEIGHT: 125.2 LBS | HEIGHT: 60 IN | HEART RATE: 60 BPM

## 2019-07-24 DIAGNOSIS — N89.8 VAGINAL DISCHARGE: ICD-10-CM

## 2019-07-24 DIAGNOSIS — B37.3 MONILIAL VULVOVAGINITIS: Primary | ICD-10-CM

## 2019-07-24 DIAGNOSIS — Z11.3 SCREENING EXAMINATION FOR STD (SEXUALLY TRANSMITTED DISEASE): ICD-10-CM

## 2019-07-24 PROCEDURE — 99213 OFFICE O/P EST LOW 20 MIN: CPT | Performed by: OBSTETRICS & GYNECOLOGY

## 2019-07-24 PROCEDURE — 87591 N.GONORRHOEAE DNA AMP PROB: CPT | Performed by: OBSTETRICS & GYNECOLOGY

## 2019-07-24 PROCEDURE — 87210 SMEAR WET MOUNT SALINE/INK: CPT | Performed by: OBSTETRICS & GYNECOLOGY

## 2019-07-24 PROCEDURE — 87491 CHLMYD TRACH DNA AMP PROBE: CPT | Performed by: OBSTETRICS & GYNECOLOGY

## 2019-07-24 RX ORDER — FLUCONAZOLE 150 MG/1
TABLET ORAL
Qty: 2 TABLET | Refills: 0 | Status: SHIPPED | OUTPATIENT
Start: 2019-07-24 | End: 2019-07-28

## 2019-07-24 NOTE — PROGRESS NOTES
Assessment/Plan:    Discussed the scant number of hyphae seen on exam today  Since patient just took Diflucan yesterday and sx are improving, she was asked to wait to take another dose of Diflucan unless sx worsen or persist after a week  Diagnoses and all orders for this visit:    Monilial vulvovaginitis  -     fluconazole (DIFLUCAN) 150 mg tablet; Take one tablet Day 1 and 4    Screening examination for STD (sexually transmitted disease)  -     Chlamydia/GC amplified DNA by PCR    Vaginal discharge  -     POCT wet mount        Subjective:      Patient ID: Sherri Mark is a 52 y o  female  The patient presents with vaginal itching that started at the end of last week with white and sometimes yellow discharge  Denies vaginal odor  Patient took a Diflucan on Tuesday the 23rd with some relief and took second dose yesterday and notices that sx are almost gone, but she wanted to have exam just in case  She did also notice some pink discharge yesterday  She is s/p TLH  She has a hx of breast cancer and has undergone 3 internal and 4 external treatments of Mary B[place]ze Touch which patient states improved dryness and dyspareunia significantly  She has a yearly follow up scheduled for the next MLT  When asked about STI testing, patient was in agreement with a urine being sent for chlamydia and gonorrhea  The following portions of the patient's history were reviewed and updated as appropriate: allergies, current medications, past family history, past medical history, past social history, past surgical history and problem list     Review of Systems   Constitutional: Negative  Respiratory: Negative  Cardiovascular: Negative  Gastrointestinal: Negative  Endocrine: Negative  Genitourinary: Positive for vaginal discharge  Negative for dyspareunia, dysuria, frequency, pelvic pain, urgency, vaginal bleeding and vaginal pain  Musculoskeletal: Negative  Skin: Negative  Neurological: Negative  Psychiatric/Behavioral: Negative  Objective:      /84 (BP Location: Left arm)   Pulse 60   Ht 5' (1 524 m)   Wt 56 8 kg (125 lb 3 2 oz)   BMI 24 45 kg/m²          Physical Exam   Constitutional: She appears well-developed and well-nourished  Abdominal: Hernia confirmed negative in the right inguinal area and confirmed negative in the left inguinal area  Genitourinary: Pelvic exam was performed with patient supine  There is no rash, tenderness or lesion on the right labia  There is no rash, tenderness or lesion on the left labia  Right adnexum displays no mass, no tenderness and no fullness  Left adnexum displays no mass, no tenderness and no fullness  No erythema, tenderness or bleeding in the vagina  No foreign body in the vagina  No signs of injury around the vagina  Vaginal discharge (moderate amount of thin white discharge) found  Genitourinary Comments: Mild to moderate VVA  Uterus and cervix absent, no masses or tenderness noted on BME   Lymphadenopathy: No inguinal adenopathy noted on the right or left side  Nursing note and vitals reviewed

## 2019-07-25 LAB
C TRACH DNA SPEC QL NAA+PROBE: NEGATIVE
N GONORRHOEA DNA SPEC QL NAA+PROBE: NEGATIVE

## 2019-08-08 ENCOUNTER — OFFICE VISIT (OUTPATIENT)
Dept: GYNECOLOGY | Facility: CLINIC | Age: 49
End: 2019-08-08
Payer: COMMERCIAL

## 2019-08-08 VITALS
HEART RATE: 54 BPM | DIASTOLIC BLOOD PRESSURE: 78 MMHG | HEIGHT: 60 IN | WEIGHT: 125 LBS | BODY MASS INDEX: 24.54 KG/M2 | SYSTOLIC BLOOD PRESSURE: 118 MMHG

## 2019-08-08 DIAGNOSIS — N76.0 BV (BACTERIAL VAGINOSIS): Primary | ICD-10-CM

## 2019-08-08 DIAGNOSIS — B96.89 BV (BACTERIAL VAGINOSIS): Primary | ICD-10-CM

## 2019-08-08 PROCEDURE — 99213 OFFICE O/P EST LOW 20 MIN: CPT | Performed by: OBSTETRICS & GYNECOLOGY

## 2019-08-08 RX ORDER — METRONIDAZOLE 500 MG/1
500 TABLET ORAL EVERY 12 HOURS SCHEDULED
Qty: 14 TABLET | Refills: 0 | Status: SHIPPED | OUTPATIENT
Start: 2019-08-08 | End: 2019-08-15

## 2019-08-08 NOTE — PROGRESS NOTES
Patient presents to the office complaining of vaginal discharge  She was treated for yeast infection on July 24th with Diflucan 2 x 2 tablets  At that time she was having some itching and burning  The itching has resolved  Patient has had a prior T LH sec  She also has a history of breast cancer  She has had Sarabjit Nely touch procedure done for vaginal atrophy  She completed this approximately 2 months ago  She denies any fever abdominal pain dysuria fever  Denies any urgency  She has no GI complaints  Physical exam:  The abdomen is soft nontender no masses appreciated no adenopathy  On pelvic exam the uterus and cervix are absent  There are no palpable adnexal masses or tenderness  There is a yellowish discharge present  A wet mount was obtained along with a sure swab culture  External genitalia appear normal     Wet mount:  Clue cells present  Impression:  Bacterial vaginosis    Plan:  Metronidazole 500 mg b i d   For 7 days

## 2019-08-14 LAB
A VAGINAE DNA VAG NAA+PROBE-LOG#: 7.4 LOG (CELLS/ML)
C GLABRATA DNA VAG QL NAA+PROBE: NOT DETECTED
C TRACH RRNA SPEC QL NAA+PROBE: NOT DETECTED
CANDIDA DNA VAG QL NAA+PROBE: NOT DETECTED
G VAGINALIS DNA VAG NAA+PROBE-LOG#: >8 LOG (CELLS/ML)
LACTOBACILLUS DNA VAG NAA+PROBE-LOG#: 6.9 LOG (CELLS/ML)
MEGASPHAERA SP DNA VAG NAA+PROBE-LOG#: NOT DETECTED
N GONORRHOEA RRNA SPEC QL NAA+PROBE: NOT DETECTED
SL AMB BV CATEGORY:: ABNORMAL
SL AMB C. PARAPSILOSIS, DNA: NOT DETECTED
SL AMB C. TROPICALIS, DNA: NOT DETECTED
T VAGINALIS RRNA SPEC QL NAA+PROBE: NOT DETECTED

## 2019-08-22 DIAGNOSIS — B37.3 YEAST VAGINITIS: Primary | ICD-10-CM

## 2019-08-22 RX ORDER — FLUCONAZOLE 150 MG/1
150 TABLET ORAL
Qty: 2 TABLET | Refills: 0 | Status: SHIPPED | OUTPATIENT
Start: 2019-08-22 | End: 2019-08-26

## 2019-08-27 DIAGNOSIS — B96.89 BV (BACTERIAL VAGINOSIS): Primary | ICD-10-CM

## 2019-08-27 DIAGNOSIS — N76.0 BV (BACTERIAL VAGINOSIS): Primary | ICD-10-CM

## 2019-08-27 RX ORDER — CLINDAMYCIN PHOSPHATE 20 MG/G
1 CREAM VAGINAL
Qty: 40 G | Refills: 0 | Status: SHIPPED | OUTPATIENT
Start: 2019-08-27 | End: 2020-03-30 | Stop reason: SDUPTHER

## 2019-09-04 ENCOUNTER — OFFICE VISIT (OUTPATIENT)
Dept: GYNECOLOGY | Facility: CLINIC | Age: 49
End: 2019-09-04
Payer: COMMERCIAL

## 2019-09-04 VITALS
HEIGHT: 60 IN | DIASTOLIC BLOOD PRESSURE: 78 MMHG | WEIGHT: 127.6 LBS | BODY MASS INDEX: 25.05 KG/M2 | HEART RATE: 62 BPM | SYSTOLIC BLOOD PRESSURE: 112 MMHG

## 2019-09-04 DIAGNOSIS — R39.9 UTI SYMPTOMS: Primary | ICD-10-CM

## 2019-09-04 LAB
SL AMB  POCT GLUCOSE, UA: NORMAL
SL AMB LEUKOCYTE ESTERASE,UA: NORMAL
SL AMB POCT BILIRUBIN,UA: NORMAL
SL AMB POCT BLOOD,UA: NORMAL
SL AMB POCT CLARITY,UA: NORMAL
SL AMB POCT COLOR,UA: YELLOW
SL AMB POCT KETONES,UA: NORMAL
SL AMB POCT NITRITE,UA: NORMAL
SL AMB POCT PH,UA: 6
SL AMB POCT SPECIFIC GRAVITY,UA: 1.01
SL AMB POCT URINE PROTEIN: NORMAL
SL AMB POCT UROBILINOGEN: NORMAL

## 2019-09-04 PROCEDURE — 81002 URINALYSIS NONAUTO W/O SCOPE: CPT | Performed by: OBSTETRICS & GYNECOLOGY

## 2019-09-04 PROCEDURE — 99213 OFFICE O/P EST LOW 20 MIN: CPT | Performed by: OBSTETRICS & GYNECOLOGY

## 2019-09-04 RX ORDER — FLUCONAZOLE 150 MG/1
TABLET ORAL
Qty: 2 TABLET | Refills: 0 | Status: SHIPPED | OUTPATIENT
Start: 2019-09-04 | End: 2019-09-06

## 2019-09-04 NOTE — PROGRESS NOTES
Patient was seen in the office on August 8th complaining of vaginal discharge with some burning and irritation  She was placed on Cleocin q h s  For a week  Sure swab did come back equivocal for bacterial vaginosis she was only able to use the Cleocin for a few days and discontinued it because of significant vaginal burning she also started to have increased frequency of urination and dysuria with lower back pain  She denied any fever  She video conference to with the physician who felt that she had a urinary tract infection and she was placed on antibiotics for 5 days  Since this starting the antibiotics her symptoms have completely resolved  She presents today just to be sure she has no vaginitis or persistent urinary tract infection  Physical exam:  Urinalysis negative  On pelvic exam uterus is anteverted normal size contour freely mobile nontender  There are no adnexal masses or tenderness  Vaginal exam reveals no erythema or discharge  External genitalia normal    Impression:  No evidence of urinary tract infection or vaginitis      Plan:  Return to the office p r n /annual

## 2019-09-09 ENCOUNTER — HOSPITAL ENCOUNTER (OUTPATIENT)
Dept: MAMMOGRAPHY | Facility: HOSPITAL | Age: 49
Discharge: HOME | End: 2019-09-09
Payer: COMMERCIAL

## 2019-09-09 VITALS — HEIGHT: 60 IN | BODY MASS INDEX: 24.94 KG/M2 | WEIGHT: 127 LBS

## 2019-09-09 DIAGNOSIS — Z12.31 VISIT FOR SCREENING MAMMOGRAM: ICD-10-CM

## 2019-09-09 PROCEDURE — 77067 SCR MAMMO BI INCL CAD: CPT

## 2019-09-09 PROCEDURE — 77063 BREAST TOMOSYNTHESIS BI: CPT

## 2019-09-10 ENCOUNTER — OFFICE VISIT (OUTPATIENT)
Dept: SURGICAL ONCOLOGY | Facility: CLINIC | Age: 49
End: 2019-09-10
Payer: COMMERCIAL

## 2019-09-10 VITALS
DIASTOLIC BLOOD PRESSURE: 60 MMHG | RESPIRATION RATE: 16 BRPM | SYSTOLIC BLOOD PRESSURE: 100 MMHG | TEMPERATURE: 98 F | HEART RATE: 67 BPM | WEIGHT: 124 LBS | BODY MASS INDEX: 24.35 KG/M2 | HEIGHT: 60 IN

## 2019-09-10 DIAGNOSIS — Z85.3 HISTORY OF RIGHT BREAST CANCER: ICD-10-CM

## 2019-09-10 DIAGNOSIS — Z12.31 VISIT FOR SCREENING MAMMOGRAM: ICD-10-CM

## 2019-09-10 DIAGNOSIS — Z08 ENCOUNTER FOR FOLLOW-UP EXAMINATION AFTER COMPLETED TREATMENT FOR MALIGNANT NEOPLASM: Primary | ICD-10-CM

## 2019-09-10 PROCEDURE — 99213 OFFICE O/P EST LOW 20 MIN: CPT | Performed by: NURSE PRACTITIONER

## 2019-09-10 NOTE — PROGRESS NOTES
Surgical Oncology Follow Up       [address] [facility] SURGICAL ONCOLOGY [place]  [address] [facility] [address]    [place] [address]  1970  [id number]  [address] [facility] SURGICAL ONCOLOGY [place]  [address] [address]    Chief Complaint   Patient presents with    Breast Cancer     Pt is here for 1 year follow up        Assessment/Plan:  1  Encounter for follow-up examination after completed treatment for malignant neoplasm    2  History of right breast cancer  - 1 year f/u visit    3  Visit for screening mammogram  - Mammo screening bilateral w 3d & cad; Future         Discussion/Summary: Patient is a 80-year-old female who presents today for a one-year follow-up for right breast cancer diagnosed in July 2011  Pathology revealed invasive ductal carcinoma, ER negative, [place] negative, HER-2 positive  She underwent neoadjuvant chemotherapy (AC x 4, Paclitaxel, Herceptin) with a complete pathological response  She then underwent a right lumpectomy and right axillary dissection by Dr Brad Ambriz and completed radiation therapy  She tested negative for the BRCA mutation  She had a bilateral mammogram performed on September 9, 2019 which was BIRADS 1, category 2 density  She has no new complaints and there are no concerns on today's exam  I've encouraged her to start weight training with low weights and advanced as tolerated, as she reports she gets discomfort around her surgical incisions every time she does weight training exercises  If she is still experiencing the discomfort, I will refer her to physical therapy  I will order a bilateral mammogram for September 2020 and we will plan to see the patient back in 1 year or sooner if the need arises  She was instructed to call with any new concerns or symptoms prior to that time  All of her questions were answered      History of Present Illness:     Oncology History    Diagnosis and Staging: July 2011: Locally advanced right breast invasive ductal carcinoma, Stage III, complete pathologic response 2012negative, LA negative, HER-2 positive, Mammaprint high-risk      Treatment History: 2011: Neoadjuvant dose dense Adriamycin and Cytoxan followed by paclitaxel followed by Herceptin  2011: Right lumpectomy with axillary dissection      Current Therapy: Observation         History of right breast cancer        -Interval History:  Patient presents today for a 1 year follow-up visit for right breast cancer diagnosed in 2011  She had a bilateral mammogram performed on September 9, 2019 which was BI-RADS 1, category 2 density  Denies headaches, back pain, bone pain, cough, shortness of breath, abdominal pain  She does report discomfort around her surgical sites with weight training exercises  Review of Systems:  Review of Systems   Constitutional: Negative for activity change, appetite change, chills, fatigue, fever and unexpected weight change  HENT: Negative for trouble swallowing  Eyes: Negative for pain, redness and visual disturbance  Respiratory: Negative for cough, shortness of breath and wheezing  Cardiovascular: Negative for chest pain, palpitations and leg swelling  Gastrointestinal: Negative for abdominal pain, constipation, diarrhea, nausea and vomiting  Endocrine: Negative for cold intolerance and heat intolerance  Musculoskeletal: Negative for arthralgias, back pain, gait problem and myalgias  Skin: Negative for color change and rash  Neurological: Negative for dizziness, syncope, light-headedness, numbness and headaches  Hematological: Negative for adenopathy  Psychiatric/Behavioral: Negative for agitation and confusion  All other systems reviewed and are negative        Patient Active Problem List   Diagnosis    History of right breast cancer    Cervical dysplasia    Leiomyoma of uterus    Lichen sclerosus et atrophicus    Contusion of right chest wall    Encounter for follow-up examination after completed treatment for malignant neoplasm     Past Medical History:   Diagnosis Date    Atrophic vaginitis     Bacterial vaginitis     BRCA1 negative     Carcinoma of right breast in female, estrogen receptor negative ([facility] )     Upper outer quadrant  Resolved 2017     History of radiation therapy     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)     Malignant neoplasm metastasis to lymph nodes     Missed      Resolved 2015      Past Surgical History:   Procedure Laterality Date    AXILLARY NODE DISSECTION      BREAST LUMPECTOMY Right     BREAST LUMPECTOMY Right 2012    LAPAROSCOPIC TOTAL HYSTERECTOMY      OTHER SURGICAL HISTORY      Chemotherapeutics      Family History   Problem Relation Age of Onset    Coronary artery disease Mother     Colon polyps Mother         Sigmoid colon    Skin cancer Maternal Grandmother     Colon cancer Maternal Grandfather     Colon polyps Maternal Grandfather         Sigmoid colon     Coronary artery disease Paternal Grandfather     Diabetes Paternal Grandfather     Colon polyps Paternal Grandfather         Sigmoid colon    Colon polyps Father         Sigmoid colon     Social History     Socioeconomic History    Marital status: Single     Spouse name: Not on file    Number of children: Not on file    Years of education: Not on file    Highest education level: Not on file   Occupational History    Not on file   Social Needs    Financial resource strain: Not on file    Food insecurity:     Worry: Not on file     Inability: Not on file    Transportation needs:     Medical: Not on file     Non-medical: Not on file   Tobacco Use    Smoking status: Never Smoker    Smokeless tobacco: Never Used   Substance and Sexual Activity    Alcohol use:  Yes    Drug use: No    Sexual activity: Yes     Comment: hysterectomy   Lifestyle    Physical activity:     Days per week: Not on file     Minutes per session: Not on file    Stress: Not on file   Relationships    Social connections:     Talks on phone: Not on file     Gets together: Not on file     Attends Rastafari service: Not on file     Active member of club or organization: Not on file     Attends meetings of clubs or organizations: Not on file     Relationship status: Not on file    Intimate partner violence:     Fear of current or ex partner: Not on file     Emotionally abused: Not on file     Physically abused: Not on file     Forced sexual activity: Not on file   Other Topics Concern    Not on file   Social History Narrative    Not on file       Current Outpatient Medications:     Calcium Carbonate-Vit D-Min (CALCIUM 1200 PO), Take by mouth, Disp: , Rfl:     cholecalciferol (VITAMIN D3) 1,000 units tablet, Take 1,000 Units by mouth daily, Disp: , Rfl:     Multiple Vitamin (MULTIVITAMIN) tablet, Take 1 tablet by mouth daily, Disp: , Rfl:     clindamycin (CLEOCIN) 2 % vaginal cream, Insert 1 applicator into the vagina daily at bedtime (Patient not taking: Reported on 9/4/2019), Disp: 40 g, Rfl: 0    fluticasone (FLONASE) 50 mcg/act nasal spray, 1 spray into each nostril 2 (two) times a day for 5 days, Disp: 16 g, Rfl: 0  Allergies   Allergen Reactions    Tetracyclines & Related GI Intolerance    Augmentin [Amoxicillin-Pot Clavulanate] GI Intolerance and Other (See Comments)     C-diff    Tetracycline GI Intolerance     Vitals:    09/10/19 0804   BP: 100/60   Pulse: 67   Resp: 16   Temp: 98 °F (36 7 °C)       Physical Exam   Constitutional: She is oriented to person, place, and time  Vital signs are normal  She appears well-developed and well-nourished  No distress  HENT:   Head: Normocephalic and atraumatic  Neck: Normal range of motion  Cardiovascular: Normal rate, regular rhythm and normal heart sounds  Pulmonary/Chest: Effort normal and breath sounds normal    Bilateral breasts were examined in the sitting and supine position   Right breast surgical scar with stable scar tissue noted  Right axillary scar present  There are no dominant masses, skin nodules, nipple changes or nipple discharge  There is no bilateral supraclavicular or axillary lymphadenopathy noted  Abdominal: Soft  Normal appearance  She exhibits no mass  There is no hepatosplenomegaly  There is no tenderness  Musculoskeletal: Normal range of motion  Lymphadenopathy:     She has no axillary adenopathy  Right: No supraclavicular adenopathy present  Left: No supraclavicular adenopathy present  Neurological: She is alert and oriented to person, place, and time  Skin: Skin is warm, dry and intact  No rash noted  She is not diaphoretic  Psychiatric: She has a normal mood and affect  Her speech is normal    Vitals reviewed  Results:    Imaging  Mammo Screening Bilateral W 3d & Cad    Result Date: 9/9/2019  Narrative: DIAGNOSIS: Visit for screening mammogram RELEVANT HISTORY: Family Breast Cancer History: No known family history of breast cancer  Family Medical History: Family medical history includes colon cancer in maternal grandfather  Personal History: No known relevant hormone history  Surgical history includes lumpectomy  Medical history includes breast cancer and BRCA 1 negative  COMPARISONS: Prior mammograms dated: 09/05/2018, 09/01/2017, 08/29/2016, 08/28/2015, and 08/21/2014 INDICATION: Sabi Quiñonez is a 52 y o  female presenting for screening mammography  TECHNIQUE: The current study was evaluated with Computer Aided Detection  TISSUE DENSITY: There are scattered areas of fibroglandular density  The patient is scheduled in a reminder system for screening mammography  8-10% of cancers will be missed on mammography  Management of a palpable abnormality must be based on clinical grounds  Patients will be notified of their results via letter from our facility  Accredited by [employer] of [employer] and FDA   RISK ASSESSMENT: 5 Year Channinger-Baljit: No Score 10 Year Tyrer-Cuzick: No Score Lifetime Tyrer-Cuzick: No Score FINDINGS: There are no suspicious masses, grouped microcalcifications or areas of architectural distortion  Impression: No mammographic evidence of malignancy  ASSESSMENT/BI-RADS CATEGORY: Left: 1 - Negative Right: 1 - Negative Overall: 1 - Negative RECOMMENDATION:      - Diagnostic mammogram in 1 year for both breasts  Workstation ID: [id number]       I reviewed the above imaging data  Advance Care Planning/Advance Directives:  Discussed disease status, cancer treatment plans and/or cancer treatment goals with the patient

## 2019-10-02 ENCOUNTER — OFFICE VISIT (OUTPATIENT)
Dept: GYNECOLOGY | Facility: CLINIC | Age: 49
End: 2019-10-02
Payer: COMMERCIAL

## 2019-10-02 VITALS
HEIGHT: 60 IN | DIASTOLIC BLOOD PRESSURE: 62 MMHG | HEART RATE: 78 BPM | WEIGHT: 124.8 LBS | SYSTOLIC BLOOD PRESSURE: 114 MMHG | BODY MASS INDEX: 24.5 KG/M2

## 2019-10-02 DIAGNOSIS — R39.9 URINARY TRACT INFECTION SYMPTOMS: Primary | ICD-10-CM

## 2019-10-02 DIAGNOSIS — N76.0 BACTERIAL VAGINITIS: ICD-10-CM

## 2019-10-02 DIAGNOSIS — B96.89 BACTERIAL VAGINITIS: ICD-10-CM

## 2019-10-02 LAB
SL AMB  POCT GLUCOSE, UA: NORMAL
SL AMB LEUKOCYTE ESTERASE,UA: NORMAL
SL AMB POCT BILIRUBIN,UA: NORMAL
SL AMB POCT BLOOD,UA: NORMAL
SL AMB POCT CLARITY,UA: NORMAL
SL AMB POCT COLOR,UA: NORMAL
SL AMB POCT KETONES,UA: NORMAL
SL AMB POCT NITRITE,UA: NORMAL
SL AMB POCT PH,UA: 7
SL AMB POCT SPECIFIC GRAVITY,UA: 1.03
SL AMB POCT URINE PROTEIN: NORMAL
SL AMB POCT UROBILINOGEN: NORMAL

## 2019-10-02 PROCEDURE — 99213 OFFICE O/P EST LOW 20 MIN: CPT | Performed by: OBSTETRICS & GYNECOLOGY

## 2019-10-02 PROCEDURE — 81002 URINALYSIS NONAUTO W/O SCOPE: CPT | Performed by: OBSTETRICS & GYNECOLOGY

## 2019-10-02 NOTE — PROGRESS NOTES
Patient presents to the office today complaining of some irritation at the urethra  She has a slight change in frequency of urination denies any dysuria, hematuria urgency fever, abdominal or back pain  Patient has a history of stage III breast cancer  She is status post [address] [place] [address] Av the treatments for atrophic vaginitis  She had 3 internal treatments and for external treatments  She is due for follow-up treatments in February of 2020  She was treated for bacterial vaginosis in August   She presented in September with the symptoms  Urinalysis was negative and sure swab cultures at that time were negative  Physical exam:  Abdomen is soft nontender no masses appreciated on pelvic exam uterus and cervix are absent there are no palpable adnexal masses or tenderness per  There is a discharge present  Wet mount was obtained and revealed clue cells  Urinalysis was obtained and was negative      Impression:  Bacterial vaginosis plan:  Start Cleocin vaginal cream   If symptoms persist I recommended to return to the office for potassium sensitivity test to rule out interstitial cystitis

## 2019-10-07 DIAGNOSIS — N76.0 BV (BACTERIAL VAGINOSIS): ICD-10-CM

## 2019-10-07 DIAGNOSIS — B96.89 BV (BACTERIAL VAGINOSIS): ICD-10-CM

## 2019-10-09 ENCOUNTER — OFFICE VISIT (OUTPATIENT)
Dept: URGENT CARE | Facility: CLINIC | Age: 49
End: 2019-10-09
Payer: COMMERCIAL

## 2019-10-09 VITALS
HEIGHT: 60 IN | BODY MASS INDEX: 23.95 KG/M2 | HEART RATE: 100 BPM | DIASTOLIC BLOOD PRESSURE: 59 MMHG | SYSTOLIC BLOOD PRESSURE: 111 MMHG | OXYGEN SATURATION: 98 % | RESPIRATION RATE: 20 BRPM | TEMPERATURE: 96.8 F | WEIGHT: 122 LBS

## 2019-10-09 DIAGNOSIS — J04.0 LARYNGITIS, ACUTE: Primary | ICD-10-CM

## 2019-10-09 PROCEDURE — 99213 OFFICE O/P EST LOW 20 MIN: CPT | Performed by: FAMILY MEDICINE

## 2019-10-09 RX ORDER — PREDNISONE 20 MG/1
20 TABLET ORAL DAILY
Qty: 5 TABLET | Refills: 0 | Status: SHIPPED | OUTPATIENT
Start: 2019-10-09 | End: 2019-10-14

## 2019-10-09 RX ORDER — FLUCONAZOLE 150 MG/1
TABLET ORAL AS NEEDED
Refills: 0 | COMMUNITY
Start: 2019-10-04 | End: 2020-05-19

## 2019-10-09 NOTE — PROGRESS NOTES
[facility]        NAME: Jovana Quiñones a 52 y o  female  : 1970    MRN: [id number]  DATE: 2019  TIME: 8:58 AM    Assessment and Plan   Laryngitis, acute [J04 0]  1  Laryngitis, acute  predniSONE 20 mg tablet     Laryngitis likely secondary to postnasal drip from rhinitis  Strep pharyngitis and pneumonia unlikely per clinical evaluation  Patient advised on supportive therapy including remaining well hydrated, avoiding cold fluids and gargling with warm salt water twice daily  Five days of low-dose prednisone prescribed  Encouraged to take a daily antihistamine such as Claritin, Allegra or Zyrtec and consider Flonase if needed  May f/u with PCP in a few days if Sxs worsen  Dispensed medication:  Cetirizine 10 mg    Patient Instructions     Follow up with PCP in 3-5 days  Proceed to  ER if symptoms worsen  Chief Complaint     Chief Complaint   Patient presents with    Cold Like Symptoms     laryngitis started yesterday  productive cough  chills  clogged ears, post nasal drip, nose congestion  taking flonase and mucinex D  History of Present Illness       51-year-old female presents today with 3-4 days of upper respiratory tract infection like symptoms which started with soreness and progressed to coughing, nasal congestion, postnasal drip and hoarseness  Recalls that she had 1 episode of emesis followed by a pop sensation in her neck  The next morning she woke up with hoarseness  Denies any obvious fevers, generalized myalgias or nausea  Reports that she has a tendency to vomit postnasal drip  No obvious sick contacts  Review of Systems   Review of Systems   Constitutional: Positive for fatigue  Negative for fever  HENT: Positive for congestion, postnasal drip and voice change  Respiratory: Positive for cough (productive)  Gastrointestinal: Positive for vomiting  Negative for nausea  Musculoskeletal: Negative for myalgias  Allergic/Immunologic: Positive for environmental allergies  Current Medications       Current Outpatient Medications:     Calcium Carbonate-Vit D-Min (CALCIUM 1200 PO), Take by mouth, Disp: , Rfl:     cholecalciferol (VITAMIN D3) 1,000 units tablet, Take 1,000 Units by mouth daily, Disp: , Rfl:     fluconazole (DIFLUCAN) 150 mg tablet, as needed , Disp: , Rfl: 0    Multiple Vitamin (MULTIVITAMIN) tablet, Take 1 tablet by mouth daily, Disp: , Rfl:     clindamycin (CLEOCIN) 2 % vaginal cream, Insert 1 applicator into the vagina daily at bedtime (Patient not taking: Reported on 2019), Disp: 40 g, Rfl: 0    fluticasone (FLONASE) 50 mcg/act nasal spray, 1 spray into each nostril 2 (two) times a day for 5 days, Disp: 16 g, Rfl: 0    predniSONE 20 mg tablet, Take 1 tablet (20 mg total) by mouth daily for 5 days, Disp: 5 tablet, Rfl: 0    Current Allergies     Allergies as of 10/09/2019 - Reviewed 10/09/2019   Allergen Reaction Noted    Tetracyclines & related GI Intolerance 2015    Augmentin [amoxicillin-pot clavulanate] GI Intolerance and Other (See Comments) 2015    Tetracycline GI Intolerance 2018            The following portions of the patient's history were reviewed and updated as appropriate: allergies, current medications, past family history, past medical history, past social history, past surgical history and problem list      Past Medical History:   Diagnosis Date    Atrophic vaginitis     Bacterial vaginitis     BRCA1 negative     Carcinoma of right breast in female, estrogen receptor negative ([facility] [address] )     Upper outer quadrant   Resolved 2017     History of radiation therapy     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)     Malignant neoplasm metastasis to lymph nodes     Missed      Resolved 2015        Past Surgical History:   Procedure Laterality Date    AXILLARY NODE DISSECTION      BREAST LUMPECTOMY Right     BREAST LUMPECTOMY Right 2012    LAPAROSCOPIC TOTAL HYSTERECTOMY      OTHER SURGICAL HISTORY      Chemotherapeutics        Family History   Problem Relation Age of Onset    Coronary artery disease Mother     Colon polyps Mother         Sigmoid colon    Skin cancer Maternal Grandmother     Colon cancer Maternal Grandfather     Colon polyps Maternal Grandfather         Sigmoid colon     Coronary artery disease Paternal Grandfather     Diabetes Paternal Grandfather     Colon polyps Paternal Grandfather         Sigmoid colon    Colon polyps Father         Sigmoid colon         Medications have been verified  Objective   /59   Pulse 100   Temp (!) 96 8 °F (36 °C)   Resp 20   Ht 5' (1 524 m)   Wt 55 3 kg (122 lb)   SpO2 98%   BMI 23 83 kg/m²        Physical Exam     Physical Exam   Constitutional: She appears well-developed and well-nourished  She appears distressed (Intermittent coughing; hoarseness)  HENT:   Head: Normocephalic and atraumatic  Right Ear: External ear normal    Left Ear: External ear normal    Eyes: Conjunctivae are normal    Neck: No thyromegaly present  Cardiovascular: Regular rhythm  Pulmonary/Chest: Effort normal and breath sounds normal    Lymphadenopathy:     She has no cervical adenopathy  Neurological: She is alert  Skin: Skin is warm  She is not diaphoretic  No erythema  Psychiatric: She has a normal mood and affect  Her behavior is normal  Judgment and thought content normal    Nursing note and vitals reviewed

## 2020-01-02 ENCOUNTER — DOCUMENTATION (OUTPATIENT)
Dept: GYNECOLOGY | Facility: CLINIC | Age: 50
End: 2020-01-02

## 2020-01-02 DIAGNOSIS — B96.89 BACTERIAL VAGINITIS: Primary | ICD-10-CM

## 2020-01-02 DIAGNOSIS — N76.0 BACTERIAL VAGINITIS: Primary | ICD-10-CM

## 2020-01-03 RX ORDER — METRONIDAZOLE 500 MG/1
500 TABLET ORAL EVERY 12 HOURS SCHEDULED
Qty: 14 TABLET | Refills: 0 | Status: SHIPPED | OUTPATIENT
Start: 2020-01-03 | End: 2020-01-10

## 2020-03-03 ENCOUNTER — OFFICE VISIT (OUTPATIENT)
Dept: GASTROENTEROLOGY | Facility: CLINIC | Age: 50
End: 2020-03-03
Payer: COMMERCIAL

## 2020-03-03 VITALS
DIASTOLIC BLOOD PRESSURE: 82 MMHG | HEART RATE: 62 BPM | WEIGHT: 123 LBS | BODY MASS INDEX: 24.15 KG/M2 | SYSTOLIC BLOOD PRESSURE: 122 MMHG | HEIGHT: 60 IN

## 2020-03-03 DIAGNOSIS — K62.5 RECTAL BLEEDING: Primary | ICD-10-CM

## 2020-03-03 PROCEDURE — 99203 OFFICE O/P NEW LOW 30 MIN: CPT | Performed by: PHYSICIAN ASSISTANT

## 2020-03-03 NOTE — PATIENT INSTRUCTIONS
Rectal Bleeding   WHAT YOU NEED TO KNOW:   Rectal bleeding can be caused by constipation, hemorrhoids, or anal fissures  It may also be caused by polyps, tumors, or medical conditions, such as colitis or diverticulitis  DISCHARGE INSTRUCTIONS:   Medicines:   · Pain medicine: You may be given medicine to take away or decrease pain  Do not wait until the pain is severe before you take your medicine  · Iron supplement:  Iron helps your body make more red blood cells  · Steroids: This medicine decreases inflammation in your rectum  It may be applied as a cream, ointment, or lotion  · Take your medicine as directed  Contact your healthcare provider if you think your medicine is not helping or if you have side effects  Tell him of her if you are allergic to any medicine  Keep a list of the medicines, vitamins, and herbs you take  Include the amounts, and when and why you take them  Bring the list or the pill bottles to follow-up visits  Carry your medicine list with you in case of an emergency  Follow up with your healthcare provider as directed:  Write down your questions so you remember to ask them during your visits  Drink liquids as directed:  Ask your healthcare provider how much liquid to drink each day and which liquids are best for you  This will help prevent dehydration and constipation  Contact your healthcare provider if:   · You have a fever  · Your rectal bleeding stopped for a time, but has started again  · You have nausea  · You have cold, sweaty, pale skin  · You have changes in your bowel movements, such as diarrhea  · You have questions or concerns about your condition or care  Return to the emergency department if:   · You are breathing faster than usual     · You are dizzy, lightheaded, or feel faint  · You are confused or cannot think clearly  · You urinate less than usual or not at all  · Your rectal bleeding is constant or heavy      · You have severe abdominal pain or cramping  © 2017 [facility] Information is for End User's use only and may not be sold, redistributed or otherwise used for commercial purposes  All illustrations and images included in CareNotes® are the copyrighted property of A D A M , Inc  or [employer]  The above information is an  only  It is not intended as medical advice for individual conditions or treatments  Talk to your doctor, nurse or pharmacist before following any medical regimen to see if it is safe and effective for you

## 2020-03-03 NOTE — PROGRESS NOTES
[facility] - Outpatient Consultation  Monty Flores 52 y o  female MRN: [id number]  Encounter: [id number]          ASSESSMENT AND PLAN:      1  Rectal bleeding  Will plan colonoscopy  Patient will continue high-fiber supplementation  ______________________________________________________________________    HPI:   45-year-old female who is here with rectal bleeding on and off for the past 2 months  Patient reports the blood that she sees is bright red and has been mixed within the stool  Patient reports a family history of colon cancer in her paternal grandfather as well as a significant family history of colon polyps in her mother and father  Patient reports that when the bleeding started she was straining to have bowel movements  Patient denies any abdominal pain  Patient denies any nausea or vomiting  Patient has never had a colonoscopy in the past       REVIEW OF SYSTEMS:    CONSTITUTIONAL: Denies any fever, chills, rigors, and weight loss  HEENT: No earache or tinnitus  Denies hearing loss or visual disturbances  CARDIOVASCULAR: No chest pain or palpitations  RESPIRATORY: Denies any cough, hemoptysis, shortness of breath or dyspnea on exertion  GASTROINTESTINAL: As noted in the History of Present Illness  GENITOURINARY: No problems with urination  Denies any hematuria or dysuria  NEUROLOGIC: No dizziness or vertigo, denies headaches  MUSCULOSKELETAL: Denies any muscle or joint pain  SKIN: Denies skin rashes or itching  ENDOCRINE: Denies excessive thirst  Denies intolerance to heat or cold  PSYCHOSOCIAL: Denies depression or anxiety  Denies any recent memory loss  Historical Information   Past Medical History:   Diagnosis Date    Atrophic vaginitis     Bacterial vaginitis     BRCA1 negative     Carcinoma of right breast in female, estrogen receptor negative ([facility] [address] )     Upper outer quadrant   Resolved 9/6/2017     History of radiation therapy     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)     Malignant neoplasm metastasis to lymph nodes     Missed      Resolved 2015      Past Surgical History:   Procedure Laterality Date    AXILLARY NODE DISSECTION      BREAST LUMPECTOMY Right     BREAST LUMPECTOMY Right 2012    LAPAROSCOPIC TOTAL HYSTERECTOMY      OTHER SURGICAL HISTORY      Chemotherapeutics      Social History   Social History     Substance and Sexual Activity   Alcohol Use Yes    Frequency: Monthly or less     Social History     Substance and Sexual Activity   Drug Use No     Social History     Tobacco Use   Smoking Status Never Smoker   Smokeless Tobacco Never Used     Family History   Problem Relation Age of Onset    Coronary artery disease Mother     Colon polyps Mother         Sigmoid colon    Skin cancer Maternal Grandmother     Colon cancer Maternal Grandfather     Colon polyps Maternal Grandfather         Sigmoid colon     Coronary artery disease Paternal Grandfather     Diabetes Paternal Grandfather     Colon polyps Paternal Grandfather         Sigmoid colon    Colon polyps Father         Sigmoid colon       Meds/Allergies       Current Outpatient Medications:     Multiple Vitamin (MULTIVITAMIN) tablet    Calcium Carbonate-Vit D-Min (CALCIUM 1200 PO)    cholecalciferol (VITAMIN D3) 1,000 units tablet    clindamycin (CLEOCIN) 2 % vaginal cream    fluconazole (DIFLUCAN) 150 mg tablet    fluticasone (FLONASE) 50 mcg/act nasal spray    Allergies   Allergen Reactions    Tetracyclines & Related GI Intolerance    Augmentin [Amoxicillin-Pot Clavulanate] GI Intolerance and Other (See Comments)     C-diff    Tetracycline GI Intolerance           Objective     Blood pressure 122/82, pulse 62, height 5' (1 524 m), weight 55 8 kg (123 lb)  Body mass index is 24 02 kg/m²          PHYSICAL EXAM:      General Appearance:   Alert, cooperative, no distress   HEENT:   Normocephalic, atraumatic, anicteric      Neck:  Supple, symmetrical, trachea midline   Lungs:   Clear to auscultation bilaterally; no rales, rhonchi or wheezing; respirations unlabored    Heart[de-identified]   Regular rate and rhythm; no murmur, rub, or gallop  Abdomen:   Soft, non-tender, non-distended; normal bowel sounds; no masses, no organomegaly    Genitalia:   Deferred    Rectal:   Deferred    Extremities:  No cyanosis, clubbing or edema    Pulses:  2+ and symmetric    Skin:  No jaundice, rashes, or lesions    Lymph nodes:  No palpable cervical lymphadenopathy        Lab Results:   No visits with results within 1 Day(s) from this visit  Latest known visit with results is:   Office Visit on 10/02/2019   Component Date Value    LEUKOCYTE ESTERASE,UA 10/02/2019 NEG     James Burton 10/02/2019 -     SL AMB POCT UROBILINOGEN 10/02/2019 -     POCT URINE PROTEIN 10/02/2019 -      PH,UA 10/02/2019 7 0     BLOOD,UA 10/02/2019 -     SPECIFIC GRAVITY,UA 10/02/2019 1 030     KETONES,UA 10/02/2019 -     BILIRUBIN,UA 10/02/2019 -     GLUCOSE, UA 10/02/2019 -      COLOR,UA 10/02/2019 LIGHT YELLOW     CLARITY,UA 10/02/2019 CLOUDY          Radiology Results:   No results found

## 2020-03-03 NOTE — LETTER
March 5, 2020     MD [address]  [address] [address]    Patient: Monty Meyer   YOB: 1970   Date of Visit: 3/3/2020       Dear Dr Rex Garcia: Thank you for referring Monty Meyer to me for evaluation  Below are my notes for this consultation  If you have questions, please do not hesitate to call me  I look forward to following your patient along with you  Sincerely,        Courtney Garcia PA-C        CC: No Recipients  Emmett Joaquin  3/3/2020 11:31 AM  Attested  [facility] - Outpatient Consultation  Monty Meyer 52 y o  female MRN: [id number]  Encounter: [id number]          ASSESSMENT AND PLAN:      1  Rectal bleeding  Will plan colonoscopy  Patient will continue high-fiber supplementation  ______________________________________________________________________    HPI:   80-year-old female who is here with rectal bleeding on and off for the past 2 months  Patient reports the blood that she sees is bright red and has been mixed within the stool  Patient reports a family history of colon cancer in her paternal grandfather as well as a significant family history of colon polyps in her mother and father  Patient reports that when the bleeding started she was straining to have bowel movements  Patient denies any abdominal pain  Patient denies any nausea or vomiting  Patient has never had a colonoscopy in the past       REVIEW OF SYSTEMS:    CONSTITUTIONAL: Denies any fever, chills, rigors, and weight loss  HEENT: No earache or tinnitus  Denies hearing loss or visual disturbances  CARDIOVASCULAR: No chest pain or palpitations  RESPIRATORY: Denies any cough, hemoptysis, shortness of breath or dyspnea on exertion  GASTROINTESTINAL: As noted in the History of Present Illness  GENITOURINARY: No problems with urination  Denies any hematuria or dysuria  NEUROLOGIC: No dizziness or vertigo, denies headaches  MUSCULOSKELETAL: Denies any muscle or joint pain  SKIN: Denies skin rashes or itching  ENDOCRINE: Denies excessive thirst  Denies intolerance to heat or cold  PSYCHOSOCIAL: Denies depression or anxiety  Denies any recent memory loss  Historical Information   Past Medical History:   Diagnosis Date    Atrophic vaginitis     Bacterial vaginitis     BRCA1 negative     Carcinoma of right breast in female, estrogen receptor negative ([facility] [address] )     Upper outer quadrant   Resolved 2017     History of radiation therapy     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)     Malignant neoplasm metastasis to lymph nodes     Missed      Resolved 2015      Past Surgical History:   Procedure Laterality Date    AXILLARY NODE DISSECTION      BREAST LUMPECTOMY Right     BREAST LUMPECTOMY Right 2012    LAPAROSCOPIC TOTAL HYSTERECTOMY      OTHER SURGICAL HISTORY      Chemotherapeutics      Social History   Social History     Substance and Sexual Activity   Alcohol Use Yes    Frequency: Monthly or less     Social History     Substance and Sexual Activity   Drug Use No     Social History     Tobacco Use   Smoking Status Never Smoker   Smokeless Tobacco Never Used     Family History   Problem Relation Age of Onset    Coronary artery disease Mother     Colon polyps Mother         Sigmoid colon    Skin cancer Maternal Grandmother     Colon cancer Maternal Grandfather     Colon polyps Maternal Grandfather         Sigmoid colon     Coronary artery disease Paternal Grandfather     Diabetes Paternal Grandfather     Colon polyps Paternal Grandfather         Sigmoid colon    Colon polyps Father         Sigmoid colon       Meds/Allergies       Current Outpatient Medications:     Multiple Vitamin (MULTIVITAMIN) tablet    Calcium Carbonate-Vit D-Min (CALCIUM 1200 PO)    cholecalciferol (VITAMIN D3) 1,000 units tablet    clindamycin (CLEOCIN) 2 % vaginal cream    fluconazole (DIFLUCAN) 150 mg tablet   fluticasone (FLONASE) 50 mcg/act nasal spray    Allergies   Allergen Reactions    Tetracyclines & Related GI Intolerance    Augmentin [Amoxicillin-Pot Clavulanate] GI Intolerance and Other (See Comments)     C-diff    Tetracycline GI Intolerance           Objective     Blood pressure 122/82, pulse 62, height 5' (1 524 m), weight 55 8 kg (123 lb)  Body mass index is 24 02 kg/m²  PHYSICAL EXAM:      General Appearance:   Alert, cooperative, no distress   HEENT:   Normocephalic, atraumatic, anicteric      Neck:  Supple, symmetrical, trachea midline   Lungs:   Clear to auscultation bilaterally; no rales, rhonchi or wheezing; respirations unlabored    Heart[de-identified]   Regular rate and rhythm; no murmur, rub, or gallop  Abdomen:   Soft, non-tender, non-distended; normal bowel sounds; no masses, no organomegaly    Genitalia:   Deferred    Rectal:   Deferred    Extremities:  No cyanosis, clubbing or edema    Pulses:  2+ and symmetric    Skin:  No jaundice, rashes, or lesions    Lymph nodes:  No palpable cervical lymphadenopathy        Lab Results:   No visits with results within 1 Day(s) from this visit  Latest known visit with results is:   Office Visit on 10/02/2019   Component Date Value    LEUKOCYTE ESTERASE,UA 10/02/2019 NEG     Macie June 10/02/2019 -     SL AMB POCT UROBILINOGEN 10/02/2019 -     POCT URINE PROTEIN 10/02/2019 -      PH,UA 10/02/2019 7 0     BLOOD,UA 10/02/2019 -     SPECIFIC GRAVITY,UA 10/02/2019 1 030     KETONES,UA 10/02/2019 -     BILIRUBIN,UA 10/02/2019 -     GLUCOSE, UA 10/02/2019 -      COLOR,UA 10/02/2019 LIGHT YELLOW     CLARITY,UA 10/02/2019 CLOUDY          Radiology Results:   No results found  Attestation signed by Ariadna Williamson DO at 3/3/2020  3:07 PM:  I reviewed the chart  I supervised my physician assistant and I agree with her assessment and plan

## 2020-03-10 ENCOUNTER — TELEPHONE (OUTPATIENT)
Dept: GASTROENTEROLOGY | Facility: CLINIC | Age: 50
End: 2020-03-10

## 2020-03-10 NOTE — TELEPHONE ENCOUNTER
Gibson Natarajan pt        Pt has a colon set up for 3/16 she is questioning whether or not she can take her allergy med zyrtec

## 2020-03-12 NOTE — TELEPHONE ENCOUNTER
Patient called lmom - Patient has questions about her colonoscopy scheduled for 03/16/20   Please call Levi Machado at [phone] ty

## 2020-03-13 NOTE — TELEPHONE ENCOUNTER
Patient called - Has questions about her colonoscopy scheduled for Monday 03/16/20   Please call Constanza West at [phone] ty

## 2020-03-16 PROCEDURE — 88342 IMHCHEM/IMCYTCHM 1ST ANTB: CPT | Performed by: PATHOLOGY

## 2020-03-16 PROCEDURE — 88341 IMHCHEM/IMCYTCHM EA ADD ANTB: CPT | Performed by: PATHOLOGY

## 2020-03-16 PROCEDURE — 88305 TISSUE EXAM BY PATHOLOGIST: CPT | Performed by: PATHOLOGY

## 2020-03-17 ENCOUNTER — PATIENT OUTREACH (OUTPATIENT)
Dept: HEMATOLOGY ONCOLOGY | Facility: CLINIC | Age: 50
End: 2020-03-17

## 2020-03-17 ENCOUNTER — LAB REQUISITION (OUTPATIENT)
Dept: LAB | Facility: HOSPITAL | Age: 50
End: 2020-03-17
Payer: COMMERCIAL

## 2020-03-17 ENCOUNTER — TELEPHONE (OUTPATIENT)
Dept: GASTROENTEROLOGY | Facility: CLINIC | Age: 50
End: 2020-03-17

## 2020-03-17 ENCOUNTER — APPOINTMENT (OUTPATIENT)
Dept: LAB | Facility: CLINIC | Age: 50
End: 2020-03-17
Payer: COMMERCIAL

## 2020-03-17 ENCOUNTER — TELEPHONE (OUTPATIENT)
Dept: SURGICAL ONCOLOGY | Facility: CLINIC | Age: 50
End: 2020-03-17

## 2020-03-17 ENCOUNTER — DOCUMENTATION (OUTPATIENT)
Dept: RADIATION ONCOLOGY | Facility: HOSPITAL | Age: 50
End: 2020-03-17

## 2020-03-17 DIAGNOSIS — C20 MALIGNANT NEOPLASM OF RECTUM (HCC): ICD-10-CM

## 2020-03-17 DIAGNOSIS — C20 RECTAL CANCER (HCC): Primary | ICD-10-CM

## 2020-03-17 DIAGNOSIS — C20 RECTAL CANCER (HCC): ICD-10-CM

## 2020-03-17 LAB
ANION GAP SERPL CALCULATED.3IONS-SCNC: 7 MMOL/L (ref 4–13)
BUN SERPL-MCNC: 11 MG/DL (ref 5–25)
CALCIUM SERPL-MCNC: 9.6 MG/DL (ref 8.3–10.1)
CHLORIDE SERPL-SCNC: 106 MMOL/L (ref 100–108)
CO2 SERPL-SCNC: 28 MMOL/L (ref 21–32)
CREAT SERPL-MCNC: 0.61 MG/DL (ref 0.6–1.3)
GFR SERPL CREATININE-BSD FRML MDRD: 107 ML/MIN/1.73SQ M
GLUCOSE SERPL-MCNC: 89 MG/DL (ref 65–140)
POTASSIUM SERPL-SCNC: 3.5 MMOL/L (ref 3.5–5.3)
SODIUM SERPL-SCNC: 141 MMOL/L (ref 136–145)

## 2020-03-17 PROCEDURE — 36415 COLL VENOUS BLD VENIPUNCTURE: CPT

## 2020-03-17 PROCEDURE — 80048 BASIC METABOLIC PNL TOTAL CA: CPT

## 2020-03-17 NOTE — PROGRESS NOTES
Discussed the pt with Jerral Kocher, Chief Nurse Navigator  Pt has a new colorectal cancer after having an initial primary breast cancer approx  9 years ago  Reviewed Epic notes  Att pc to the pt  There was no answer so I left a message introducing myself and my role  Provided my contact information in my message and requested pt return my call when it is convenient for her

## 2020-03-17 NOTE — TELEPHONE ENCOUNTER
Spoke with patient and gave her number for central scheduling to be able to arrange her CT scan  I also gave her to the number to Dr Chaim Burrell office

## 2020-03-17 NOTE — TELEPHONE ENCOUNTER
Called pt to find out appt day and location    Will have CT this Friday at [address] in [place]    Will call for prior auth

## 2020-03-17 NOTE — TELEPHONE ENCOUNTER
Patient was found to have a rectal cancer 11 cm from the anal verge  Based on this finding, the patient will be sent for a CT scan of the abdomen and pelvis with and without IV contrast   The patient will require a blood urea nitrogen and creatinine prior to the CT scan  The patient will be referred to Dr Meet Ramesh for initial evaluation

## 2020-03-17 NOTE — TELEPHONE ENCOUNTER
No auth needed for the Ct Scans or the MRI   Blair Kemp Called [facility] and was advised to [employer] who stated no Derald Pipes is needed for any of the imaging   Call reference number is [id number] Spoke to [facility] is in network

## 2020-03-17 NOTE — TELEPHONE ENCOUNTER
Pt states that we need to obtain a prior auth before she can get her CT done  Please call [phone]   Ty

## 2020-03-17 NOTE — PROGRESS NOTES
Spoke to Figueroa Barnett, changed up some of her appointments per Dr Renae Mohan, reviewed them with her, we also talked about someone being with her at the appointments when she expressed to me that she has no one around here and would be at the appointments by herself, she has no family in the area, is not  and has no children, call made to Rena Mata, cancer counselor and asked him to please assist with her as she may need support and he was more than happy to help

## 2020-03-17 NOTE — TELEPHONE ENCOUNTER
New Patient Encounter    New Patient Intake Form   Patient Details:  Joss Armstrong  1970  [id number]    Background Information:  [address] starts by opening a telephone encounter and gathering the following information   Who is calling to schedule? If not self, relationship to patient? self   Referring Provider Dr Triston Jernigan   What is the diagnosis? Rectal cancer    Is this diagnosis confirmed? NA   When was the diagnosis? 3/17   Is there a confirmed diagnosis from a biopsy/tissue reviewed by pathology? Waiting for biopsy to return   Is patient aware of diagnosis? Yes   Is there a personal history of cancer and what kind? Breast cancer 9 years ago    Is there a family history of cancer and what kind? Rectal and colon cancer    Reason for visit? New Diagnosis   Have you had any imaging or labs done? If so: when, where? Yes  CT scan scheduled for 3/20   Are records in EPIC? yes   Was the patient told to bring a disk? no   Does the patient smoke or Vape? no   If yes, how many packs or cartridges per day? na   Scheduling Information:   Preferred [place]: [facility]     Requesting Specific Provider? Dr Cornelio Balderas   Are there any dates/time the patient cannot be seen? na   Miscellaneous: Prefers first thing in the morning  [phone]   After completing the above information, please route to Financial Counselor and the appropriate Nurse Navigator for review

## 2020-03-17 NOTE — PROGRESS NOTES
Called David Navarrete, introduced myself, explained my role and provided my contact information, requested some imaging be ordered by Dr Saeid Myers and labs as well which he so kindly ordered, explained to David Navarrete I set up an appointment for her with the Colorectal surgeon Dr Yulissa Lagos at the [address] [place] [address] 3/24/20 11:15, explained that he specializes in cancers that reside in the colon and rectum specifically, she was agreeable, she asked what the treatment looked like for this type of cancer as she has a history of breast cancer, we reviewed what the treatment plan would be, also made her aware that it would be based on the results of her completed work up, she verbalized understanding, we discussed chemotherapy along with concurrent chemo/RT and surgery, she asked if she would lose her hair, explained that was very unlikely with this treatment, she asked if it was IV or pill form, told her it comes in both although the pill form can have more toxicities, but this can be addressed in more detail with the Oncologist, she said with her breast cancer she saw Dr Myrna Quevedo and asked if he could continue to see her for this as well, scheduled an appointment for her with him on same day as Dr Yulissa Lagos, called her back and let her know and she was very appreciative, answered all of her questions, instructed her to call with any other questions or concerns

## 2020-03-17 NOTE — TELEPHONE ENCOUNTER
Dr Michael Bojorquez - Patient called Doctor gave patient instructions to have CT Scan and to seee Dr Romy Portillo  - Needs to speak with nurse also, not feeling well after procedure   Please call [facility] at [phone] ty

## 2020-03-18 ENCOUNTER — APPOINTMENT (OUTPATIENT)
Dept: LAB | Facility: CLINIC | Age: 50
End: 2020-03-18
Payer: COMMERCIAL

## 2020-03-18 DIAGNOSIS — C20 RECTAL CANCER (HCC): ICD-10-CM

## 2020-03-18 LAB
ALBUMIN SERPL BCP-MCNC: 3.6 G/DL (ref 3.5–5)
ALP SERPL-CCNC: 70 U/L (ref 46–116)
ALT SERPL W P-5'-P-CCNC: 18 U/L (ref 12–78)
ANION GAP SERPL CALCULATED.3IONS-SCNC: 3 MMOL/L (ref 4–13)
AST SERPL W P-5'-P-CCNC: 15 U/L (ref 5–45)
BASOPHILS # BLD AUTO: 0.02 THOUSANDS/ΜL (ref 0–0.1)
BASOPHILS NFR BLD AUTO: 0 % (ref 0–1)
BILIRUB SERPL-MCNC: 0.67 MG/DL (ref 0.2–1)
BUN SERPL-MCNC: 10 MG/DL (ref 5–25)
CALCIUM SERPL-MCNC: 8.7 MG/DL (ref 8.3–10.1)
CEA SERPL-MCNC: <0.5 NG/ML (ref 0–3)
CHLORIDE SERPL-SCNC: 107 MMOL/L (ref 100–108)
CO2 SERPL-SCNC: 30 MMOL/L (ref 21–32)
CREAT SERPL-MCNC: 0.63 MG/DL (ref 0.6–1.3)
EOSINOPHIL # BLD AUTO: 0.06 THOUSAND/ΜL (ref 0–0.61)
EOSINOPHIL NFR BLD AUTO: 1 % (ref 0–6)
ERYTHROCYTE [DISTWIDTH] IN BLOOD BY AUTOMATED COUNT: 12.6 % (ref 11.6–15.1)
GFR SERPL CREATININE-BSD FRML MDRD: 106 ML/MIN/1.73SQ M
GLUCOSE P FAST SERPL-MCNC: 88 MG/DL (ref 65–99)
HCT VFR BLD AUTO: 39.6 % (ref 34.8–46.1)
HGB BLD-MCNC: 12.7 G/DL (ref 11.5–15.4)
IMM GRANULOCYTES # BLD AUTO: 0.01 THOUSAND/UL (ref 0–0.2)
IMM GRANULOCYTES NFR BLD AUTO: 0 % (ref 0–2)
LYMPHOCYTES # BLD AUTO: 1.49 THOUSANDS/ΜL (ref 0.6–4.47)
LYMPHOCYTES NFR BLD AUTO: 29 % (ref 14–44)
MCH RBC QN AUTO: 30.4 PG (ref 26.8–34.3)
MCHC RBC AUTO-ENTMCNC: 32.1 G/DL (ref 31.4–37.4)
MCV RBC AUTO: 95 FL (ref 82–98)
MONOCYTES # BLD AUTO: 0.4 THOUSAND/ΜL (ref 0.17–1.22)
MONOCYTES NFR BLD AUTO: 8 % (ref 4–12)
NEUTROPHILS # BLD AUTO: 3.11 THOUSANDS/ΜL (ref 1.85–7.62)
NEUTS SEG NFR BLD AUTO: 62 % (ref 43–75)
NRBC BLD AUTO-RTO: 0 /100 WBCS
PLATELET # BLD AUTO: 275 THOUSANDS/UL (ref 149–390)
PMV BLD AUTO: 10.5 FL (ref 8.9–12.7)
POTASSIUM SERPL-SCNC: 3.6 MMOL/L (ref 3.5–5.3)
PROT SERPL-MCNC: 7 G/DL (ref 6.4–8.2)
RBC # BLD AUTO: 4.18 MILLION/UL (ref 3.81–5.12)
SODIUM SERPL-SCNC: 140 MMOL/L (ref 136–145)
WBC # BLD AUTO: 5.09 THOUSAND/UL (ref 4.31–10.16)

## 2020-03-18 PROCEDURE — 80053 COMPREHEN METABOLIC PANEL: CPT

## 2020-03-18 PROCEDURE — 82378 CARCINOEMBRYONIC ANTIGEN: CPT

## 2020-03-18 PROCEDURE — 36415 COLL VENOUS BLD VENIPUNCTURE: CPT

## 2020-03-18 PROCEDURE — 85025 COMPLETE CBC W/AUTO DIFF WBC: CPT

## 2020-03-20 ENCOUNTER — TELEPHONE (OUTPATIENT)
Dept: GASTROENTEROLOGY | Facility: CLINIC | Age: 50
End: 2020-03-20

## 2020-03-20 ENCOUNTER — TRANSCRIBE ORDERS (OUTPATIENT)
Dept: ADMINISTRATIVE | Facility: HOSPITAL | Age: 50
End: 2020-03-20

## 2020-03-20 ENCOUNTER — HOSPITAL ENCOUNTER (OUTPATIENT)
Dept: RADIOLOGY | Facility: HOSPITAL | Age: 50
Discharge: HOME | End: 2020-03-20
Payer: COMMERCIAL

## 2020-03-20 DIAGNOSIS — C20 RECTAL CANCER (HCC): Primary | ICD-10-CM

## 2020-03-20 DIAGNOSIS — C20 RECTAL CANCER (HCC): ICD-10-CM

## 2020-03-20 PROCEDURE — 74177 CT ABD & PELVIS W/CONTRAST: CPT

## 2020-03-20 PROCEDURE — 71260 CT THORAX DX C+: CPT

## 2020-03-20 RX ADMIN — IOHEXOL 100 ML: 350 INJECTION, SOLUTION INTRAVENOUS at 12:33

## 2020-03-23 ENCOUNTER — TELEPHONE (OUTPATIENT)
Dept: HEMATOLOGY ONCOLOGY | Facility: CLINIC | Age: 50
End: 2020-03-23

## 2020-03-23 ENCOUNTER — TELEPHONE (OUTPATIENT)
Dept: HEMATOLOGY ONCOLOGY | Facility: MEDICAL CENTER | Age: 50
End: 2020-03-23

## 2020-03-23 NOTE — TELEPHONE ENCOUNTER
I called the patient and left a message to call the office back to go over COVID-19 Screening questions

## 2020-03-24 ENCOUNTER — CONSULT (OUTPATIENT)
Dept: HEMATOLOGY ONCOLOGY | Facility: CLINIC | Age: 50
End: 2020-03-24
Payer: COMMERCIAL

## 2020-03-24 VITALS
HEIGHT: 60 IN | DIASTOLIC BLOOD PRESSURE: 74 MMHG | WEIGHT: 125.5 LBS | BODY MASS INDEX: 24.64 KG/M2 | SYSTOLIC BLOOD PRESSURE: 128 MMHG | RESPIRATION RATE: 16 BRPM | TEMPERATURE: 97.7 F | HEART RATE: 88 BPM

## 2020-03-24 DIAGNOSIS — C20 RECTAL CANCER (HCC): ICD-10-CM

## 2020-03-24 DIAGNOSIS — Z85.3 HISTORY OF RIGHT BREAST CANCER: Primary | ICD-10-CM

## 2020-03-24 PROCEDURE — 99214 OFFICE O/P EST MOD 30 MIN: CPT | Performed by: INTERNAL MEDICINE

## 2020-03-24 NOTE — LETTER
March 24, 2020     Gayla Nair, [id number] [place] [id number]    Patient: Clint Carlin   YOB: 1970   Date of Visit: 3/24/2020       Dear Dr Kinza Lopez: Thank you for referring Clint Carlin to me for evaluation  Below are my notes for this consultation  If you have questions, please do not hesitate to call me  I look forward to following your patient along with you  Sincerely,        Domnick Kussmaul, MD        CC: Zack Baldy, MD Domnick Kussmaul, MD  3/24/2020 10:48 AM  Sign at close encounter  Hematology / Oncology Outpatient Consult Note    Clint Carlin 52 y o  female [id number]         Date:  3/24/2020    Assessment / Plan:  A 52year old premenopausal woman with locally advanced right breast cancer, ER/RI negative HER-2 3+ disease, diagnosed in July 2011  She underwent neoadjuvant chemotherapy with AC followed by paclitaxel and Herceptin resulting in complete pathological response followed by lumpectomy with axillary lymph node dissection, resulting in the CLEMENTINE  She has no evidence of recurrence of breast cancer  She has newly diagnosed rectal cancer  I spoke with Dr Andre Albright who told me that he is going to have upfront surgery  I agree with his spleen  I will see her again 3 weeks after the surgery to discuss pathology report and make further recommendation regarding possible adjuvant chemotherapy  She is in agreement with my recommendation  Subjective:     HPI:          Interval History:  A 59-year-old premenopausal woman with locally advanced right breast cancer with ER/RI negative HER-2 3+ disease diagnosed in July 2011  She underwent neoadjuvant chemotherapy with AC followed by paclitaxel and Herceptin resulting in complete pathological response  She has no evidence of recurrence of breast cancer, to date  She recently noticed some occasional rectal bleeding    She underwent 1st colonoscopy which showed a rectal mass, 11 cm from the anal verge  Biopsy showed adenocarcinoma  She presents today to discuss treatment  She feels well  She has no fever, chills or night sweats  She has no weight loss  She denied any abdominal pain  She has no respiratory symptoms  Her performance status is normal   She does not have past medical history  Therefore, she does not take any medications  Her family history of colorectal cancer was only with paternal grandfather above age of 61  She is a lifetime never smoker  Her performance status is normal       Objective:     Primary Diagnosis:     1  Locally advanced right breast cancer, ER/AK negative, HER-2 (3+) disease diagnosed in July 2011      2  BRCA mutation negative  3  Rectal cancer, diagnosed in March 2020  Cancer Staging:  Cancer Staging  No matching staging information was found for the patient  Previous Hematologic/ Oncologic Treatment:     1  Neoadjuvant chemotherapy with dose-dense AC x4 followed by weekly paclitaxel and Herceptin x12    2  Adjuvant Herceptin 6 mg/kg x13 cycles, completed in December 2012  Current Hematologic/ Oncologic Treatment:      Surgical resection of rectal cancer  Disease Status:     No evidence of breast cancer  Test Results:    Pathology:    Rectal biopsy showed adenocarcinoma, 11 cm from anal verge  Radiology:    CT scan of chest abdomen pelvis showed no evidence of metastatic disease  Laboratory:    CEA less than 0 5  Physical Exam:      General Appearance:    Alert, oriented        Eyes:    PERRL   Ears:    Normal external ear canals, both ears   Nose:   Nares normal, septum midline   Throat:   Mucosa moist  Pharynx without injection      Neck:   Supple       Lungs:     Clear to auscultation bilaterally   Chest Wall:    No tenderness or deformity    Heart:    Regular rate and rhythm       Abdomen:     Soft, non-tender, bowel sounds +, no organomegaly           Extremities:   Extremities no cyanosis or edema       Skin: no rash or icterus  Lymph nodes:   Cervical, supraclavicular, and axillary nodes normal   Neurologic:   CNII-XII intact, normal strength, sensation and reflexes     Throughout          Breast exam:   NA         ROS: Review of Systems   Gastrointestinal:        Occasional rectal bleeding   All other systems reviewed and are negative  Imaging: Ct Chest Abdomen Pelvis W Contrast    Result Date: 3/20/2020  Narrative: CT CHEST, ABDOMEN AND PELVIS WITH IV CONTRAST INDICATION:   C20: Malignant neoplasm of rectum  COMPARISON:  11/2/2014 TECHNIQUE: CT examination of the chest, abdomen and pelvis was performed  Axial, sagittal, and coronal 2D reformatted images were created from the source data and submitted for interpretation  Radiation dose length product (DLP) for this visit:  318 mGy-cm   This examination, like all CT scans performed in the [facility], was performed utilizing techniques to minimize radiation dose exposure, including the use of iterative reconstruction and automated exposure control  IV Contrast:  100 mL of iohexol (OMNIPAQUE) Enteric Contrast: Enteric contrast was administered  FINDINGS: CHEST LUNGS:  There is irregular density in the right apex and subpleural density along the lateral right upper lobe which likely represents changes related to radiation for right-sided breast cancer  There is no tracheal or endobronchial lesion  PLEURA:  Unremarkable  HEART/GREAT VESSELS:  Unremarkable for patient's age  MEDIASTINUM AND JEFFREY:  Unremarkable  CHEST WALL AND LOWER NECK: Incidental discovery of one or more thyroid nodule(s) measuring less than 1 5 cm and without suspicious features is noted in this patient who is above 28years old; according to guidelines published in the February 2015 white paper on incidental thyroid nodules in the Journal of the [employer] of Radiology [facility]), no further evaluation is recommended  ABDOMEN LIVER/BILIARY TREE:  Unremarkable  GALLBLADDER:  No calcified gallstones  No pericholecystic inflammatory change  SPLEEN:  Unremarkable  PANCREAS:  Unremarkable  ADRENAL GLANDS:  Unremarkable  KIDNEYS/URETERS:  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  No hydronephrosis  STOMACH AND BOWEL:  There is focal circumferential thickening of the proximal rectum compatible with known malignancy  APPENDIX:  No findings to suggest appendicitis  ABDOMINOPELVIC CAVITY:  No ascites or free intraperitoneal air  No lymphadenopathy  VESSELS:  Unremarkable for patient's age  PELVIS REPRODUCTIVE ORGANS:  Patient is status post hysterectomy  URINARY BLADDER:  Unremarkable  ABDOMINAL WALL/INGUINAL REGIONS:  Unremarkable  OSSEOUS STRUCTURES:  No acute fracture or destructive osseous lesion  Impression: 1  No evidence of metastatic disease in the chest, abdomen, or pelvis  Rectal cancer protocol MRI is more sensitive for local spread  2   Probable radiation change in the right upper lobe  Workstation performed: [id number]         Labs:   Lab Results   Component Value Date    WBC 5 09 03/18/2020    HGB 12 7 03/18/2020    HCT 39 6 03/18/2020    MCV 95 03/18/2020     03/18/2020     Lab Results   Component Value Date    K 3 6 03/18/2020     03/18/2020    CO2 30 03/18/2020    BUN 10 03/18/2020    CREATININE 0 63 03/18/2020    GLUF 88 03/18/2020    CALCIUM 8 7 03/18/2020    AST 15 03/18/2020    ALT 18 03/18/2020    ALKPHOS 70 03/18/2020    EGFR 106 03/18/2020         Lab Results   Component Value Date    CEA <0 5 03/18/2020         Vital Sign:    Body surface area is 1 53 meters squared      Wt Readings from Last 3 Encounters:   03/24/20 56 9 kg (125 lb 8 oz)   03/24/20 57 2 kg (126 lb)   03/03/20 55 8 kg (123 lb)        Temp Readings from Last 3 Encounters:   03/24/20 97 7 °F (36 5 °C) (Oral)   10/09/19 (!) 96 8 °F (36 °C)   09/10/19 98 °F (36 7 °C) (Temporal)        BP Readings from Last 3 Encounters:   03/24/20 128/74   03/03/20 122/82 10/09/19 111/59         Pulse Readings from Last 3 Encounters:   20 88   20 62   10/09/19 100     @LASTSAO2(3)@    Active Problems:   Patient Active Problem List   Diagnosis    History of right breast cancer    Cervical dysplasia    Leiomyoma of uterus    Lichen sclerosus et atrophicus    Contusion of right chest wall    Encounter for follow-up examination after completed treatment for malignant neoplasm    Bacterial vaginitis    Rectal cancer ([facility] [address] )       Past Medical History:   Past Medical History:   Diagnosis Date    Atrophic vaginitis     Bacterial vaginitis     BRCA1 negative     Carcinoma of right breast in female, estrogen receptor negative ([facility] [address] )     Upper outer quadrant  Resolved 2017     History of radiation therapy     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)     Malignant neoplasm metastasis to lymph nodes     Missed      Resolved 2015        Surgical History:   Past Surgical History:   Procedure Laterality Date    AXILLARY NODE DISSECTION      BREAST LUMPECTOMY Right     BREAST LUMPECTOMY Right 2012    LAPAROSCOPIC TOTAL HYSTERECTOMY      OTHER SURGICAL HISTORY      Chemotherapeutics        Family History:    Family History   Problem Relation Age of Onset    Coronary artery disease Mother     Colon polyps Mother         Sigmoid colon    Skin cancer Maternal Grandmother     Colon cancer Maternal Grandfather     Colon polyps Maternal Grandfather         Sigmoid colon     Coronary artery disease Paternal Grandfather     Diabetes Paternal Grandfather     Colon polyps Paternal Grandfather         Sigmoid colon    Colon polyps Father         Sigmoid colon       Cancer-related family history includes Colon cancer in her maternal grandfather; Skin cancer in her maternal grandmother      Social History:   Social History     Socioeconomic History    Marital status: Single     Spouse name: Not on file    Number of children: Not on file    Years of education: Not on file    Highest education level: Not on file   Occupational History    Not on file   Social Needs    Financial resource strain: Not on file    Food insecurity:     Worry: Not on file     Inability: Not on file    Transportation needs:     Medical: Not on file     Non-medical: Not on file   Tobacco Use    Smoking status: Never Smoker    Smokeless tobacco: Never Used   Substance and Sexual Activity    Alcohol use: Yes     Frequency: Monthly or less    Drug use: No    Sexual activity: Yes     Comment: hysterectomy   Lifestyle    Physical activity:     Days per week: Not on file     Minutes per session: Not on file    Stress: Not on file   Relationships    Social connections:     Talks on phone: Not on file     Gets together: Not on file     Attends [employer] service: Not on file     Active member of club or organization: Not on file     Attends meetings of clubs or organizations: Not on file     Relationship status: Not on file    Intimate partner violence:     Fear of current or ex partner: Not on file     Emotionally abused: Not on file     Physically abused: Not on file     Forced sexual activity: Not on file   Other Topics Concern    Not on file   Social History Narrative    Not on file       Current Medications:   Current Outpatient Medications   Medication Sig Dispense Refill    Calcium Carbonate-Vit D-Min (CALCIUM 1200 PO) Take by mouth      cholecalciferol (VITAMIN D3) 1,000 units tablet Take 1,000 Units by mouth daily      clindamycin (CLEOCIN) 2 % vaginal cream Insert 1 applicator into the vagina daily at bedtime 40 g 0    fluconazole (DIFLUCAN) 150 mg tablet as needed   0    Multiple Vitamin (MULTIVITAMIN) tablet Take 1 tablet by mouth daily      fluticasone (FLONASE) 50 mcg/act nasal spray 1 spray into each nostril 2 (two) times a day for 5 days 16 g 0    [START ON 4/8/2020] metroNIDAZOLE (FLAGYL) 500 mg tablet 1 PM AND 10PM 2 tablet 0    [START ON 4/8/2020] neomycin (MYCIFRADIN) 500 mg tablet 1pm and 10pm 2 tablet 0     No current facility-administered medications for this visit  Allergies:    Allergies   Allergen Reactions    Tetracyclines & Related GI Intolerance    Augmentin [Amoxicillin-Pot Clavulanate] GI Intolerance and Other (See Comments)     C-diff    Tetracycline GI Intolerance

## 2020-03-24 NOTE — PROGRESS NOTES
Hematology / Oncology Outpatient Consult Note    Kehinde Hernandes 52 y o  female [id number]         Date:  3/24/2020    Assessment / Plan:  A 52year old premenopausal woman with locally advanced right breast cancer, ER/NM negative HER-2 3+ disease, diagnosed in July 2011  She underwent neoadjuvant chemotherapy with AC followed by paclitaxel and Herceptin resulting in complete pathological response followed by lumpectomy with axillary lymph node dissection, resulting in the CLEMENTINE  She has no evidence of recurrence of breast cancer  She has newly diagnosed rectal cancer  I spoke with Dr Meera Hector who told me that he is going to have upfront surgery  I agree with his spleen  I will see her again 3 weeks after the surgery to discuss pathology report and make further recommendation regarding possible adjuvant chemotherapy  She is in agreement with my recommendation  Subjective:     HPI:          Interval History:  A 59-year-old premenopausal woman with locally advanced right breast cancer with ER/NM negative HER-2 3+ disease diagnosed in July 2011  She underwent neoadjuvant chemotherapy with AC followed by paclitaxel and Herceptin resulting in complete pathological response  She has no evidence of recurrence of breast cancer, to date  She recently noticed some occasional rectal bleeding  She underwent 1st colonoscopy which showed a rectal mass, 11 cm from the anal verge  Biopsy showed adenocarcinoma  She presents today to discuss treatment  She feels well  She has no fever, chills or night sweats  She has no weight loss  She denied any abdominal pain  She has no respiratory symptoms  Her performance status is normal   She does not have past medical history  Therefore, she does not take any medications  Her family history of colorectal cancer was only with paternal grandfather above age of 61  She is a lifetime never smoker    Her performance status is normal       Objective: Primary Diagnosis:     1  Locally advanced right breast cancer, ER/NV negative, HER-2 (3+) disease diagnosed in July 2011      2  BRCA mutation negative  3  Rectal cancer, diagnosed in March 2020  Cancer Staging:  Cancer Staging  No matching staging information was found for the patient  Previous Hematologic/ Oncologic Treatment:     1  Neoadjuvant chemotherapy with dose-dense AC x4 followed by weekly paclitaxel and Herceptin x12    2  Adjuvant Herceptin 6 mg/kg x13 cycles, completed in December 2012  Current Hematologic/ Oncologic Treatment:      Surgical resection of rectal cancer  Disease Status:     No evidence of breast cancer  Test Results:    Pathology:    Rectal biopsy showed adenocarcinoma, 11 cm from anal verge  Radiology:    CT scan of chest abdomen pelvis showed no evidence of metastatic disease  Laboratory:    CEA less than 0 5  Physical Exam:      General Appearance:    Alert, oriented        Eyes:    PERRL   Ears:    Normal external ear canals, both ears   Nose:   Nares normal, septum midline   Throat:   Mucosa moist  Pharynx without injection  Neck:   Supple       Lungs:     Clear to auscultation bilaterally   Chest Wall:    No tenderness or deformity    Heart:    Regular rate and rhythm       Abdomen:     Soft, non-tender, bowel sounds +, no organomegaly           Extremities:   Extremities no cyanosis or edema       Skin:   no rash or icterus  Lymph nodes:   Cervical, supraclavicular, and axillary nodes normal   Neurologic:   CNII-XII intact, normal strength, sensation and reflexes     Throughout          Breast exam:   NA         ROS: Review of Systems   Gastrointestinal:        Occasional rectal bleeding   All other systems reviewed and are negative  Imaging: Ct Chest Abdomen Pelvis W Contrast    Result Date: 3/20/2020  Narrative: CT CHEST, ABDOMEN AND PELVIS WITH IV CONTRAST INDICATION:   C20: Malignant neoplasm of rectum   COMPARISON:  11/2/2014 TECHNIQUE: CT examination of the chest, abdomen and pelvis was performed  Axial, sagittal, and coronal 2D reformatted images were created from the source data and submitted for interpretation  Radiation dose length product (DLP) for this visit:  318 mGy-cm   This examination, like all CT scans performed in the [facility], was performed utilizing techniques to minimize radiation dose exposure, including the use of iterative reconstruction and automated exposure control  IV Contrast:  100 mL of iohexol (OMNIPAQUE) Enteric Contrast: Enteric contrast was administered  FINDINGS: CHEST LUNGS:  There is irregular density in the right apex and subpleural density along the lateral right upper lobe which likely represents changes related to radiation for right-sided breast cancer  There is no tracheal or endobronchial lesion  PLEURA:  Unremarkable  HEART/GREAT VESSELS:  Unremarkable for patient's age  MEDIASTINUM AND JEFFREY:  Unremarkable  CHEST WALL AND LOWER NECK: Incidental discovery of one or more thyroid nodule(s) measuring less than 1 5 cm and without suspicious features is noted in this patient who is above 28years old; according to guidelines published in the February 2015 white paper on incidental thyroid nodules in the Journal of the [employer] of Radiology [facility]), no further evaluation is recommended  ABDOMEN LIVER/BILIARY TREE:  Unremarkable  GALLBLADDER:  No calcified gallstones  No pericholecystic inflammatory change  SPLEEN:  Unremarkable  PANCREAS:  Unremarkable  ADRENAL GLANDS:  Unremarkable  KIDNEYS/URETERS:  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  No hydronephrosis  STOMACH AND BOWEL:  There is focal circumferential thickening of the proximal rectum compatible with known malignancy  APPENDIX:  No findings to suggest appendicitis  ABDOMINOPELVIC CAVITY:  No ascites or free intraperitoneal air  No lymphadenopathy  VESSELS:  Unremarkable for patient's age   PELVIS REPRODUCTIVE ORGANS:  Patient is status post hysterectomy  URINARY BLADDER:  Unremarkable  ABDOMINAL WALL/INGUINAL REGIONS:  Unremarkable  OSSEOUS STRUCTURES:  No acute fracture or destructive osseous lesion  Impression: 1  No evidence of metastatic disease in the chest, abdomen, or pelvis  Rectal cancer protocol MRI is more sensitive for local spread  2   Probable radiation change in the right upper lobe  Workstation performed: [id number]         Labs:   Lab Results   Component Value Date    WBC 5 09 03/18/2020    HGB 12 7 03/18/2020    HCT 39 6 03/18/2020    MCV 95 03/18/2020     03/18/2020     Lab Results   Component Value Date    K 3 6 03/18/2020     03/18/2020    CO2 30 03/18/2020    BUN 10 03/18/2020    CREATININE 0 63 03/18/2020    GLUF 88 03/18/2020    CALCIUM 8 7 03/18/2020    AST 15 03/18/2020    ALT 18 03/18/2020    ALKPHOS 70 03/18/2020    EGFR 106 03/18/2020         Lab Results   Component Value Date    CEA <0 5 03/18/2020         Vital Sign:    Body surface area is 1 53 meters squared      Wt Readings from Last 3 Encounters:   03/24/20 56 9 kg (125 lb 8 oz)   03/24/20 57 2 kg (126 lb)   03/03/20 55 8 kg (123 lb)        Temp Readings from Last 3 Encounters:   03/24/20 97 7 °F (36 5 °C) (Oral)   10/09/19 (!) 96 8 °F (36 °C)   09/10/19 98 °F (36 7 °C) (Temporal)        BP Readings from Last 3 Encounters:   03/24/20 128/74   03/03/20 122/82   10/09/19 111/59         Pulse Readings from Last 3 Encounters:   03/24/20 88   03/03/20 62   10/09/19 100     @LASTSAO2(3)@    Active Problems:   Patient Active Problem List   Diagnosis    History of right breast cancer    Cervical dysplasia    Leiomyoma of uterus    Lichen sclerosus et atrophicus    Contusion of right chest wall    Encounter for follow-up examination after completed treatment for malignant neoplasm    Bacterial vaginitis    Rectal cancer [facility])       Past Medical History:   Past Medical History:   Diagnosis Date    Atrophic vaginitis     Bacterial vaginitis     BRCA1 negative     Carcinoma of right breast in female, estrogen receptor negative (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)     Malignant neoplasm metastasis to lymph nodes     Missed      Resolved 2015        Surgical History:   Past Surgical History:   Procedure Laterality Date    AXILLARY NODE DISSECTION      BREAST LUMPECTOMY Right     BREAST LUMPECTOMY Right 2012    LAPAROSCOPIC TOTAL HYSTERECTOMY      OTHER SURGICAL HISTORY      Chemotherapeutics        Family History:    Family History   Problem Relation Age of Onset    Coronary artery disease Mother     Colon polyps Mother         Sigmoid colon    Skin cancer Maternal Grandmother     Colon cancer Maternal Grandfather     Colon polyps Maternal Grandfather         Sigmoid colon     Coronary artery disease Paternal Grandfather     Diabetes Paternal Grandfather     Colon polyps Paternal Grandfather         Sigmoid colon    Colon polyps Father         Sigmoid colon       Cancer-related family history includes Colon cancer in her maternal grandfather; Skin cancer in her maternal grandmother      Social History:   Social History     Socioeconomic History    Marital status: Single     Spouse name: Not on file    Number of children: Not on file    Years of education: Not on file    Highest education level: Not on file   Occupational History    Not on file   Social Needs    Financial resource strain: Not on file    Food insecurity:     Worry: Not on file     Inability: Not on file    Transportation needs:     Medical: Not on file     Non-medical: Not on file   Tobacco Use    Smoking status: Never Smoker    Smokeless tobacco: Never Used   Substance and Sexual Activity    Alcohol use: Yes     Frequency: Monthly or less    Drug use: No    Sexual activity: Yes     Comment: hysterectomy   Lifestyle  Physical activity:     Days per week: Not on file     Minutes per session: Not on file    Stress: Not on file   Relationships    Social connections:     Talks on phone: Not on file     Gets together: Not on file     Attends Voodoo service: Not on file     Active member of club or organization: Not on file     Attends meetings of clubs or organizations: Not on file     Relationship status: Not on file    Intimate partner violence:     Fear of current or ex partner: Not on file     Emotionally abused: Not on file     Physically abused: Not on file     Forced sexual activity: Not on file   Other Topics Concern    Not on file   Social History Narrative    Not on file       Current Medications:   Current Outpatient Medications   Medication Sig Dispense Refill    Calcium Carbonate-Vit D-Min (CALCIUM 1200 PO) Take by mouth      cholecalciferol (VITAMIN D3) 1,000 units tablet Take 1,000 Units by mouth daily      clindamycin (CLEOCIN) 2 % vaginal cream Insert 1 applicator into the vagina daily at bedtime 40 g 0    fluconazole (DIFLUCAN) 150 mg tablet as needed   0    Multiple Vitamin (MULTIVITAMIN) tablet Take 1 tablet by mouth daily      fluticasone (FLONASE) 50 mcg/act nasal spray 1 spray into each nostril 2 (two) times a day for 5 days 16 g 0    [START ON 4/8/2020] metroNIDAZOLE (FLAGYL) 500 mg tablet 1 PM AND 10PM 2 tablet 0    [START ON 4/8/2020] neomycin (MYCIFRADIN) 500 mg tablet 1pm and 10pm 2 tablet 0     No current facility-administered medications for this visit  Allergies:    Allergies   Allergen Reactions    Tetracyclines & Related GI Intolerance    Augmentin [Amoxicillin-Pot Clavulanate] GI Intolerance and Other (See Comments)     C-diff    Tetracycline GI Intolerance

## 2020-03-25 ENCOUNTER — TELEPHONE (OUTPATIENT)
Dept: HEMATOLOGY ONCOLOGY | Facility: CLINIC | Age: 50
End: 2020-03-25

## 2020-03-25 ENCOUNTER — PATIENT OUTREACH (OUTPATIENT)
Dept: HEMATOLOGY ONCOLOGY | Facility: CLINIC | Age: 50
End: 2020-03-25

## 2020-03-25 NOTE — TELEPHONE ENCOUNTER
Genetics New Patient Intake Form   Patient Details:     Jagdeep Rachel    1970    [id number]    Background Information:    "On Hold (Urgent Slot)" is set aside for Pancreatic, Ovarian, and Scheduled Surgery Patients  If it is not scheduled by the previous Friday, any patient can be scheduled in it  Who is calling to schedule? If not self, relationship to patient? self   Referring Provider Veverly Shivam   Is this a personal or family history? personal and family   If personal history, what age were you at diagnosis? Breast at 41  Rectal 49   What is the type of tumor? Rectal and breast    Pancreatic and Ovarian needs to be scheduled in the "On Hold (Urgent Slot)"   Do you have a pending surgery for your cancer diagnosis? Yes    If yes: needs to be scheduled in the "On Hold (Urgent Slot)"   Referring Provider Department colorectal   Did the patient schedule an appointment? Yes   List Appointment Date and Provider Name  Maria A 3/30 telemed  Scheduling Information:   Preferred [place]: [facility]   Are there any dates/time the patient cannot be seen? Need early or late as possible   Miscellaneous: pt had BRCA testing at breast dx at [address] [place] [address] 8 yrs ago Was negative    Distant family hx of rectal CA  [facility] [phone]   After completing the above information, please route to Oncology Genetics

## 2020-03-25 NOTE — PROGRESS NOTES
Received a call from HOLLY, she said Dr Benjamin Ramirez wants her to have a cardiac clearance prior to surgery but she does not see a cardiologist so she wants to know if there is a test she could have done to give him the information he needs rather than going to another doctor, she also wanted to know if he will be seeing her after the MRI for results or just calling her as she would like to know the results, message sent to Dr Benjamin Ramirez, once I hear back I will call her and let her know

## 2020-03-27 ENCOUNTER — DOCUMENTATION (OUTPATIENT)
Dept: HEMATOLOGY ONCOLOGY | Facility: CLINIC | Age: 50
End: 2020-03-27

## 2020-03-27 NOTE — TELEPHONE ENCOUNTER
Called ins & spoke to PHYSICIANS [facility] call ref# [id number]  Pt has an active aetna choice 2 pos plan effective 01/01/112  plan runs on a cal year  Not cobra/no other ins  on file  Deduct $1350 met/co-ins 80/20/out of pocket $4060 met $2542  51/once the out of pocket is met then services are covered 100% & co-pays are waived  The following benefits are covered under the co-ins then out of pocket then covered 100%  Chemo & the chemo drugs/radiation/advance d& standard dx imaging/in pt hospitalizations & out pt surgery/genetic & genomic testing/ER visits/labs pt must go to an in network lab/ pcp co-pay $35/specialist co-pay $50/ rx benefits [facility]/prefered speciality pharmacy is aetna/can buy & bill  Called pt to go over the benefits but got her voicemail  left message for pt to call me back

## 2020-03-27 NOTE — PROGRESS NOTES
Pt called me back & I went over her benefits & she thanked me for the information & if she has any other questions she will call me back

## 2020-03-30 ENCOUNTER — PATIENT OUTREACH (OUTPATIENT)
Dept: HEMATOLOGY ONCOLOGY | Facility: CLINIC | Age: 50
End: 2020-03-30

## 2020-03-30 ENCOUNTER — CONSULT (OUTPATIENT)
Dept: GYNECOLOGIC ONCOLOGY | Facility: CLINIC | Age: 50
End: 2020-03-30

## 2020-03-30 DIAGNOSIS — Z85.3 HISTORY OF RIGHT BREAST CANCER: ICD-10-CM

## 2020-03-30 DIAGNOSIS — N76.0 BV (BACTERIAL VAGINOSIS): ICD-10-CM

## 2020-03-30 DIAGNOSIS — Z80.0 FAMILY HISTORY OF COLON CANCER: ICD-10-CM

## 2020-03-30 DIAGNOSIS — B96.89 BV (BACTERIAL VAGINOSIS): ICD-10-CM

## 2020-03-30 DIAGNOSIS — C20 RECTAL CANCER (HCC): Primary | ICD-10-CM

## 2020-03-30 PROCEDURE — NC001 PR NO CHARGE: Performed by: GENETIC COUNSELOR, MS

## 2020-03-30 RX ORDER — CLINDAMYCIN PHOSPHATE 20 MG/G
1 CREAM VAGINAL
Qty: 40 G | Refills: 0 | Status: SHIPPED | OUTPATIENT
Start: 2020-03-30 | End: 2020-05-19

## 2020-03-30 NOTE — PROGRESS NOTES
Received a call from Rocío Espinoza, she has concerns about her pre surgical work up, she wanted clarification from Dr Nabila Boone, per him, he would like her to have a PCP clearance with a cardiac clearance (ECHO), when I spoke to Rocío Espinoza about this, she said she has not seen her PCP in approximately 10 years, she said when she had breast cancer she was following closely with her Oncologist and Surgeon that she did not get back to her PCP, referred her to PCP via [facility], she had some other things she needed addressed such as FMLA which I told her she could call Dr Edward Subramanian office as they would be handling all of that paperwork for her moving forward, spoke to [address] in Dr Edward Subramanian office, Rocío Espinoza is having difficulties establishing new patient care due to the [place] 19 pandemic and the caution offices are taking, [address] will address with Dr Nabila Boone when he comes into the office later this morning and get back to Rocío Espinoza

## 2020-03-30 NOTE — TELEPHONE ENCOUNTER
Ozzy Monge called and states She has a Bacterial infection Pt   Would like refill of Clindomycin sent to [facility] in [place],

## 2020-03-30 NOTE — PROGRESS NOTES
Pre-Test Genetic Counseling Consult Note    Patient Name: Maximiliano VIVAR/Age: 1970/49 y o  Referring Provider: Carina Valentine MD     Date of Service: 3/30/2020  Genetic Counselor: Carrie Davis [facility]    Interpretation Services: None    Service: Type: Telephone consult   Length of Visit: 61 minutes      [facility] was referred to the [address] [place] [address] and Genetic Assessment Program due to her personal history of breast and colon cancer, and her family history of colorectal cancer  Cancer and Treatment History:     Age 52 Rectal Cancer     C20 (ICD-10-CM) - Rectal cancer ([facility] [address] )     3 16 20 Rectal Cancer   A  Rectal mass at 11 cm (biopsy):     - Adenocarcinoma  - Mismatch repair protein panel pending  Comment:  The history of breast cancer is noted  The rectal tumor is most compatible with a primary colorectal carcinoma  RESULTS OF IMMUNOHISTOCHEMICAL ANALYSIS FOR MISMATCH REPAIR PROTEIN LOSS     INTERPRETATION: NO LOSS OF NUCLEAR EXPRESSION OF MMR PROTEINS: LOW PROBABILITY OF MSI-H      RESULTS:  Antibody            Clone                 Description                                  Results  MLH1                 M1                     Mismatch repair protein              Intact nuclear expression  MSH2                [id number]        Mismatch repair protein              Intact nuclear expression  MSH6                40                      Mismatch repair protein              Intact nuclear expression  PMS2                [id number]          Mismatch repair protein              Intact nuclear expression    2011 Age 39 Breast cancer     Z85 3- Right Breast Cancer    Carcinoma of right breast in female, ER/MD Negative; HER2 (3+);  Upper outer quadrant; tx with lumpectomy, chemo and radiation     Cancer Screening:     Breast  Self-breast exams: Frequently    Clinical breast exam: Annual   Mammogram: Annual most recent 20; Negative   Breast ultrasound: None   Breast MRI: None   Breast biopsy: Once at time of breast cancer diagnosis     Gynecologic  Pelvic exam: Annual     Pap smear: N/A  Trans-vaginal ultrasound:N/A  CA-125 level: N/A  Ovaries and uterus: Age 39 RADHA due to large uterine fibroid: ovaries intact     Skin   Skin cancer screening: Annual by a dermatologist      Reproductive History  Age at menarche: 13y  Parity:   Age at first live birth: NA  : NA  Oral Contraception: <6 months   Fertility Medication: None   Menopause: Periods stopped at age 39  Hormone replacement: None     Medical and Surgical History  Pertinent surgical history:   Past Surgical History:   Procedure Laterality Date    AXILLARY NODE DISSECTION      BREAST LUMPECTOMY Right     BREAST LUMPECTOMY Right 2012    LAPAROSCOPIC TOTAL HYSTERECTOMY      OTHER SURGICAL HISTORY      Chemotherapeutics       Pertinent medical history:  Past Medical History:   Diagnosis Date    Atrophic vaginitis     Bacterial vaginitis     BRCA1 negative     Carcinoma of right breast in female, estrogen receptor negative (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)     Malignant neoplasm metastasis to lymph nodes     Missed      Resolved 2015        Other History:  Height:   Ht Readings from Last 1 Encounters:   20 5' (1 524 m)     Weight:   Wt Readings from Last 1 Encounters:   20 56 9 kg (125 lb 8 oz)      Relevant Family History:    Please refer to the scanned pedigree in the Media Tab for a full pedigree    Maternal History:  -Mother: Age 68 with no history of cancer but a history of colon polyps; the type and number are not known  -Maternal Uncle: Age [de-identified] with a history of colon polyps but the type and number are not known; he had a portion of his colon/rectum removed but Alton Jackson is unsure if this was related to colon cancer and/or polyps   -Maternal Grandmother:  age 80 with a history of non-melanoma skin cancer  -Maternal Grandfather:  age 80 with no history of cancer but a history of colon polyps; the type and number are not known   -Maternal Great Aunt (sister to maternal grandfather):  with rectal cancer    Paternal History:  Father: Age 76 with a history of melanoma on his finger at age 76; he has a history of colon polyps but the type and number are not known; he had a portion of his colon removed due to diverticulitis  Paternal Aunt: Age 58 with colon polyps; the type and number are no known; she has ulcerative colitis  Paternal Grandfather:  age 66's with a history of colon cancer in his 63's    *All history is reported as provided by the patient; records are not available for review, except where indicated  Assessment:    Gilberto Coats reportedly underwent genetic testing 8 years ago and was negative for BRCA mutations  I did not have a copy of her genetic test result at the time of our appointment for review  Meets NCCN Genetic/Familial High-Risk Assessment: Colorectal V3 2019 testing criteria for the evaluation of Matson Syndrome   Personal history of colorectal, endometrial, or other Matson syndrome-associated cancer  o An individual with colorectal or endometrial cancer and any of the following:  - Diagnosed ? 48 y    Gilberto Coats meets NCCN testing criteria for the evaluation of Matson syndrome since she was diagnosed with colon cancer under the age of 48  April's immunohistochemistry staining (IHC) for expression of the HNPCC-associated mismatch repair genes including MLH1, MSH2, MSH6 and PMS2 showed normal protein expression  Since greater than 90% of colon and endometrial tumors associated with Matson syndrome show abnormal IHC staining, this result significantly reduces the likelihood that April's personal history of colon cancer was due to a germline mutation in one of these mismatch repair genes however it does not completely rule it out    In addition, April's personal history of cancer may be the result of a pathogenic variant in another gene such as CHEK2 which is associated with both breast and colon cancer  Genetic testing for hereditary cancer is available via single gene analysis or panel testing of multiple genes associated with an increased risk of cancer  The risks, benefits, and limitations of genetic testing were reviewed with the patient, as well as genetic discrimination laws, and possible test results (positive, negative, variants of uncertain significance) and their clinical implications  Tracee Handing decided to proceed with testing and provided consent  Plan    Summary: Genetic testing is indicated for Tracee Handing based on meeting NCCN testing criteria  Sample Collection: [facility] will send a saliva kit to April's home for self-collection  We reviewed the collection instructions in detail  Genetic Testing Preformed: [facility] (34 genes): APC, BENOIT, BARD1, BRCA1, BRCA2, BRIP1, BMPR1A, CDH1, CDK4, CDKN2A, CHEK2, DICER1, EPCAM, GREM1, HOXB13, MLH1, MRE11A, MSH2, MSH6, MUTYH, NBN, NF1, PALB2, PMS2, POLD1, POLE, PTEN, RAD50, RAD51C, RAD51D, SMAD4, SMARCA4, STK11, TP53    Genetic Testing Lab: [facility]    Results take approximately 2-3 weeks to complete once test is started  We will contact Tracee Handing once results are available  Additional recommendations for surveillance/medical management will be made pending genetic test results

## 2020-03-31 ENCOUNTER — HOSPITAL ENCOUNTER (OUTPATIENT)
Dept: RADIOLOGY | Facility: HOSPITAL | Age: 50
Discharge: HOME | End: 2020-03-31
Attending: COLON & RECTAL SURGERY
Payer: COMMERCIAL

## 2020-03-31 DIAGNOSIS — C20 RECTAL CANCER (HCC): ICD-10-CM

## 2020-03-31 PROCEDURE — 72195 MRI PELVIS W/O DYE: CPT

## 2020-04-06 ENCOUNTER — PATIENT OUTREACH (OUTPATIENT)
Dept: HEMATOLOGY ONCOLOGY | Facility: CLINIC | Age: 50
End: 2020-04-06

## 2020-04-07 ENCOUNTER — TELEPHONE (OUTPATIENT)
Dept: HEMATOLOGY ONCOLOGY | Facility: CLINIC | Age: 50
End: 2020-04-07

## 2020-04-08 ENCOUNTER — PATIENT OUTREACH (OUTPATIENT)
Dept: HEMATOLOGY ONCOLOGY | Facility: CLINIC | Age: 50
End: 2020-04-08

## 2020-04-09 ENCOUNTER — DOCUMENTATION (OUTPATIENT)
Dept: HEMATOLOGY ONCOLOGY | Facility: CLINIC | Age: 50
End: 2020-04-09

## 2020-04-10 ENCOUNTER — APPOINTMENT (OUTPATIENT)
Dept: LAB | Facility: CLINIC | Age: 50
End: 2020-04-10
Payer: COMMERCIAL

## 2020-04-10 ENCOUNTER — TRANSCRIBE ORDERS (OUTPATIENT)
Dept: LAB | Facility: CLINIC | Age: 50
End: 2020-04-10

## 2020-04-10 DIAGNOSIS — C20 RECTAL CANCER (HCC): Primary | ICD-10-CM

## 2020-04-10 DIAGNOSIS — C20 RECTAL CANCER (HCC): ICD-10-CM

## 2020-04-10 LAB
ABO GROUP BLD: NORMAL
BLD GP AB SCN SERPL QL: NEGATIVE
EST. AVERAGE GLUCOSE BLD GHB EST-MCNC: 103 MG/DL
HBA1C MFR BLD: 5.2 %
RH BLD: POSITIVE
SPECIMEN EXPIRATION DATE: NORMAL

## 2020-04-10 PROCEDURE — 36415 COLL VENOUS BLD VENIPUNCTURE: CPT

## 2020-04-10 PROCEDURE — 86900 BLOOD TYPING SEROLOGIC ABO: CPT

## 2020-04-10 PROCEDURE — 86901 BLOOD TYPING SEROLOGIC RH(D): CPT

## 2020-04-10 PROCEDURE — 83036 HEMOGLOBIN GLYCOSYLATED A1C: CPT

## 2020-04-10 PROCEDURE — 86850 RBC ANTIBODY SCREEN: CPT

## 2020-04-13 ENCOUNTER — TELEPHONE (OUTPATIENT)
Dept: GYNECOLOGIC ONCOLOGY | Facility: CLINIC | Age: 50
End: 2020-04-13

## 2020-04-17 ENCOUNTER — HOSPITAL ENCOUNTER (OUTPATIENT)
Dept: NON INVASIVE DIAGNOSTICS | Facility: HOSPITAL | Age: 50
Discharge: HOME | End: 2020-04-17
Attending: COLON & RECTAL SURGERY
Payer: COMMERCIAL

## 2020-04-17 DIAGNOSIS — C20 RECTAL CANCER (HCC): ICD-10-CM

## 2020-04-17 PROCEDURE — 93306 TTE W/DOPPLER COMPLETE: CPT

## 2020-04-17 PROCEDURE — 93306 TTE W/DOPPLER COMPLETE: CPT | Performed by: INTERNAL MEDICINE

## 2020-04-27 ENCOUNTER — PATIENT OUTREACH (OUTPATIENT)
Dept: HEMATOLOGY ONCOLOGY | Facility: CLINIC | Age: 50
End: 2020-04-27

## 2020-04-29 ENCOUNTER — ANESTHESIA EVENT (OUTPATIENT)
Dept: PERIOP | Facility: HOSPITAL | Age: 50
DRG: 331 | End: 2020-04-29
Payer: COMMERCIAL

## 2020-04-30 ENCOUNTER — HOSPITAL ENCOUNTER (INPATIENT)
Facility: HOSPITAL | Age: 50
LOS: 3 days | Discharge: HOME | DRG: 331 | End: 2020-05-03
Attending: COLON & RECTAL SURGERY | Admitting: COLON & RECTAL SURGERY
Payer: COMMERCIAL

## 2020-04-30 ENCOUNTER — ANESTHESIA (OUTPATIENT)
Dept: PERIOP | Facility: HOSPITAL | Age: 50
DRG: 331 | End: 2020-04-30
Payer: COMMERCIAL

## 2020-04-30 DIAGNOSIS — C20 RECTAL CANCER (HCC): ICD-10-CM

## 2020-04-30 LAB
ABO GROUP BLD: NORMAL
EXT PREGNANCY TEST URINE: NEGATIVE
EXT. CONTROL: NORMAL
RH BLD: POSITIVE

## 2020-04-30 PROCEDURE — 86901 BLOOD TYPING SEROLOGIC RH(D): CPT | Performed by: COLON & RECTAL SURGERY

## 2020-04-30 PROCEDURE — 0DBN4ZZ EXCISION OF SIGMOID COLON, PERCUTANEOUS ENDOSCOPIC APPROACH: ICD-10-PCS | Performed by: COLON & RECTAL SURGERY

## 2020-04-30 PROCEDURE — 44207 L COLECTOMY/COLOPROCTOSTOMY: CPT | Performed by: COLON & RECTAL SURGERY

## 2020-04-30 PROCEDURE — 88309 TISSUE EXAM BY PATHOLOGIST: CPT | Performed by: PATHOLOGY

## 2020-04-30 PROCEDURE — 88342 IMHCHEM/IMCYTCHM 1ST ANTB: CPT | Performed by: PATHOLOGY

## 2020-04-30 PROCEDURE — 88307 TISSUE EXAM BY PATHOLOGIST: CPT | Performed by: PATHOLOGY

## 2020-04-30 PROCEDURE — 4A1BXSH MONITORING OF GASTROINTESTINAL VASCULAR PERFUSION USING INDOCYANINE GREEN DYE, EXTERNAL APPROACH: ICD-10-PCS | Performed by: COLON & RECTAL SURGERY

## 2020-04-30 PROCEDURE — 86900 BLOOD TYPING SEROLOGIC ABO: CPT | Performed by: COLON & RECTAL SURGERY

## 2020-04-30 PROCEDURE — 88341 IMHCHEM/IMCYTCHM EA ADD ANTB: CPT | Performed by: PATHOLOGY

## 2020-04-30 PROCEDURE — 45330 DIAGNOSTIC SIGMOIDOSCOPY: CPT | Performed by: COLON & RECTAL SURGERY

## 2020-04-30 PROCEDURE — 15860 IV NJX TST VASC FLO FLAP/GRF: CPT | Performed by: COLON & RECTAL SURGERY

## 2020-04-30 PROCEDURE — C9290 INJ, BUPIVACAINE LIPOSOME: HCPCS | Performed by: STUDENT IN AN ORGANIZED HEALTH CARE EDUCATION/TRAINING PROGRAM

## 2020-04-30 PROCEDURE — 0DBP4ZZ EXCISION OF RECTUM, PERCUTANEOUS ENDOSCOPIC APPROACH: ICD-10-PCS | Performed by: COLON & RECTAL SURGERY

## 2020-04-30 PROCEDURE — 81025 URINE PREGNANCY TEST: CPT | Performed by: COLON & RECTAL SURGERY

## 2020-04-30 RX ORDER — OXYCODONE HYDROCHLORIDE 5 MG/1
5 TABLET ORAL EVERY 4 HOURS PRN
Status: DISCONTINUED | OUTPATIENT
Start: 2020-04-30 | End: 2020-05-01

## 2020-04-30 RX ORDER — ACETAMINOPHEN 325 MG/1
975 TABLET ORAL ONCE
Status: COMPLETED | OUTPATIENT
Start: 2020-04-30 | End: 2020-04-30

## 2020-04-30 RX ORDER — FENTANYL CITRATE 50 UG/ML
INJECTION, SOLUTION INTRAMUSCULAR; INTRAVENOUS AS NEEDED
Status: DISCONTINUED | OUTPATIENT
Start: 2020-04-30 | End: 2020-04-30 | Stop reason: SURG

## 2020-04-30 RX ORDER — MIDAZOLAM HYDROCHLORIDE 2 MG/2ML
INJECTION, SOLUTION INTRAMUSCULAR; INTRAVENOUS AS NEEDED
Status: DISCONTINUED | OUTPATIENT
Start: 2020-04-30 | End: 2020-04-30 | Stop reason: SURG

## 2020-04-30 RX ORDER — HYDROMORPHONE HCL/PF 1 MG/ML
0.4 SYRINGE (ML) INJECTION
Status: DISCONTINUED | OUTPATIENT
Start: 2020-04-30 | End: 2020-04-30 | Stop reason: HOSPADM

## 2020-04-30 RX ORDER — GLYCOPYRROLATE 0.2 MG/ML
INJECTION INTRAMUSCULAR; INTRAVENOUS AS NEEDED
Status: DISCONTINUED | OUTPATIENT
Start: 2020-04-30 | End: 2020-04-30 | Stop reason: SURG

## 2020-04-30 RX ORDER — CEFAZOLIN SODIUM 1 G/50ML
1000 SOLUTION INTRAVENOUS ONCE
Status: COMPLETED | OUTPATIENT
Start: 2020-04-30 | End: 2020-04-30

## 2020-04-30 RX ORDER — DEXMEDETOMIDINE HYDROCHLORIDE 100 UG/ML
INJECTION, SOLUTION INTRAVENOUS AS NEEDED
Status: DISCONTINUED | OUTPATIENT
Start: 2020-04-30 | End: 2020-04-30 | Stop reason: SURG

## 2020-04-30 RX ORDER — NEOSTIGMINE METHYLSULFATE 1 MG/ML
INJECTION INTRAVENOUS AS NEEDED
Status: DISCONTINUED | OUTPATIENT
Start: 2020-04-30 | End: 2020-04-30 | Stop reason: SURG

## 2020-04-30 RX ORDER — SODIUM CHLORIDE, SODIUM LACTATE, POTASSIUM CHLORIDE, CALCIUM CHLORIDE 600; 310; 30; 20 MG/100ML; MG/100ML; MG/100ML; MG/100ML
125 INJECTION, SOLUTION INTRAVENOUS CONTINUOUS
Status: DISCONTINUED | OUTPATIENT
Start: 2020-04-30 | End: 2020-05-01

## 2020-04-30 RX ORDER — POLYETHYLENE GLYCOL 3350 17 G/17G
255 POWDER, FOR SOLUTION ORAL ONCE
Status: DISCONTINUED | OUTPATIENT
Start: 2020-04-30 | End: 2020-05-03 | Stop reason: HOSPADM

## 2020-04-30 RX ORDER — FENTANYL CITRATE/PF 50 MCG/ML
50 SYRINGE (ML) INJECTION
Status: DISCONTINUED | OUTPATIENT
Start: 2020-04-30 | End: 2020-04-30 | Stop reason: HOSPADM

## 2020-04-30 RX ORDER — ACETAMINOPHEN 325 MG/1
650 TABLET ORAL EVERY 6 HOURS PRN
Status: DISCONTINUED | OUTPATIENT
Start: 2020-04-30 | End: 2020-05-01

## 2020-04-30 RX ORDER — MAGNESIUM HYDROXIDE 1200 MG/15ML
LIQUID ORAL AS NEEDED
Status: DISCONTINUED | OUTPATIENT
Start: 2020-04-30 | End: 2020-04-30 | Stop reason: HOSPADM

## 2020-04-30 RX ORDER — SUCCINYLCHOLINE/SOD CL,ISO/PF 100 MG/5ML
SYRINGE (ML) INTRAVENOUS AS NEEDED
Status: DISCONTINUED | OUTPATIENT
Start: 2020-04-30 | End: 2020-04-30 | Stop reason: SURG

## 2020-04-30 RX ORDER — HYDROMORPHONE HCL/PF 1 MG/ML
0.5 SYRINGE (ML) INJECTION
Status: DISCONTINUED | OUTPATIENT
Start: 2020-04-30 | End: 2020-05-03

## 2020-04-30 RX ORDER — DIPHENHYDRAMINE HYDROCHLORIDE 50 MG/ML
12.5 INJECTION INTRAMUSCULAR; INTRAVENOUS ONCE AS NEEDED
Status: DISCONTINUED | OUTPATIENT
Start: 2020-04-30 | End: 2020-04-30 | Stop reason: HOSPADM

## 2020-04-30 RX ORDER — BUPIVACAINE HYDROCHLORIDE 2.5 MG/ML
INJECTION, SOLUTION EPIDURAL; INFILTRATION; INTRACAUDAL
Status: COMPLETED | OUTPATIENT
Start: 2020-04-30 | End: 2020-04-30

## 2020-04-30 RX ORDER — DIPHENHYDRAMINE HYDROCHLORIDE 50 MG/ML
25 INJECTION INTRAMUSCULAR; INTRAVENOUS ONCE
Status: COMPLETED | OUTPATIENT
Start: 2020-04-30 | End: 2020-04-30

## 2020-04-30 RX ORDER — KETAMINE HCL IN NACL, ISO-OSM 100MG/10ML
SYRINGE (ML) INJECTION AS NEEDED
Status: DISCONTINUED | OUTPATIENT
Start: 2020-04-30 | End: 2020-04-30 | Stop reason: SURG

## 2020-04-30 RX ORDER — HEPARIN SODIUM 5000 [USP'U]/ML
5000 INJECTION, SOLUTION INTRAVENOUS; SUBCUTANEOUS ONCE
Status: COMPLETED | OUTPATIENT
Start: 2020-04-30 | End: 2020-04-30

## 2020-04-30 RX ORDER — EPHEDRINE SULFATE 50 MG/ML
INJECTION INTRAVENOUS AS NEEDED
Status: DISCONTINUED | OUTPATIENT
Start: 2020-04-30 | End: 2020-04-30 | Stop reason: SURG

## 2020-04-30 RX ORDER — PROPOFOL 10 MG/ML
INJECTION, EMULSION INTRAVENOUS AS NEEDED
Status: DISCONTINUED | OUTPATIENT
Start: 2020-04-30 | End: 2020-04-30 | Stop reason: SURG

## 2020-04-30 RX ORDER — OXYCODONE HYDROCHLORIDE 10 MG/1
10 TABLET ORAL EVERY 4 HOURS PRN
Status: DISCONTINUED | OUTPATIENT
Start: 2020-04-30 | End: 2020-05-01

## 2020-04-30 RX ORDER — ONDANSETRON 2 MG/ML
4 INJECTION INTRAMUSCULAR; INTRAVENOUS ONCE AS NEEDED
Status: DISCONTINUED | OUTPATIENT
Start: 2020-04-30 | End: 2020-04-30 | Stop reason: HOSPADM

## 2020-04-30 RX ORDER — SODIUM CHLORIDE 9 MG/ML
INJECTION, SOLUTION INTRAVENOUS CONTINUOUS PRN
Status: DISCONTINUED | OUTPATIENT
Start: 2020-04-30 | End: 2020-04-30 | Stop reason: SURG

## 2020-04-30 RX ORDER — DEXAMETHASONE SODIUM PHOSPHATE 10 MG/ML
INJECTION, SOLUTION INTRAMUSCULAR; INTRAVENOUS AS NEEDED
Status: DISCONTINUED | OUTPATIENT
Start: 2020-04-30 | End: 2020-04-30 | Stop reason: SURG

## 2020-04-30 RX ORDER — ONDANSETRON 2 MG/ML
4 INJECTION INTRAMUSCULAR; INTRAVENOUS EVERY 6 HOURS PRN
Status: DISCONTINUED | OUTPATIENT
Start: 2020-04-30 | End: 2020-05-03 | Stop reason: HOSPADM

## 2020-04-30 RX ORDER — HYDROMORPHONE HCL/PF 1 MG/ML
SYRINGE (ML) INJECTION AS NEEDED
Status: DISCONTINUED | OUTPATIENT
Start: 2020-04-30 | End: 2020-04-30 | Stop reason: SURG

## 2020-04-30 RX ORDER — HEPARIN SODIUM 5000 [USP'U]/ML
5000 INJECTION, SOLUTION INTRAVENOUS; SUBCUTANEOUS EVERY 8 HOURS SCHEDULED
Status: DISCONTINUED | OUTPATIENT
Start: 2020-04-30 | End: 2020-05-03 | Stop reason: HOSPADM

## 2020-04-30 RX ORDER — INDOCYANINE GREEN AND WATER 25 MG
KIT INJECTION AS NEEDED
Status: DISCONTINUED | OUTPATIENT
Start: 2020-04-30 | End: 2020-04-30 | Stop reason: SURG

## 2020-04-30 RX ORDER — SODIUM CHLORIDE, SODIUM LACTATE, POTASSIUM CHLORIDE, CALCIUM CHLORIDE 600; 310; 30; 20 MG/100ML; MG/100ML; MG/100ML; MG/100ML
INJECTION, SOLUTION INTRAVENOUS CONTINUOUS PRN
Status: DISCONTINUED | OUTPATIENT
Start: 2020-04-30 | End: 2020-04-30 | Stop reason: SURG

## 2020-04-30 RX ORDER — ROCURONIUM BROMIDE 10 MG/ML
INJECTION, SOLUTION INTRAVENOUS AS NEEDED
Status: DISCONTINUED | OUTPATIENT
Start: 2020-04-30 | End: 2020-04-30 | Stop reason: SURG

## 2020-04-30 RX ADMIN — HEPARIN SODIUM 5000 UNITS: 5000 INJECTION INTRAVENOUS; SUBCUTANEOUS at 08:31

## 2020-04-30 RX ADMIN — MIDAZOLAM 2 MG: 1 INJECTION INTRAMUSCULAR; INTRAVENOUS at 08:04

## 2020-04-30 RX ADMIN — ACETAMINOPHEN 650 MG: 325 TABLET ORAL at 20:28

## 2020-04-30 RX ADMIN — OXYCODONE HYDROCHLORIDE 10 MG: 10 TABLET ORAL at 15:59

## 2020-04-30 RX ADMIN — ROCURONIUM BROMIDE 20 MG: 10 INJECTION, SOLUTION INTRAVENOUS at 09:01

## 2020-04-30 RX ADMIN — INDOCYANINE GREEN AND WATER 5 MG: KIT at 10:05

## 2020-04-30 RX ADMIN — HEPARIN SODIUM 5000 UNITS: 5000 INJECTION INTRAVENOUS; SUBCUTANEOUS at 17:33

## 2020-04-30 RX ADMIN — FENTANYL CITRATE 50 MCG: 50 INJECTION, SOLUTION INTRAMUSCULAR; INTRAVENOUS at 09:53

## 2020-04-30 RX ADMIN — GLYCOPYRROLATE 0.4 MG: 0.2 INJECTION, SOLUTION INTRAMUSCULAR; INTRAVENOUS at 11:12

## 2020-04-30 RX ADMIN — SODIUM CHLORIDE, SODIUM LACTATE, POTASSIUM CHLORIDE, AND CALCIUM CHLORIDE 125 ML/HR: .6; .31; .03; .02 INJECTION, SOLUTION INTRAVENOUS at 13:08

## 2020-04-30 RX ADMIN — SODIUM CHLORIDE: 0.9 INJECTION, SOLUTION INTRAVENOUS at 10:04

## 2020-04-30 RX ADMIN — DEXMEDETOMIDINE 0.2 MCG/KG/HR: 100 INJECTION, SOLUTION, CONCENTRATE INTRAVENOUS at 08:25

## 2020-04-30 RX ADMIN — SODIUM CHLORIDE, SODIUM LACTATE, POTASSIUM CHLORIDE, AND CALCIUM CHLORIDE 125 ML/HR: .6; .31; .03; .02 INJECTION, SOLUTION INTRAVENOUS at 21:45

## 2020-04-30 RX ADMIN — BUPIVACAINE 10 ML: 13.3 INJECTION, SUSPENSION, LIPOSOMAL INFILTRATION at 11:14

## 2020-04-30 RX ADMIN — Medication 20 MG: at 08:30

## 2020-04-30 RX ADMIN — ACETAMINOPHEN 975 MG: 325 TABLET ORAL at 06:24

## 2020-04-30 RX ADMIN — SODIUM CHLORIDE, SODIUM LACTATE, POTASSIUM CHLORIDE, AND CALCIUM CHLORIDE: .6; .31; .03; .02 INJECTION, SOLUTION INTRAVENOUS at 07:50

## 2020-04-30 RX ADMIN — HYDROMORPHONE HYDROCHLORIDE 0.5 MG: 1 INJECTION, SOLUTION INTRAMUSCULAR; INTRAVENOUS; SUBCUTANEOUS at 10:35

## 2020-04-30 RX ADMIN — CEFAZOLIN SODIUM 1000 MG: 1 SOLUTION INTRAVENOUS at 08:10

## 2020-04-30 RX ADMIN — ROCURONIUM BROMIDE 50 MG: 10 INJECTION, SOLUTION INTRAVENOUS at 08:15

## 2020-04-30 RX ADMIN — PROPOFOL 200 MG: 10 INJECTION, EMULSION INTRAVENOUS at 08:10

## 2020-04-30 RX ADMIN — Medication 10 MG: at 09:52

## 2020-04-30 RX ADMIN — Medication 10 MG: at 09:37

## 2020-04-30 RX ADMIN — BUPIVACAINE 10 ML: 13.3 INJECTION, SUSPENSION, LIPOSOMAL INFILTRATION at 11:07

## 2020-04-30 RX ADMIN — FENTANYL CITRATE 100 MCG: 50 INJECTION, SOLUTION INTRAMUSCULAR; INTRAVENOUS at 08:10

## 2020-04-30 RX ADMIN — DEXAMETHASONE SODIUM PHOSPHATE 10 MG: 10 INJECTION, SOLUTION INTRAMUSCULAR; INTRAVENOUS at 08:10

## 2020-04-30 RX ADMIN — METRONIDAZOLE 500 MG: 500 INJECTION, SOLUTION INTRAVENOUS at 08:35

## 2020-04-30 RX ADMIN — ROCURONIUM BROMIDE 10 MG: 10 INJECTION, SOLUTION INTRAVENOUS at 09:25

## 2020-04-30 RX ADMIN — SODIUM CHLORIDE: 0.9 INJECTION, SOLUTION INTRAVENOUS at 08:19

## 2020-04-30 RX ADMIN — FENTANYL CITRATE 50 MCG: 50 INJECTION, SOLUTION INTRAMUSCULAR; INTRAVENOUS at 10:35

## 2020-04-30 RX ADMIN — FENTANYL CITRATE 50 MCG: 50 INJECTION INTRAMUSCULAR; INTRAVENOUS at 11:45

## 2020-04-30 RX ADMIN — Medication 100 MG: at 08:10

## 2020-04-30 RX ADMIN — DEXMEDETOMIDINE 8 MCG: 100 INJECTION, SOLUTION, CONCENTRATE INTRAVENOUS at 09:29

## 2020-04-30 RX ADMIN — DEXMEDETOMIDINE 12 MCG: 100 INJECTION, SOLUTION, CONCENTRATE INTRAVENOUS at 08:25

## 2020-04-30 RX ADMIN — EPHEDRINE SULFATE 5 MG: 50 INJECTION, SOLUTION INTRAVENOUS at 09:59

## 2020-04-30 RX ADMIN — FENTANYL CITRATE 50 MCG: 50 INJECTION INTRAMUSCULAR; INTRAVENOUS at 14:23

## 2020-04-30 RX ADMIN — Medication 10 MG: at 09:05

## 2020-04-30 RX ADMIN — PHENYLEPHRINE HYDROCHLORIDE 50 MCG/MIN: 10 INJECTION INTRAVENOUS at 08:47

## 2020-04-30 RX ADMIN — NEOSTIGMINE METHYLSULFATE 3.5 MG: 1 INJECTION, SOLUTION INTRAVENOUS at 11:12

## 2020-04-30 RX ADMIN — DIPHENHYDRAMINE HYDROCHLORIDE 25 MG: 50 INJECTION, SOLUTION INTRAMUSCULAR; INTRAVENOUS at 16:49

## 2020-04-30 RX ADMIN — SODIUM CHLORIDE, SODIUM LACTATE, POTASSIUM CHLORIDE, AND CALCIUM CHLORIDE 125 ML/HR: .6; .31; .03; .02 INJECTION, SOLUTION INTRAVENOUS at 14:24

## 2020-04-30 RX ADMIN — ROCURONIUM BROMIDE 10 MG: 10 INJECTION, SOLUTION INTRAVENOUS at 08:57

## 2020-04-30 RX ADMIN — EPHEDRINE SULFATE 10 MG: 50 INJECTION, SOLUTION INTRAVENOUS at 08:53

## 2020-04-30 RX ADMIN — BUPIVACAINE HYDROCHLORIDE 20 ML: 2.5 INJECTION, SOLUTION EPIDURAL; INFILTRATION; INTRACAUDAL at 11:07

## 2020-05-01 LAB
ANION GAP SERPL CALCULATED.3IONS-SCNC: 5 MMOL/L (ref 4–13)
BASOPHILS # BLD AUTO: 0.01 THOUSANDS/ΜL (ref 0–0.1)
BASOPHILS NFR BLD AUTO: 0 % (ref 0–1)
BUN SERPL-MCNC: 5 MG/DL (ref 5–25)
CALCIUM SERPL-MCNC: 8.4 MG/DL (ref 8.3–10.1)
CHLORIDE SERPL-SCNC: 109 MMOL/L (ref 100–108)
CO2 SERPL-SCNC: 28 MMOL/L (ref 21–32)
CREAT SERPL-MCNC: 0.56 MG/DL (ref 0.6–1.3)
EOSINOPHIL # BLD AUTO: 0 THOUSAND/ΜL (ref 0–0.61)
EOSINOPHIL NFR BLD AUTO: 0 % (ref 0–6)
ERYTHROCYTE [DISTWIDTH] IN BLOOD BY AUTOMATED COUNT: 12.9 % (ref 11.6–15.1)
GFR SERPL CREATININE-BSD FRML MDRD: 110 ML/MIN/1.73SQ M
GLUCOSE SERPL-MCNC: 88 MG/DL (ref 65–140)
HCT VFR BLD AUTO: 36.4 % (ref 34.8–46.1)
HGB BLD-MCNC: 11.3 G/DL (ref 11.5–15.4)
IMM GRANULOCYTES # BLD AUTO: 0.04 THOUSAND/UL (ref 0–0.2)
IMM GRANULOCYTES NFR BLD AUTO: 1 % (ref 0–2)
LYMPHOCYTES # BLD AUTO: 1.8 THOUSANDS/ΜL (ref 0.6–4.47)
LYMPHOCYTES NFR BLD AUTO: 26 % (ref 14–44)
MAGNESIUM SERPL-MCNC: 1.6 MG/DL (ref 1.6–2.6)
MCH RBC QN AUTO: 30.7 PG (ref 26.8–34.3)
MCHC RBC AUTO-ENTMCNC: 31 G/DL (ref 31.4–37.4)
MCV RBC AUTO: 99 FL (ref 82–98)
MONOCYTES # BLD AUTO: 0.66 THOUSAND/ΜL (ref 0.17–1.22)
MONOCYTES NFR BLD AUTO: 10 % (ref 4–12)
NEUTROPHILS # BLD AUTO: 4.42 THOUSANDS/ΜL (ref 1.85–7.62)
NEUTS SEG NFR BLD AUTO: 63 % (ref 43–75)
NRBC BLD AUTO-RTO: 0 /100 WBCS
PLATELET # BLD AUTO: 218 THOUSANDS/UL (ref 149–390)
PMV BLD AUTO: 10.9 FL (ref 8.9–12.7)
POTASSIUM SERPL-SCNC: 3.6 MMOL/L (ref 3.5–5.3)
RBC # BLD AUTO: 3.68 MILLION/UL (ref 3.81–5.12)
SODIUM SERPL-SCNC: 142 MMOL/L (ref 136–145)
WBC # BLD AUTO: 6.93 THOUSAND/UL (ref 4.31–10.16)

## 2020-05-01 PROCEDURE — 97167 OT EVAL HIGH COMPLEX 60 MIN: CPT

## 2020-05-01 PROCEDURE — 97163 PT EVAL HIGH COMPLEX 45 MIN: CPT

## 2020-05-01 PROCEDURE — 99232 SBSQ HOSP IP/OBS MODERATE 35: CPT | Performed by: ANESTHESIOLOGY

## 2020-05-01 PROCEDURE — 83735 ASSAY OF MAGNESIUM: CPT | Performed by: SURGERY

## 2020-05-01 PROCEDURE — 85025 COMPLETE CBC W/AUTO DIFF WBC: CPT | Performed by: SURGERY

## 2020-05-01 PROCEDURE — 80048 BASIC METABOLIC PNL TOTAL CA: CPT | Performed by: SURGERY

## 2020-05-01 PROCEDURE — NC001 PR NO CHARGE: Performed by: ANESTHESIOLOGY

## 2020-05-01 RX ORDER — ACETAMINOPHEN 325 MG/1
650 TABLET ORAL EVERY 6 HOURS PRN
Status: DISCONTINUED | OUTPATIENT
Start: 2020-05-01 | End: 2020-05-02

## 2020-05-01 RX ORDER — ONDANSETRON 2 MG/ML
4 INJECTION INTRAMUSCULAR; INTRAVENOUS EVERY 6 HOURS PRN
Status: DISCONTINUED | OUTPATIENT
Start: 2020-05-01 | End: 2020-05-01

## 2020-05-01 RX ORDER — DIPHENHYDRAMINE HYDROCHLORIDE 50 MG/ML
25 INJECTION INTRAMUSCULAR; INTRAVENOUS EVERY 6 HOURS PRN
Status: DISCONTINUED | OUTPATIENT
Start: 2020-05-01 | End: 2020-05-03

## 2020-05-01 RX ORDER — HYDROCODONE BITARTRATE AND ACETAMINOPHEN 5; 325 MG/1; MG/1
1 TABLET ORAL EVERY 6 HOURS PRN
Status: DISCONTINUED | OUTPATIENT
Start: 2020-05-01 | End: 2020-05-02

## 2020-05-01 RX ORDER — HYDROCODONE BITARTRATE AND ACETAMINOPHEN 5; 325 MG/1; MG/1
1 TABLET ORAL EVERY 12 HOURS PRN
Status: DISCONTINUED | OUTPATIENT
Start: 2020-05-01 | End: 2020-05-01

## 2020-05-01 RX ORDER — DEXTROSE, SODIUM CHLORIDE, AND POTASSIUM CHLORIDE 5; .45; .15 G/100ML; G/100ML; G/100ML
84 INJECTION INTRAVENOUS CONTINUOUS
Status: DISCONTINUED | OUTPATIENT
Start: 2020-05-01 | End: 2020-05-02

## 2020-05-01 RX ORDER — DIPHENHYDRAMINE HYDROCHLORIDE 50 MG/ML
25 INJECTION INTRAMUSCULAR; INTRAVENOUS ONCE
Status: COMPLETED | OUTPATIENT
Start: 2020-05-01 | End: 2020-05-01

## 2020-05-01 RX ORDER — ACETAMINOPHEN 325 MG/1
650 TABLET ORAL EVERY 6 HOURS SCHEDULED
Status: DISCONTINUED | OUTPATIENT
Start: 2020-05-01 | End: 2020-05-01

## 2020-05-01 RX ORDER — HYDROCODONE BITARTRATE AND ACETAMINOPHEN 5; 325 MG/1; MG/1
1 TABLET ORAL EVERY 12 HOURS PRN
Qty: 10 TABLET | Refills: 0 | Status: SHIPPED | OUTPATIENT
Start: 2020-05-01 | End: 2020-05-03

## 2020-05-01 RX ORDER — MAGNESIUM SULFATE HEPTAHYDRATE 40 MG/ML
2 INJECTION, SOLUTION INTRAVENOUS ONCE
Status: COMPLETED | OUTPATIENT
Start: 2020-05-01 | End: 2020-05-01

## 2020-05-01 RX ORDER — HYDROCODONE BITARTRATE AND ACETAMINOPHEN 5; 325 MG/1; MG/1
1 TABLET ORAL EVERY 6 HOURS PRN
Status: DISCONTINUED | OUTPATIENT
Start: 2020-05-01 | End: 2020-05-01

## 2020-05-01 RX ORDER — POTASSIUM CHLORIDE 20 MEQ/1
20 TABLET, EXTENDED RELEASE ORAL ONCE
Status: COMPLETED | OUTPATIENT
Start: 2020-05-01 | End: 2020-05-01

## 2020-05-01 RX ADMIN — HEPARIN SODIUM 5000 UNITS: 5000 INJECTION INTRAVENOUS; SUBCUTANEOUS at 13:05

## 2020-05-01 RX ADMIN — SODIUM CHLORIDE, SODIUM LACTATE, POTASSIUM CHLORIDE, AND CALCIUM CHLORIDE 125 ML/HR: .6; .31; .03; .02 INJECTION, SOLUTION INTRAVENOUS at 05:33

## 2020-05-01 RX ADMIN — HEPARIN SODIUM 5000 UNITS: 5000 INJECTION INTRAVENOUS; SUBCUTANEOUS at 05:30

## 2020-05-01 RX ADMIN — DIPHENHYDRAMINE HYDROCHLORIDE 25 MG: 50 INJECTION INTRAMUSCULAR; INTRAVENOUS at 06:57

## 2020-05-01 RX ADMIN — MAGNESIUM SULFATE HEPTAHYDRATE 2 G: 40 INJECTION, SOLUTION INTRAVENOUS at 09:38

## 2020-05-01 RX ADMIN — HYDROMORPHONE HYDROCHLORIDE: 10 INJECTION INTRAMUSCULAR; INTRAVENOUS; SUBCUTANEOUS at 11:49

## 2020-05-01 RX ADMIN — HEPARIN SODIUM 5000 UNITS: 5000 INJECTION INTRAVENOUS; SUBCUTANEOUS at 23:14

## 2020-05-01 RX ADMIN — HYDROMORPHONE HYDROCHLORIDE 0.5 MG: 1 INJECTION, SOLUTION INTRAMUSCULAR; INTRAVENOUS; SUBCUTANEOUS at 10:46

## 2020-05-01 RX ADMIN — ACETAMINOPHEN 650 MG: 325 TABLET ORAL at 05:29

## 2020-05-01 RX ADMIN — DEXTROSE, SODIUM CHLORIDE, AND POTASSIUM CHLORIDE 84 ML/HR: 5; .45; .15 INJECTION INTRAVENOUS at 18:34

## 2020-05-01 RX ADMIN — DEXTROSE, SODIUM CHLORIDE, AND POTASSIUM CHLORIDE 84 ML/HR: 5; .45; .15 INJECTION INTRAVENOUS at 07:15

## 2020-05-01 RX ADMIN — POTASSIUM CHLORIDE 20 MEQ: 1500 TABLET, EXTENDED RELEASE ORAL at 09:37

## 2020-05-02 LAB
ANION GAP SERPL CALCULATED.3IONS-SCNC: 4 MMOL/L (ref 4–13)
BUN SERPL-MCNC: 4 MG/DL (ref 5–25)
CALCIUM SERPL-MCNC: 8.8 MG/DL (ref 8.3–10.1)
CHLORIDE SERPL-SCNC: 108 MMOL/L (ref 100–108)
CO2 SERPL-SCNC: 28 MMOL/L (ref 21–32)
CREAT SERPL-MCNC: 0.51 MG/DL (ref 0.6–1.3)
GFR SERPL CREATININE-BSD FRML MDRD: 113 ML/MIN/1.73SQ M
GLUCOSE SERPL-MCNC: 88 MG/DL (ref 65–140)
MAGNESIUM SERPL-MCNC: 2.1 MG/DL (ref 1.6–2.6)
POTASSIUM SERPL-SCNC: 3.6 MMOL/L (ref 3.5–5.3)
SODIUM SERPL-SCNC: 140 MMOL/L (ref 136–145)

## 2020-05-02 PROCEDURE — 83735 ASSAY OF MAGNESIUM: CPT | Performed by: PHYSICIAN ASSISTANT

## 2020-05-02 PROCEDURE — 80048 BASIC METABOLIC PNL TOTAL CA: CPT | Performed by: PHYSICIAN ASSISTANT

## 2020-05-02 RX ORDER — HYDROCODONE BITARTRATE AND ACETAMINOPHEN 5; 325 MG/1; MG/1
1 TABLET ORAL EVERY 4 HOURS PRN
Status: DISCONTINUED | OUTPATIENT
Start: 2020-05-02 | End: 2020-05-03 | Stop reason: HOSPADM

## 2020-05-02 RX ORDER — HYDROCODONE BITARTRATE AND ACETAMINOPHEN 5; 325 MG/1; MG/1
2 TABLET ORAL EVERY 4 HOURS PRN
Status: DISCONTINUED | OUTPATIENT
Start: 2020-05-02 | End: 2020-05-03 | Stop reason: HOSPADM

## 2020-05-02 RX ORDER — POTASSIUM CHLORIDE 20 MEQ/1
40 TABLET, EXTENDED RELEASE ORAL ONCE
Status: COMPLETED | OUTPATIENT
Start: 2020-05-02 | End: 2020-05-02

## 2020-05-02 RX ADMIN — HYDROCODONE BITARTRATE AND ACETAMINOPHEN 1 TABLET: 5; 325 TABLET ORAL at 13:07

## 2020-05-02 RX ADMIN — HEPARIN SODIUM 5000 UNITS: 5000 INJECTION INTRAVENOUS; SUBCUTANEOUS at 13:07

## 2020-05-02 RX ADMIN — HYDROCODONE BITARTRATE AND ACETAMINOPHEN 1 TABLET: 5; 325 TABLET ORAL at 08:42

## 2020-05-02 RX ADMIN — HEPARIN SODIUM 5000 UNITS: 5000 INJECTION INTRAVENOUS; SUBCUTANEOUS at 23:21

## 2020-05-02 RX ADMIN — HEPARIN SODIUM 5000 UNITS: 5000 INJECTION INTRAVENOUS; SUBCUTANEOUS at 06:18

## 2020-05-02 RX ADMIN — HYDROCODONE BITARTRATE AND ACETAMINOPHEN 2 TABLET: 5; 325 TABLET ORAL at 04:39

## 2020-05-02 RX ADMIN — POTASSIUM CHLORIDE 40 MEQ: 1500 TABLET, EXTENDED RELEASE ORAL at 08:42

## 2020-05-02 RX ADMIN — HYDROCODONE BITARTRATE AND ACETAMINOPHEN 1 TABLET: 5; 325 TABLET ORAL at 19:52

## 2020-05-03 VITALS
TEMPERATURE: 98.2 F | WEIGHT: 123 LBS | DIASTOLIC BLOOD PRESSURE: 65 MMHG | BODY MASS INDEX: 24.15 KG/M2 | HEIGHT: 60 IN | RESPIRATION RATE: 18 BRPM | HEART RATE: 66 BPM | SYSTOLIC BLOOD PRESSURE: 116 MMHG | OXYGEN SATURATION: 96 %

## 2020-05-03 LAB
ANION GAP SERPL CALCULATED.3IONS-SCNC: 4 MMOL/L (ref 4–13)
BUN SERPL-MCNC: 6 MG/DL (ref 5–25)
CALCIUM SERPL-MCNC: 8.9 MG/DL (ref 8.3–10.1)
CHLORIDE SERPL-SCNC: 107 MMOL/L (ref 100–108)
CO2 SERPL-SCNC: 29 MMOL/L (ref 21–32)
CREAT SERPL-MCNC: 0.46 MG/DL (ref 0.6–1.3)
GFR SERPL CREATININE-BSD FRML MDRD: 117 ML/MIN/1.73SQ M
GLUCOSE SERPL-MCNC: 84 MG/DL (ref 65–140)
POTASSIUM SERPL-SCNC: 3.7 MMOL/L (ref 3.5–5.3)
SODIUM SERPL-SCNC: 140 MMOL/L (ref 136–145)

## 2020-05-03 PROCEDURE — 80048 BASIC METABOLIC PNL TOTAL CA: CPT | Performed by: STUDENT IN AN ORGANIZED HEALTH CARE EDUCATION/TRAINING PROGRAM

## 2020-05-03 RX ORDER — POTASSIUM CHLORIDE 20 MEQ/1
40 TABLET, EXTENDED RELEASE ORAL ONCE
Status: COMPLETED | OUTPATIENT
Start: 2020-05-03 | End: 2020-05-03

## 2020-05-03 RX ORDER — HYDROCODONE BITARTRATE AND ACETAMINOPHEN 5; 325 MG/1; MG/1
1 TABLET ORAL EVERY 12 HOURS PRN
Qty: 10 TABLET | Refills: 0 | Status: SHIPPED | OUTPATIENT
Start: 2020-05-03 | End: 2020-05-08

## 2020-05-03 RX ADMIN — HYDROCODONE BITARTRATE AND ACETAMINOPHEN 1 TABLET: 5; 325 TABLET ORAL at 01:52

## 2020-05-03 RX ADMIN — HEPARIN SODIUM 5000 UNITS: 5000 INJECTION INTRAVENOUS; SUBCUTANEOUS at 05:30

## 2020-05-03 RX ADMIN — POTASSIUM CHLORIDE 40 MEQ: 1500 TABLET, EXTENDED RELEASE ORAL at 09:24

## 2020-05-03 RX ADMIN — HEPARIN SODIUM 5000 UNITS: 5000 INJECTION INTRAVENOUS; SUBCUTANEOUS at 13:03

## 2020-05-06 ENCOUNTER — PATIENT OUTREACH (OUTPATIENT)
Dept: HEMATOLOGY ONCOLOGY | Facility: CLINIC | Age: 50
End: 2020-05-06

## 2020-05-06 DIAGNOSIS — C20 RECTAL CANCER (HCC): Primary | ICD-10-CM

## 2020-05-08 ENCOUNTER — PATIENT OUTREACH (OUTPATIENT)
Dept: HEMATOLOGY ONCOLOGY | Facility: CLINIC | Age: 50
End: 2020-05-08

## 2020-05-08 ENCOUNTER — TELEPHONE (OUTPATIENT)
Dept: HEMATOLOGY ONCOLOGY | Facility: CLINIC | Age: 50
End: 2020-05-08

## 2020-05-08 ENCOUNTER — DOCUMENTATION (OUTPATIENT)
Dept: HEMATOLOGY ONCOLOGY | Facility: CLINIC | Age: 50
End: 2020-05-08

## 2020-05-14 ENCOUNTER — PATIENT OUTREACH (OUTPATIENT)
Dept: HEMATOLOGY ONCOLOGY | Facility: CLINIC | Age: 50
End: 2020-05-14

## 2020-05-14 ENCOUNTER — DOCUMENTATION (OUTPATIENT)
Dept: HEMATOLOGY ONCOLOGY | Facility: CLINIC | Age: 50
End: 2020-05-14

## 2020-05-15 ENCOUNTER — PATIENT OUTREACH (OUTPATIENT)
Dept: HEMATOLOGY ONCOLOGY | Facility: CLINIC | Age: 50
End: 2020-05-15

## 2020-05-15 ENCOUNTER — TELEPHONE (OUTPATIENT)
Dept: NUTRITION | Facility: CLINIC | Age: 50
End: 2020-05-15

## 2020-05-19 ENCOUNTER — CONSULT (OUTPATIENT)
Dept: HEMATOLOGY ONCOLOGY | Facility: CLINIC | Age: 50
End: 2020-05-19
Payer: COMMERCIAL

## 2020-05-19 ENCOUNTER — PATIENT OUTREACH (OUTPATIENT)
Dept: HEMATOLOGY ONCOLOGY | Facility: CLINIC | Age: 50
End: 2020-05-19

## 2020-05-19 VITALS — BODY MASS INDEX: 24.02 KG/M2 | RESPIRATION RATE: 16 BRPM | HEIGHT: 60 IN | HEART RATE: 89 BPM | TEMPERATURE: 98.4 F

## 2020-05-19 DIAGNOSIS — C20 RECTAL CANCER (HCC): Primary | ICD-10-CM

## 2020-05-19 DIAGNOSIS — Z95.828 PORT-A-CATH IN PLACE: Primary | ICD-10-CM

## 2020-05-19 DIAGNOSIS — Z85.3 HISTORY OF RIGHT BREAST CANCER: ICD-10-CM

## 2020-05-19 PROCEDURE — 99215 OFFICE O/P EST HI 40 MIN: CPT | Performed by: INTERNAL MEDICINE

## 2020-05-19 RX ORDER — ONDANSETRON 4 MG/1
4 TABLET, FILM COATED ORAL EVERY 8 HOURS PRN
Qty: 30 TABLET | Refills: 1 | Status: SHIPPED | OUTPATIENT
Start: 2020-05-19 | End: 2020-07-01 | Stop reason: SDUPTHER

## 2020-05-19 RX ORDER — DEXTROSE MONOHYDRATE 50 MG/ML
20 INJECTION, SOLUTION INTRAVENOUS ONCE
Status: CANCELLED | OUTPATIENT
Start: 2020-06-17

## 2020-05-19 RX ORDER — FLUOROURACIL 50 MG/ML
400 INJECTION, SOLUTION INTRAVENOUS ONCE
Status: CANCELLED | OUTPATIENT
Start: 2020-06-30

## 2020-05-19 RX ORDER — CALCIUM POLYCARBOPHIL 625 MG 625 MG/1
625 TABLET ORAL DAILY
COMMUNITY
End: 2021-03-04 | Stop reason: ALTCHOICE

## 2020-05-19 RX ORDER — CEPHALEXIN 250 MG/1
CAPSULE ORAL
COMMUNITY
Start: 2020-05-14 | End: 2020-05-29

## 2020-05-19 RX ORDER — DEXTROSE MONOHYDRATE 50 MG/ML
20 INJECTION, SOLUTION INTRAVENOUS ONCE
Status: CANCELLED | OUTPATIENT
Start: 2020-06-30

## 2020-05-19 RX ORDER — SODIUM CHLORIDE 9 MG/ML
20 INJECTION, SOLUTION INTRAVENOUS ONCE AS NEEDED
Status: CANCELLED | OUTPATIENT
Start: 2020-06-17

## 2020-05-19 RX ORDER — LIDOCAINE AND PRILOCAINE 25; 25 MG/G; MG/G
CREAM TOPICAL AS NEEDED
Qty: 30 G | Refills: 0 | Status: SHIPPED | OUTPATIENT
Start: 2020-05-19 | End: 2021-03-04 | Stop reason: ALTCHOICE

## 2020-05-19 RX ORDER — SODIUM CHLORIDE 9 MG/ML
20 INJECTION, SOLUTION INTRAVENOUS ONCE AS NEEDED
Status: CANCELLED | OUTPATIENT
Start: 2020-06-30

## 2020-05-19 RX ORDER — FLUOROURACIL 50 MG/ML
400 INJECTION, SOLUTION INTRAVENOUS ONCE
Status: CANCELLED | OUTPATIENT
Start: 2020-06-03

## 2020-05-19 RX ORDER — SODIUM CHLORIDE 9 MG/ML
20 INJECTION, SOLUTION INTRAVENOUS ONCE AS NEEDED
Status: CANCELLED | OUTPATIENT
Start: 2020-06-03

## 2020-05-19 RX ORDER — FLUOROURACIL 50 MG/ML
400 INJECTION, SOLUTION INTRAVENOUS ONCE
Status: CANCELLED | OUTPATIENT
Start: 2020-06-17

## 2020-05-19 RX ORDER — DEXTROSE MONOHYDRATE 50 MG/ML
20 INJECTION, SOLUTION INTRAVENOUS ONCE
Status: CANCELLED | OUTPATIENT
Start: 2020-06-03

## 2020-05-21 ENCOUNTER — TELEPHONE (OUTPATIENT)
Dept: HEMATOLOGY ONCOLOGY | Facility: CLINIC | Age: 50
End: 2020-05-21

## 2020-05-22 ENCOUNTER — TELEPHONE (OUTPATIENT)
Dept: HEMATOLOGY ONCOLOGY | Facility: MEDICAL CENTER | Age: 50
End: 2020-05-22

## 2020-05-26 ENCOUNTER — TELEPHONE (OUTPATIENT)
Dept: RADIOLOGY | Facility: HOSPITAL | Age: 50
End: 2020-05-26

## 2020-05-26 DIAGNOSIS — C20 RECTAL CANCER (HCC): Primary | ICD-10-CM

## 2020-05-26 RX ORDER — CEFAZOLIN SODIUM 1 G/50ML
1000 SOLUTION INTRAVENOUS ONCE
Status: CANCELLED | OUTPATIENT
Start: 2020-05-26 | End: 2020-05-26

## 2020-05-26 RX ORDER — SODIUM CHLORIDE 9 MG/ML
75 INJECTION, SOLUTION INTRAVENOUS CONTINUOUS
Status: CANCELLED | OUTPATIENT
Start: 2020-05-26

## 2020-05-28 ENCOUNTER — NUTRITION (OUTPATIENT)
Dept: NUTRITION | Facility: CLINIC | Age: 50
End: 2020-05-28

## 2020-05-28 ENCOUNTER — TELEPHONE (OUTPATIENT)
Dept: SURGERY | Facility: HOSPITAL | Age: 50
End: 2020-05-28

## 2020-05-28 DIAGNOSIS — Z71.3 NUTRITIONAL COUNSELING: Primary | ICD-10-CM

## 2020-05-29 ENCOUNTER — HOSPITAL ENCOUNTER (OUTPATIENT)
Dept: RADIOLOGY | Facility: HOSPITAL | Age: 50
Discharge: HOME | End: 2020-05-29
Attending: COLON & RECTAL SURGERY
Payer: COMMERCIAL

## 2020-05-29 ENCOUNTER — APPOINTMENT (OUTPATIENT)
Dept: LAB | Facility: CLINIC | Age: 50
End: 2020-05-29
Payer: COMMERCIAL

## 2020-05-29 VITALS
HEIGHT: 60 IN | TEMPERATURE: 98.3 F | RESPIRATION RATE: 20 BRPM | SYSTOLIC BLOOD PRESSURE: 112 MMHG | HEART RATE: 73 BPM | BODY MASS INDEX: 24.15 KG/M2 | OXYGEN SATURATION: 98 % | DIASTOLIC BLOOD PRESSURE: 63 MMHG | WEIGHT: 123 LBS

## 2020-05-29 DIAGNOSIS — C20 RECTAL CANCER (HCC): ICD-10-CM

## 2020-05-29 LAB
ALBUMIN SERPL BCP-MCNC: 3.4 G/DL (ref 3.5–5)
ALP SERPL-CCNC: 74 U/L (ref 46–116)
ALT SERPL W P-5'-P-CCNC: 18 U/L (ref 12–78)
ANION GAP SERPL CALCULATED.3IONS-SCNC: 5 MMOL/L (ref 4–13)
AST SERPL W P-5'-P-CCNC: 20 U/L (ref 5–45)
BASOPHILS # BLD AUTO: 0.02 THOUSANDS/ΜL (ref 0–0.1)
BASOPHILS NFR BLD AUTO: 0 % (ref 0–1)
BILIRUB SERPL-MCNC: 0.29 MG/DL (ref 0.2–1)
BUN SERPL-MCNC: 10 MG/DL (ref 5–25)
CALCIUM SERPL-MCNC: 8.9 MG/DL (ref 8.3–10.1)
CHLORIDE SERPL-SCNC: 103 MMOL/L (ref 100–108)
CO2 SERPL-SCNC: 31 MMOL/L (ref 21–32)
CREAT SERPL-MCNC: 0.7 MG/DL (ref 0.6–1.3)
EOSINOPHIL # BLD AUTO: 0.05 THOUSAND/ΜL (ref 0–0.61)
EOSINOPHIL NFR BLD AUTO: 1 % (ref 0–6)
ERYTHROCYTE [DISTWIDTH] IN BLOOD BY AUTOMATED COUNT: 12.6 % (ref 11.6–15.1)
GFR SERPL CREATININE-BSD FRML MDRD: 102 ML/MIN/1.73SQ M
GLUCOSE SERPL-MCNC: 140 MG/DL (ref 65–140)
HCT VFR BLD AUTO: 41.3 % (ref 34.8–46.1)
HGB BLD-MCNC: 12.9 G/DL (ref 11.5–15.4)
IMM GRANULOCYTES # BLD AUTO: 0.02 THOUSAND/UL (ref 0–0.2)
IMM GRANULOCYTES NFR BLD AUTO: 0 % (ref 0–2)
LYMPHOCYTES # BLD AUTO: 1.56 THOUSANDS/ΜL (ref 0.6–4.47)
LYMPHOCYTES NFR BLD AUTO: 28 % (ref 14–44)
MCH RBC QN AUTO: 30.2 PG (ref 26.8–34.3)
MCHC RBC AUTO-ENTMCNC: 31.2 G/DL (ref 31.4–37.4)
MCV RBC AUTO: 97 FL (ref 82–98)
MONOCYTES # BLD AUTO: 0.35 THOUSAND/ΜL (ref 0.17–1.22)
MONOCYTES NFR BLD AUTO: 6 % (ref 4–12)
NEUTROPHILS # BLD AUTO: 3.54 THOUSANDS/ΜL (ref 1.85–7.62)
NEUTS SEG NFR BLD AUTO: 65 % (ref 43–75)
NRBC BLD AUTO-RTO: 0 /100 WBCS
PLATELET # BLD AUTO: 349 THOUSANDS/UL (ref 149–390)
PMV BLD AUTO: 9.9 FL (ref 8.9–12.7)
POTASSIUM SERPL-SCNC: 3.4 MMOL/L (ref 3.5–5.3)
PROT SERPL-MCNC: 7.7 G/DL (ref 6.4–8.2)
RBC # BLD AUTO: 4.27 MILLION/UL (ref 3.81–5.12)
SODIUM SERPL-SCNC: 139 MMOL/L (ref 136–145)
WBC # BLD AUTO: 5.54 THOUSAND/UL (ref 4.31–10.16)

## 2020-05-29 PROCEDURE — 80053 COMPREHEN METABOLIC PANEL: CPT

## 2020-05-29 PROCEDURE — 99152 MOD SED SAME PHYS/QHP 5/>YRS: CPT

## 2020-05-29 PROCEDURE — 36561 INSERT TUNNELED CV CATH: CPT | Performed by: RADIOLOGY

## 2020-05-29 PROCEDURE — 77001 FLUOROGUIDE FOR VEIN DEVICE: CPT

## 2020-05-29 PROCEDURE — C1788 PORT, INDWELLING, IMP: HCPCS

## 2020-05-29 PROCEDURE — 77001 FLUOROGUIDE FOR VEIN DEVICE: CPT | Performed by: RADIOLOGY

## 2020-05-29 PROCEDURE — 85025 COMPLETE CBC W/AUTO DIFF WBC: CPT

## 2020-05-29 PROCEDURE — 99153 MOD SED SAME PHYS/QHP EA: CPT

## 2020-05-29 PROCEDURE — 76937 US GUIDE VASCULAR ACCESS: CPT

## 2020-05-29 PROCEDURE — 36561 INSERT TUNNELED CV CATH: CPT

## 2020-05-29 PROCEDURE — 36415 COLL VENOUS BLD VENIPUNCTURE: CPT

## 2020-05-29 PROCEDURE — 76937 US GUIDE VASCULAR ACCESS: CPT | Performed by: RADIOLOGY

## 2020-05-29 PROCEDURE — 99152 MOD SED SAME PHYS/QHP 5/>YRS: CPT | Performed by: RADIOLOGY

## 2020-05-29 RX ORDER — SODIUM CHLORIDE 9 MG/ML
75 INJECTION, SOLUTION INTRAVENOUS CONTINUOUS
Status: DISCONTINUED | OUTPATIENT
Start: 2020-05-29 | End: 2020-05-30 | Stop reason: HOSPADM

## 2020-05-29 RX ORDER — FENTANYL CITRATE 50 UG/ML
INJECTION, SOLUTION INTRAMUSCULAR; INTRAVENOUS CODE/TRAUMA/SEDATION MEDICATION
Status: COMPLETED | OUTPATIENT
Start: 2020-05-29 | End: 2020-05-29

## 2020-05-29 RX ORDER — MIDAZOLAM HYDROCHLORIDE 2 MG/2ML
INJECTION, SOLUTION INTRAMUSCULAR; INTRAVENOUS CODE/TRAUMA/SEDATION MEDICATION
Status: COMPLETED | OUTPATIENT
Start: 2020-05-29 | End: 2020-05-29

## 2020-05-29 RX ORDER — CEFAZOLIN SODIUM 1 G/50ML
1000 SOLUTION INTRAVENOUS ONCE
Status: COMPLETED | OUTPATIENT
Start: 2020-05-29 | End: 2020-05-29

## 2020-05-29 RX ADMIN — MIDAZOLAM HYDROCHLORIDE 0.5 MG: 1 INJECTION, SOLUTION INTRAMUSCULAR; INTRAVENOUS at 10:00

## 2020-05-29 RX ADMIN — FENTANYL CITRATE 25 MCG: 50 INJECTION INTRAMUSCULAR; INTRAVENOUS at 10:04

## 2020-05-29 RX ADMIN — FENTANYL CITRATE 50 MCG: 50 INJECTION INTRAMUSCULAR; INTRAVENOUS at 09:43

## 2020-05-29 RX ADMIN — MIDAZOLAM HYDROCHLORIDE 0.5 MG: 1 INJECTION, SOLUTION INTRAMUSCULAR; INTRAVENOUS at 09:43

## 2020-05-29 RX ADMIN — CEFAZOLIN SODIUM 1000 MG: 1 SOLUTION INTRAVENOUS at 09:20

## 2020-05-29 RX ADMIN — MIDAZOLAM HYDROCHLORIDE 1 MG: 1 INJECTION, SOLUTION INTRAMUSCULAR; INTRAVENOUS at 09:28

## 2020-05-29 RX ADMIN — FENTANYL CITRATE 25 MCG: 50 INJECTION INTRAMUSCULAR; INTRAVENOUS at 09:54

## 2020-05-29 RX ADMIN — FENTANYL CITRATE 25 MCG: 50 INJECTION INTRAMUSCULAR; INTRAVENOUS at 09:50

## 2020-05-29 RX ADMIN — MIDAZOLAM HYDROCHLORIDE 0.5 MG: 1 INJECTION, SOLUTION INTRAMUSCULAR; INTRAVENOUS at 09:46

## 2020-05-29 RX ADMIN — FENTANYL CITRATE 25 MCG: 50 INJECTION INTRAMUSCULAR; INTRAVENOUS at 10:13

## 2020-05-29 RX ADMIN — FENTANYL CITRATE 50 MCG: 50 INJECTION INTRAMUSCULAR; INTRAVENOUS at 09:28

## 2020-06-02 ENCOUNTER — DOCUMENTATION (OUTPATIENT)
Dept: NUTRITION | Facility: CLINIC | Age: 50
End: 2020-06-02

## 2020-06-03 ENCOUNTER — HOSPITAL ENCOUNTER (OUTPATIENT)
Dept: INFUSION CENTER | Facility: HOSPITAL | Age: 50
Discharge: HOME | End: 2020-06-03
Attending: INTERNAL MEDICINE
Payer: COMMERCIAL

## 2020-06-03 VITALS
SYSTOLIC BLOOD PRESSURE: 128 MMHG | HEART RATE: 95 BPM | DIASTOLIC BLOOD PRESSURE: 62 MMHG | RESPIRATION RATE: 16 BRPM | BODY MASS INDEX: 24.84 KG/M2 | HEIGHT: 60 IN | TEMPERATURE: 97.9 F | OXYGEN SATURATION: 98 % | WEIGHT: 126.54 LBS

## 2020-06-03 DIAGNOSIS — C20 RECTAL CANCER (HCC): Primary | ICD-10-CM

## 2020-06-03 PROCEDURE — 96367 TX/PROPH/DG ADDL SEQ IV INF: CPT

## 2020-06-03 PROCEDURE — G0498 CHEMO EXTEND IV INFUS W/PUMP: HCPCS

## 2020-06-03 PROCEDURE — 96415 CHEMO IV INFUSION ADDL HR: CPT

## 2020-06-03 PROCEDURE — 96411 CHEMO IV PUSH ADDL DRUG: CPT

## 2020-06-03 PROCEDURE — 96413 CHEMO IV INFUSION 1 HR: CPT

## 2020-06-03 PROCEDURE — 96368 THER/DIAG CONCURRENT INF: CPT

## 2020-06-03 RX ORDER — SODIUM CHLORIDE 9 MG/ML
20 INJECTION, SOLUTION INTRAVENOUS ONCE AS NEEDED
Status: DISCONTINUED | OUTPATIENT
Start: 2020-06-03 | End: 2020-06-06 | Stop reason: HOSPADM

## 2020-06-03 RX ORDER — FLUOROURACIL 50 MG/ML
400 INJECTION, SOLUTION INTRAVENOUS ONCE
Status: COMPLETED | OUTPATIENT
Start: 2020-06-03 | End: 2020-06-03

## 2020-06-03 RX ORDER — DEXTROSE MONOHYDRATE 50 MG/ML
20 INJECTION, SOLUTION INTRAVENOUS ONCE
Status: COMPLETED | OUTPATIENT
Start: 2020-06-03 | End: 2020-06-03

## 2020-06-03 RX ADMIN — FLUOROURACIL 610 MG: 50 INJECTION, SOLUTION INTRAVENOUS at 12:10

## 2020-06-03 RX ADMIN — OXALIPLATIN 129.2 MG: 5 INJECTION, SOLUTION INTRAVENOUS at 09:56

## 2020-06-03 RX ADMIN — DEXAMETHASONE SODIUM PHOSPHATE: 10 INJECTION, SOLUTION INTRAMUSCULAR; INTRAVENOUS at 08:55

## 2020-06-03 RX ADMIN — LEUCOVORIN CALCIUM 600 MG: 200 INJECTION, POWDER, LYOPHILIZED, FOR SOLUTION INTRAMUSCULAR; INTRAVENOUS at 09:52

## 2020-06-03 RX ADMIN — DEXTROSE 20 ML/HR: 50 INJECTION, SOLUTION INTRAVENOUS at 08:54

## 2020-06-04 ENCOUNTER — TELEPHONE (OUTPATIENT)
Dept: HEMATOLOGY ONCOLOGY | Facility: CLINIC | Age: 50
End: 2020-06-04

## 2020-06-04 DIAGNOSIS — T45.1X5A CHEMOTHERAPY-INDUCED NAUSEA: Primary | ICD-10-CM

## 2020-06-04 DIAGNOSIS — R11.0 CHEMOTHERAPY-INDUCED NAUSEA: Primary | ICD-10-CM

## 2020-06-04 DIAGNOSIS — K13.79 MOUTH SORES: Primary | ICD-10-CM

## 2020-06-04 RX ORDER — METOCLOPRAMIDE 10 MG/1
10 TABLET ORAL 4 TIMES DAILY
Qty: 30 TABLET | Refills: 1 | Status: SHIPPED | OUTPATIENT
Start: 2020-06-04 | End: 2021-03-04 | Stop reason: ALTCHOICE

## 2020-06-05 ENCOUNTER — HOSPITAL ENCOUNTER (OUTPATIENT)
Dept: INFUSION CENTER | Facility: HOSPITAL | Age: 50
Discharge: HOME | End: 2020-06-05
Attending: INTERNAL MEDICINE

## 2020-06-05 VITALS — TEMPERATURE: 97.6 F

## 2020-06-05 DIAGNOSIS — C20 RECTAL CANCER (HCC): Primary | ICD-10-CM

## 2020-06-09 DIAGNOSIS — C20 RECTAL CANCER (HCC): Primary | ICD-10-CM

## 2020-06-13 ENCOUNTER — APPOINTMENT (OUTPATIENT)
Dept: LAB | Facility: CLINIC | Age: 50
End: 2020-06-13
Payer: COMMERCIAL

## 2020-06-13 ENCOUNTER — TRANSCRIBE ORDERS (OUTPATIENT)
Dept: LAB | Facility: CLINIC | Age: 50
End: 2020-06-13

## 2020-06-13 DIAGNOSIS — C20 RECTAL CANCER (HCC): ICD-10-CM

## 2020-06-13 LAB
ALBUMIN SERPL BCP-MCNC: 3.6 G/DL (ref 3.5–5)
ALP SERPL-CCNC: 70 U/L (ref 46–116)
ALT SERPL W P-5'-P-CCNC: 26 U/L (ref 12–78)
ANION GAP SERPL CALCULATED.3IONS-SCNC: 6 MMOL/L (ref 4–13)
AST SERPL W P-5'-P-CCNC: 20 U/L (ref 5–45)
BASOPHILS # BLD AUTO: 0.02 THOUSANDS/ΜL (ref 0–0.1)
BASOPHILS NFR BLD AUTO: 1 % (ref 0–1)
BILIRUB SERPL-MCNC: 0.28 MG/DL (ref 0.2–1)
BUN SERPL-MCNC: 13 MG/DL (ref 5–25)
CALCIUM SERPL-MCNC: 9.2 MG/DL (ref 8.3–10.1)
CHLORIDE SERPL-SCNC: 103 MMOL/L (ref 100–108)
CO2 SERPL-SCNC: 30 MMOL/L (ref 21–32)
CREAT SERPL-MCNC: 0.54 MG/DL (ref 0.6–1.3)
EOSINOPHIL # BLD AUTO: 0.09 THOUSAND/ΜL (ref 0–0.61)
EOSINOPHIL NFR BLD AUTO: 2 % (ref 0–6)
ERYTHROCYTE [DISTWIDTH] IN BLOOD BY AUTOMATED COUNT: 12.5 % (ref 11.6–15.1)
GFR SERPL CREATININE-BSD FRML MDRD: 110 ML/MIN/1.73SQ M
GLUCOSE SERPL-MCNC: 89 MG/DL (ref 65–140)
HCT VFR BLD AUTO: 38.7 % (ref 34.8–46.1)
HGB BLD-MCNC: 12.5 G/DL (ref 11.5–15.4)
IMM GRANULOCYTES # BLD AUTO: 0.01 THOUSAND/UL (ref 0–0.2)
IMM GRANULOCYTES NFR BLD AUTO: 0 % (ref 0–2)
LYMPHOCYTES # BLD AUTO: 1.11 THOUSANDS/ΜL (ref 0.6–4.47)
LYMPHOCYTES NFR BLD AUTO: 27 % (ref 14–44)
MCH RBC QN AUTO: 30.7 PG (ref 26.8–34.3)
MCHC RBC AUTO-ENTMCNC: 32.3 G/DL (ref 31.4–37.4)
MCV RBC AUTO: 95 FL (ref 82–98)
MONOCYTES # BLD AUTO: 0.43 THOUSAND/ΜL (ref 0.17–1.22)
MONOCYTES NFR BLD AUTO: 10 % (ref 4–12)
NEUTROPHILS # BLD AUTO: 2.48 THOUSANDS/ΜL (ref 1.85–7.62)
NEUTS SEG NFR BLD AUTO: 60 % (ref 43–75)
NRBC BLD AUTO-RTO: 0 /100 WBCS
PLATELET # BLD AUTO: 272 THOUSANDS/UL (ref 149–390)
PMV BLD AUTO: 9.7 FL (ref 8.9–12.7)
POTASSIUM SERPL-SCNC: 4.3 MMOL/L (ref 3.5–5.3)
PROT SERPL-MCNC: 7.4 G/DL (ref 6.4–8.2)
RBC # BLD AUTO: 4.07 MILLION/UL (ref 3.81–5.12)
SODIUM SERPL-SCNC: 139 MMOL/L (ref 136–145)
WBC # BLD AUTO: 4.14 THOUSAND/UL (ref 4.31–10.16)

## 2020-06-13 PROCEDURE — 80053 COMPREHEN METABOLIC PANEL: CPT

## 2020-06-13 PROCEDURE — 36415 COLL VENOUS BLD VENIPUNCTURE: CPT

## 2020-06-13 PROCEDURE — 85025 COMPLETE CBC W/AUTO DIFF WBC: CPT

## 2020-06-16 ENCOUNTER — OFFICE VISIT (OUTPATIENT)
Dept: HEMATOLOGY ONCOLOGY | Facility: CLINIC | Age: 50
End: 2020-06-16
Payer: COMMERCIAL

## 2020-06-16 VITALS
BODY MASS INDEX: 25.13 KG/M2 | RESPIRATION RATE: 18 BRPM | SYSTOLIC BLOOD PRESSURE: 138 MMHG | WEIGHT: 128 LBS | DIASTOLIC BLOOD PRESSURE: 84 MMHG | OXYGEN SATURATION: 99 % | HEIGHT: 60 IN | TEMPERATURE: 97.8 F | HEART RATE: 95 BPM

## 2020-06-16 DIAGNOSIS — C20 RECTAL CANCER (HCC): Primary | ICD-10-CM

## 2020-06-16 PROCEDURE — 99214 OFFICE O/P EST MOD 30 MIN: CPT | Performed by: INTERNAL MEDICINE

## 2020-06-16 RX ORDER — SODIUM CHLORIDE 9 MG/ML
20 INJECTION, SOLUTION INTRAVENOUS ONCE AS NEEDED
Status: CANCELLED | OUTPATIENT
Start: 2020-07-15

## 2020-06-16 RX ORDER — SODIUM CHLORIDE 9 MG/ML
20 INJECTION, SOLUTION INTRAVENOUS ONCE AS NEEDED
Status: CANCELLED | OUTPATIENT
Start: 2020-07-29

## 2020-06-16 RX ORDER — FLUOROURACIL 50 MG/ML
400 INJECTION, SOLUTION INTRAVENOUS ONCE
Status: CANCELLED | OUTPATIENT
Start: 2020-07-15

## 2020-06-16 RX ORDER — DEXTROSE MONOHYDRATE 50 MG/ML
20 INJECTION, SOLUTION INTRAVENOUS ONCE
Status: CANCELLED | OUTPATIENT
Start: 2020-07-15

## 2020-06-16 RX ORDER — DEXTROSE MONOHYDRATE 50 MG/ML
20 INJECTION, SOLUTION INTRAVENOUS ONCE
Status: CANCELLED | OUTPATIENT
Start: 2020-07-29

## 2020-06-16 RX ORDER — FLUOROURACIL 50 MG/ML
400 INJECTION, SOLUTION INTRAVENOUS ONCE
Status: CANCELLED | OUTPATIENT
Start: 2020-07-29

## 2020-06-17 ENCOUNTER — HOSPITAL ENCOUNTER (OUTPATIENT)
Dept: INFUSION CENTER | Facility: HOSPITAL | Age: 50
Discharge: HOME | End: 2020-06-17
Attending: INTERNAL MEDICINE
Payer: COMMERCIAL

## 2020-06-17 VITALS
HEART RATE: 66 BPM | BODY MASS INDEX: 25.02 KG/M2 | RESPIRATION RATE: 18 BRPM | WEIGHT: 127.43 LBS | OXYGEN SATURATION: 98 % | SYSTOLIC BLOOD PRESSURE: 124 MMHG | HEIGHT: 60 IN | TEMPERATURE: 97.8 F | DIASTOLIC BLOOD PRESSURE: 69 MMHG

## 2020-06-17 DIAGNOSIS — T45.1X5A CHEMOTHERAPY-INDUCED NAUSEA: Primary | ICD-10-CM

## 2020-06-17 DIAGNOSIS — C20 RECTAL CANCER (HCC): Primary | ICD-10-CM

## 2020-06-17 DIAGNOSIS — R11.0 CHEMOTHERAPY-INDUCED NAUSEA: Primary | ICD-10-CM

## 2020-06-17 PROCEDURE — 96368 THER/DIAG CONCURRENT INF: CPT

## 2020-06-17 PROCEDURE — G0498 CHEMO EXTEND IV INFUS W/PUMP: HCPCS

## 2020-06-17 PROCEDURE — 96415 CHEMO IV INFUSION ADDL HR: CPT

## 2020-06-17 PROCEDURE — 96411 CHEMO IV PUSH ADDL DRUG: CPT

## 2020-06-17 PROCEDURE — 96413 CHEMO IV INFUSION 1 HR: CPT

## 2020-06-17 PROCEDURE — 96367 TX/PROPH/DG ADDL SEQ IV INF: CPT

## 2020-06-17 RX ORDER — LORAZEPAM 0.5 MG/1
0.5 TABLET ORAL EVERY 8 HOURS PRN
Qty: 30 TABLET | Refills: 0 | Status: SHIPPED | OUTPATIENT
Start: 2020-06-17 | End: 2020-07-01 | Stop reason: SDUPTHER

## 2020-06-17 RX ORDER — DEXTROSE MONOHYDRATE 50 MG/ML
20 INJECTION, SOLUTION INTRAVENOUS ONCE
Status: COMPLETED | OUTPATIENT
Start: 2020-06-17 | End: 2020-06-17

## 2020-06-17 RX ORDER — FLUOROURACIL 50 MG/ML
400 INJECTION, SOLUTION INTRAVENOUS ONCE
Status: COMPLETED | OUTPATIENT
Start: 2020-06-17 | End: 2020-06-17

## 2020-06-17 RX ORDER — SODIUM CHLORIDE 9 MG/ML
20 INJECTION, SOLUTION INTRAVENOUS ONCE AS NEEDED
Status: DISCONTINUED | OUTPATIENT
Start: 2020-06-17 | End: 2020-06-20 | Stop reason: HOSPADM

## 2020-06-17 RX ADMIN — FLUOROURACIL 610 MG: 50 INJECTION, SOLUTION INTRAVENOUS at 11:41

## 2020-06-17 RX ADMIN — DEXTROSE 20 ML/HR: 50 INJECTION, SOLUTION INTRAVENOUS at 08:54

## 2020-06-17 RX ADMIN — DEXAMETHASONE SODIUM PHOSPHATE: 10 INJECTION, SOLUTION INTRAMUSCULAR; INTRAVENOUS at 08:54

## 2020-06-17 RX ADMIN — LEUCOVORIN CALCIUM 600 MG: 200 INJECTION, POWDER, LYOPHILIZED, FOR SOLUTION INTRAMUSCULAR; INTRAVENOUS at 09:33

## 2020-06-17 RX ADMIN — OXALIPLATIN 129.2 MG: 5 INJECTION, SOLUTION INTRAVENOUS at 09:36

## 2020-06-19 ENCOUNTER — HOSPITAL ENCOUNTER (OUTPATIENT)
Dept: INFUSION CENTER | Facility: HOSPITAL | Age: 50
Discharge: HOME | End: 2020-06-19
Attending: INTERNAL MEDICINE

## 2020-06-19 ENCOUNTER — TELEPHONE (OUTPATIENT)
Dept: HEMATOLOGY ONCOLOGY | Facility: CLINIC | Age: 50
End: 2020-06-19

## 2020-06-19 VITALS — TEMPERATURE: 97.2 F

## 2020-06-19 DIAGNOSIS — C20 RECTAL CANCER (HCC): Primary | ICD-10-CM

## 2020-06-24 ENCOUNTER — OFFICE VISIT (OUTPATIENT)
Dept: HEMATOLOGY ONCOLOGY | Facility: CLINIC | Age: 50
End: 2020-06-24
Payer: COMMERCIAL

## 2020-06-24 ENCOUNTER — TELEPHONE (OUTPATIENT)
Dept: HEMATOLOGY ONCOLOGY | Facility: CLINIC | Age: 50
End: 2020-06-24

## 2020-06-24 VITALS
BODY MASS INDEX: 24.67 KG/M2 | HEIGHT: 60 IN | RESPIRATION RATE: 18 BRPM | HEART RATE: 95 BPM | WEIGHT: 125.66 LBS | SYSTOLIC BLOOD PRESSURE: 132 MMHG | TEMPERATURE: 97.5 F | OXYGEN SATURATION: 97 % | DIASTOLIC BLOOD PRESSURE: 78 MMHG

## 2020-06-24 DIAGNOSIS — C20 RECTAL CANCER (HCC): Primary | ICD-10-CM

## 2020-06-24 PROCEDURE — 99215 OFFICE O/P EST HI 40 MIN: CPT | Performed by: INTERNAL MEDICINE

## 2020-06-24 RX ORDER — DEXTROSE MONOHYDRATE 50 MG/ML
20 INJECTION, SOLUTION INTRAVENOUS ONCE
Status: CANCELLED | OUTPATIENT
Start: 2020-07-16

## 2020-06-24 RX ORDER — DEXTROSE MONOHYDRATE 50 MG/ML
20 INJECTION, SOLUTION INTRAVENOUS ONCE
Status: CANCELLED | OUTPATIENT
Start: 2020-08-27

## 2020-06-24 RX ORDER — DEXTROSE MONOHYDRATE 50 MG/ML
20 INJECTION, SOLUTION INTRAVENOUS ONCE
Status: CANCELLED | OUTPATIENT
Start: 2020-08-06

## 2020-06-24 RX ORDER — SODIUM CHLORIDE 9 MG/ML
20 INJECTION, SOLUTION INTRAVENOUS ONCE AS NEEDED
Status: CANCELLED | OUTPATIENT
Start: 2020-08-27

## 2020-06-24 RX ORDER — SODIUM CHLORIDE 9 MG/ML
20 INJECTION, SOLUTION INTRAVENOUS ONCE AS NEEDED
Status: CANCELLED | OUTPATIENT
Start: 2020-08-06

## 2020-06-24 RX ORDER — SODIUM CHLORIDE 9 MG/ML
20 INJECTION, SOLUTION INTRAVENOUS ONCE AS NEEDED
Status: CANCELLED | OUTPATIENT
Start: 2020-07-16

## 2020-06-25 ENCOUNTER — DOCUMENTATION (OUTPATIENT)
Dept: HEMATOLOGY ONCOLOGY | Facility: CLINIC | Age: 50
End: 2020-06-25

## 2020-06-25 DIAGNOSIS — C20 RECTAL CANCER (HCC): Primary | ICD-10-CM

## 2020-06-25 RX ORDER — CAPECITABINE 500 MG/1
1500 TABLET, FILM COATED ORAL 2 TIMES DAILY
Qty: 84 TABLET | Refills: 3 | Status: SHIPPED | OUTPATIENT
Start: 2020-06-25 | End: 2020-09-16

## 2020-07-01 DIAGNOSIS — C20 RECTAL CANCER (HCC): ICD-10-CM

## 2020-07-01 DIAGNOSIS — T45.1X5A CHEMOTHERAPY-INDUCED NAUSEA: ICD-10-CM

## 2020-07-01 DIAGNOSIS — R11.0 CHEMOTHERAPY-INDUCED NAUSEA: ICD-10-CM

## 2020-07-01 RX ORDER — ONDANSETRON 4 MG/1
4 TABLET, FILM COATED ORAL EVERY 8 HOURS PRN
Qty: 30 TABLET | Refills: 1 | Status: SHIPPED | OUTPATIENT
Start: 2020-07-01 | End: 2020-10-01 | Stop reason: SDUPTHER

## 2020-07-01 RX ORDER — LORAZEPAM 0.5 MG/1
0.5 TABLET ORAL EVERY 8 HOURS PRN
Qty: 30 TABLET | Refills: 0 | Status: SHIPPED | OUTPATIENT
Start: 2020-07-01 | End: 2021-03-04 | Stop reason: ALTCHOICE

## 2020-07-06 ENCOUNTER — APPOINTMENT (OUTPATIENT)
Dept: LAB | Facility: CLINIC | Age: 50
End: 2020-07-06
Payer: COMMERCIAL

## 2020-07-06 DIAGNOSIS — C20 RECTAL CANCER (HCC): ICD-10-CM

## 2020-07-06 LAB
ALBUMIN SERPL BCP-MCNC: 3.6 G/DL (ref 3.5–5)
ALP SERPL-CCNC: 85 U/L (ref 46–116)
ALT SERPL W P-5'-P-CCNC: 45 U/L (ref 12–78)
ANION GAP SERPL CALCULATED.3IONS-SCNC: 7 MMOL/L (ref 4–13)
AST SERPL W P-5'-P-CCNC: 24 U/L (ref 5–45)
BASOPHILS # BLD AUTO: 0.03 THOUSANDS/ΜL (ref 0–0.1)
BASOPHILS NFR BLD AUTO: 1 % (ref 0–1)
BILIRUB SERPL-MCNC: 0.48 MG/DL (ref 0.2–1)
BUN SERPL-MCNC: 14 MG/DL (ref 5–25)
CALCIUM SERPL-MCNC: 9.1 MG/DL (ref 8.3–10.1)
CHLORIDE SERPL-SCNC: 105 MMOL/L (ref 100–108)
CO2 SERPL-SCNC: 27 MMOL/L (ref 21–32)
CREAT SERPL-MCNC: 0.69 MG/DL (ref 0.6–1.3)
EOSINOPHIL # BLD AUTO: 0.06 THOUSAND/ΜL (ref 0–0.61)
EOSINOPHIL NFR BLD AUTO: 2 % (ref 0–6)
ERYTHROCYTE [DISTWIDTH] IN BLOOD BY AUTOMATED COUNT: 14.6 % (ref 11.6–15.1)
GFR SERPL CREATININE-BSD FRML MDRD: 102 ML/MIN/1.73SQ M
GLUCOSE P FAST SERPL-MCNC: 86 MG/DL (ref 65–99)
HCT VFR BLD AUTO: 40.4 % (ref 34.8–46.1)
HGB BLD-MCNC: 12.7 G/DL (ref 11.5–15.4)
IMM GRANULOCYTES # BLD AUTO: 0 THOUSAND/UL (ref 0–0.2)
IMM GRANULOCYTES NFR BLD AUTO: 0 % (ref 0–2)
LYMPHOCYTES # BLD AUTO: 1.96 THOUSANDS/ΜL (ref 0.6–4.47)
LYMPHOCYTES NFR BLD AUTO: 57 % (ref 14–44)
MCH RBC QN AUTO: 30.5 PG (ref 26.8–34.3)
MCHC RBC AUTO-ENTMCNC: 31.4 G/DL (ref 31.4–37.4)
MCV RBC AUTO: 97 FL (ref 82–98)
MONOCYTES # BLD AUTO: 0.53 THOUSAND/ΜL (ref 0.17–1.22)
MONOCYTES NFR BLD AUTO: 16 % (ref 4–12)
NEUTROPHILS # BLD AUTO: 0.82 THOUSANDS/ΜL (ref 1.85–7.62)
NEUTS SEG NFR BLD AUTO: 24 % (ref 43–75)
NRBC BLD AUTO-RTO: 0 /100 WBCS
PLATELET # BLD AUTO: 245 THOUSANDS/UL (ref 149–390)
PMV BLD AUTO: 10.4 FL (ref 8.9–12.7)
POTASSIUM SERPL-SCNC: 3.7 MMOL/L (ref 3.5–5.3)
PROT SERPL-MCNC: 6.8 G/DL (ref 6.4–8.2)
RBC # BLD AUTO: 4.17 MILLION/UL (ref 3.81–5.12)
SODIUM SERPL-SCNC: 139 MMOL/L (ref 136–145)
WBC # BLD AUTO: 3.4 THOUSAND/UL (ref 4.31–10.16)

## 2020-07-06 PROCEDURE — 36415 COLL VENOUS BLD VENIPUNCTURE: CPT

## 2020-07-06 PROCEDURE — 80053 COMPREHEN METABOLIC PANEL: CPT

## 2020-07-06 PROCEDURE — 85025 COMPLETE CBC W/AUTO DIFF WBC: CPT

## 2020-07-07 ENCOUNTER — TELEPHONE (OUTPATIENT)
Dept: HEMATOLOGY ONCOLOGY | Facility: MEDICAL CENTER | Age: 50
End: 2020-07-07

## 2020-07-07 NOTE — TELEPHONE ENCOUNTER
patient called in to follow up with [facility] in regards to Chemo sessions  A good call back number is [phone]

## 2020-07-07 NOTE — TELEPHONE ENCOUNTER
The following is from Dr Ricki Ortega "Her [address] is 0 78  She is due for Oxaliplatin on 7/8  Can you delay chemo by 1 week  Needs CBC in a week   Let her know that she may start Xeloda when she actually start IV Oxaliplatin  " Made patient aware and addressed her concerns  She verbalized understanding and agreed to plan

## 2020-07-08 ENCOUNTER — HOSPITAL ENCOUNTER (OUTPATIENT)
Dept: INFUSION CENTER | Facility: HOSPITAL | Age: 50
Discharge: HOME | End: 2020-07-08
Attending: INTERNAL MEDICINE

## 2020-07-14 ENCOUNTER — APPOINTMENT (OUTPATIENT)
Dept: LAB | Facility: CLINIC | Age: 50
End: 2020-07-14
Payer: COMMERCIAL

## 2020-07-14 DIAGNOSIS — C20 RECTAL CANCER (HCC): ICD-10-CM

## 2020-07-14 LAB
ALBUMIN SERPL BCP-MCNC: 3.6 G/DL (ref 3.5–5)
ALP SERPL-CCNC: 86 U/L (ref 46–116)
ALT SERPL W P-5'-P-CCNC: 36 U/L (ref 12–78)
ANION GAP SERPL CALCULATED.3IONS-SCNC: 4 MMOL/L (ref 4–13)
AST SERPL W P-5'-P-CCNC: 19 U/L (ref 5–45)
BASOPHILS # BLD AUTO: 0.05 THOUSANDS/ΜL (ref 0–0.1)
BASOPHILS NFR BLD AUTO: 1 % (ref 0–1)
BILIRUB SERPL-MCNC: 0.43 MG/DL (ref 0.2–1)
BUN SERPL-MCNC: 14 MG/DL (ref 5–25)
CALCIUM SERPL-MCNC: 9.3 MG/DL (ref 8.3–10.1)
CHLORIDE SERPL-SCNC: 104 MMOL/L (ref 100–108)
CO2 SERPL-SCNC: 31 MMOL/L (ref 21–32)
CREAT SERPL-MCNC: 0.68 MG/DL (ref 0.6–1.3)
EOSINOPHIL # BLD AUTO: 0.04 THOUSAND/ΜL (ref 0–0.61)
EOSINOPHIL NFR BLD AUTO: 1 % (ref 0–6)
ERYTHROCYTE [DISTWIDTH] IN BLOOD BY AUTOMATED COUNT: 15 % (ref 11.6–15.1)
GFR SERPL CREATININE-BSD FRML MDRD: 102 ML/MIN/1.73SQ M
GLUCOSE P FAST SERPL-MCNC: 89 MG/DL (ref 65–99)
HCT VFR BLD AUTO: 40.7 % (ref 34.8–46.1)
HGB BLD-MCNC: 13 G/DL (ref 11.5–15.4)
IMM GRANULOCYTES # BLD AUTO: 0.02 THOUSAND/UL (ref 0–0.2)
IMM GRANULOCYTES NFR BLD AUTO: 0 % (ref 0–2)
LYMPHOCYTES # BLD AUTO: 1.76 THOUSANDS/ΜL (ref 0.6–4.47)
LYMPHOCYTES NFR BLD AUTO: 34 % (ref 14–44)
MCH RBC QN AUTO: 31 PG (ref 26.8–34.3)
MCHC RBC AUTO-ENTMCNC: 31.9 G/DL (ref 31.4–37.4)
MCV RBC AUTO: 97 FL (ref 82–98)
MONOCYTES # BLD AUTO: 0.52 THOUSAND/ΜL (ref 0.17–1.22)
MONOCYTES NFR BLD AUTO: 10 % (ref 4–12)
NEUTROPHILS # BLD AUTO: 2.76 THOUSANDS/ΜL (ref 1.85–7.62)
NEUTS SEG NFR BLD AUTO: 54 % (ref 43–75)
NRBC BLD AUTO-RTO: 0 /100 WBCS
PLATELET # BLD AUTO: 220 THOUSANDS/UL (ref 149–390)
PMV BLD AUTO: 10.5 FL (ref 8.9–12.7)
POTASSIUM SERPL-SCNC: 4.3 MMOL/L (ref 3.5–5.3)
PROT SERPL-MCNC: 7.3 G/DL (ref 6.4–8.2)
RBC # BLD AUTO: 4.19 MILLION/UL (ref 3.81–5.12)
SODIUM SERPL-SCNC: 139 MMOL/L (ref 136–145)
WBC # BLD AUTO: 5.15 THOUSAND/UL (ref 4.31–10.16)

## 2020-07-14 PROCEDURE — 80053 COMPREHEN METABOLIC PANEL: CPT

## 2020-07-14 PROCEDURE — 36415 COLL VENOUS BLD VENIPUNCTURE: CPT

## 2020-07-14 PROCEDURE — 85025 COMPLETE CBC W/AUTO DIFF WBC: CPT

## 2020-07-16 ENCOUNTER — HOSPITAL ENCOUNTER (OUTPATIENT)
Dept: INFUSION CENTER | Facility: HOSPITAL | Age: 50
Discharge: HOME | End: 2020-07-16
Attending: INTERNAL MEDICINE
Payer: COMMERCIAL

## 2020-07-16 VITALS
SYSTOLIC BLOOD PRESSURE: 127 MMHG | OXYGEN SATURATION: 100 % | RESPIRATION RATE: 16 BRPM | HEIGHT: 60 IN | BODY MASS INDEX: 25.32 KG/M2 | TEMPERATURE: 97.1 F | WEIGHT: 128.97 LBS | DIASTOLIC BLOOD PRESSURE: 58 MMHG | HEART RATE: 90 BPM

## 2020-07-16 DIAGNOSIS — C20 RECTAL CANCER (HCC): Primary | ICD-10-CM

## 2020-07-16 PROCEDURE — 96415 CHEMO IV INFUSION ADDL HR: CPT

## 2020-07-16 PROCEDURE — 96413 CHEMO IV INFUSION 1 HR: CPT

## 2020-07-16 PROCEDURE — 96367 TX/PROPH/DG ADDL SEQ IV INF: CPT

## 2020-07-16 RX ORDER — SODIUM CHLORIDE 9 MG/ML
20 INJECTION, SOLUTION INTRAVENOUS ONCE AS NEEDED
Status: DISCONTINUED | OUTPATIENT
Start: 2020-07-16 | End: 2020-07-19 | Stop reason: HOSPADM

## 2020-07-16 RX ORDER — DEXTROSE MONOHYDRATE 50 MG/ML
20 INJECTION, SOLUTION INTRAVENOUS ONCE
Status: COMPLETED | OUTPATIENT
Start: 2020-07-16 | End: 2020-07-16

## 2020-07-16 RX ADMIN — DEXAMETHASONE SODIUM PHOSPHATE: 10 INJECTION, SOLUTION INTRAMUSCULAR; INTRAVENOUS at 08:49

## 2020-07-16 RX ADMIN — OXALIPLATIN 130.05 MG: 5 INJECTION, SOLUTION INTRAVENOUS at 09:31

## 2020-07-16 RX ADMIN — DEXTROSE 20 ML/HR: 50 INJECTION, SOLUTION INTRAVENOUS at 08:48

## 2020-07-16 NOTE — PLAN OF CARE
Problem: Potential for Falls  Goal: Patient will remain free of falls  Description  INTERVENTIONS:  - Assess patient frequently for physical needs  -  Identify cognitive and physical deficits and behaviors that affect risk of falls  -  Whitney fall precautions as indicated by assessment   - Educate patient/family on patient safety including physical limitations  - Instruct patient to call for assistance with activity based on assessment  - Modify environment to reduce risk of injury  - Consider OT/PT consult to assist with strengthening/mobility  Outcome: Progressing     Problem: HEMATOLOGIC - ADULT  Goal: Maintains hematologic stability  Description  INTERVENTIONS  - Assess for signs and symptoms of bleeding or hemorrhage  - Monitor labs  - Administer supportive blood products/factors as ordered and appropriate  Outcome: Progressing     Problem: Knowledge Deficit  Goal: Patient/family/caregiver demonstrates understanding of disease process, treatment plan, medications, and discharge instructions  Description  Complete learning assessment and assess knowledge base    Interventions:  - Provide teaching at level of understanding  - Provide teaching via preferred learning methods  Outcome: Progressing     Problem: DISCHARGE PLANNING  Goal: Discharge to home or other facility with appropriate resources  Description  INTERVENTIONS:  - Identify barriers to discharge w/patient and caregiver  - Arrange for needed discharge resources and transportation as appropriate  - Identify discharge learning needs (meds, wound care, etc )  - Arrange for interpretive services to assist at discharge as needed  - Refer to Case Management Department for coordinating discharge planning if the patient needs post-hospital services based on physician/advanced practitioner order or complex needs related to functional status, cognitive ability, or social support system  Outcome: Progressing

## 2020-08-03 ENCOUNTER — APPOINTMENT (OUTPATIENT)
Dept: LAB | Facility: CLINIC | Age: 50
End: 2020-08-03
Payer: COMMERCIAL

## 2020-08-03 DIAGNOSIS — C20 RECTAL CANCER (HCC): ICD-10-CM

## 2020-08-03 LAB
ALBUMIN SERPL BCP-MCNC: 3.9 G/DL (ref 3.5–5)
ALP SERPL-CCNC: 79 U/L (ref 46–116)
ALT SERPL W P-5'-P-CCNC: 45 U/L (ref 12–78)
ANION GAP SERPL CALCULATED.3IONS-SCNC: 3 MMOL/L (ref 4–13)
AST SERPL W P-5'-P-CCNC: 28 U/L (ref 5–45)
BASOPHILS # BLD AUTO: 0.04 THOUSANDS/ΜL (ref 0–0.1)
BASOPHILS NFR BLD AUTO: 1 % (ref 0–1)
BILIRUB SERPL-MCNC: 0.45 MG/DL (ref 0.2–1)
BUN SERPL-MCNC: 12 MG/DL (ref 5–25)
CALCIUM SERPL-MCNC: 9.6 MG/DL (ref 8.3–10.1)
CHLORIDE SERPL-SCNC: 108 MMOL/L (ref 100–108)
CO2 SERPL-SCNC: 29 MMOL/L (ref 21–32)
CREAT SERPL-MCNC: 0.72 MG/DL (ref 0.6–1.3)
EOSINOPHIL # BLD AUTO: 0.11 THOUSAND/ΜL (ref 0–0.61)
EOSINOPHIL NFR BLD AUTO: 2 % (ref 0–6)
ERYTHROCYTE [DISTWIDTH] IN BLOOD BY AUTOMATED COUNT: 15.8 % (ref 11.6–15.1)
GFR SERPL CREATININE-BSD FRML MDRD: 98 ML/MIN/1.73SQ M
GLUCOSE P FAST SERPL-MCNC: 95 MG/DL (ref 65–99)
HCT VFR BLD AUTO: 41 % (ref 34.8–46.1)
HGB BLD-MCNC: 13.1 G/DL (ref 11.5–15.4)
IMM GRANULOCYTES # BLD AUTO: 0.01 THOUSAND/UL (ref 0–0.2)
IMM GRANULOCYTES NFR BLD AUTO: 0 % (ref 0–2)
LYMPHOCYTES # BLD AUTO: 1.72 THOUSANDS/ΜL (ref 0.6–4.47)
LYMPHOCYTES NFR BLD AUTO: 37 % (ref 14–44)
MCH RBC QN AUTO: 31 PG (ref 26.8–34.3)
MCHC RBC AUTO-ENTMCNC: 32 G/DL (ref 31.4–37.4)
MCV RBC AUTO: 97 FL (ref 82–98)
MONOCYTES # BLD AUTO: 0.54 THOUSAND/ΜL (ref 0.17–1.22)
MONOCYTES NFR BLD AUTO: 12 % (ref 4–12)
NEUTROPHILS # BLD AUTO: 2.23 THOUSANDS/ΜL (ref 1.85–7.62)
NEUTS SEG NFR BLD AUTO: 48 % (ref 43–75)
NRBC BLD AUTO-RTO: 0 /100 WBCS
PLATELET # BLD AUTO: 344 THOUSANDS/UL (ref 149–390)
PMV BLD AUTO: 10.1 FL (ref 8.9–12.7)
POTASSIUM SERPL-SCNC: 4 MMOL/L (ref 3.5–5.3)
PROT SERPL-MCNC: 7.9 G/DL (ref 6.4–8.2)
RBC # BLD AUTO: 4.22 MILLION/UL (ref 3.81–5.12)
SODIUM SERPL-SCNC: 140 MMOL/L (ref 136–145)
WBC # BLD AUTO: 4.65 THOUSAND/UL (ref 4.31–10.16)

## 2020-08-03 PROCEDURE — 80053 COMPREHEN METABOLIC PANEL: CPT

## 2020-08-03 PROCEDURE — 36415 COLL VENOUS BLD VENIPUNCTURE: CPT

## 2020-08-03 PROCEDURE — 85025 COMPLETE CBC W/AUTO DIFF WBC: CPT

## 2020-08-04 ENCOUNTER — DOCUMENTATION (OUTPATIENT)
Dept: HEMATOLOGY ONCOLOGY | Facility: CLINIC | Age: 50
End: 2020-08-04

## 2020-08-04 DIAGNOSIS — R11.0 CHEMOTHERAPY-INDUCED NAUSEA: Primary | ICD-10-CM

## 2020-08-04 DIAGNOSIS — T45.1X5A CHEMOTHERAPY-INDUCED NAUSEA: Primary | ICD-10-CM

## 2020-08-04 RX ORDER — PROCHLORPERAZINE MALEATE 10 MG
10 TABLET ORAL EVERY 6 HOURS PRN
Qty: 30 TABLET | Refills: 0 | Status: SHIPPED | OUTPATIENT
Start: 2020-08-04 | End: 2020-09-01 | Stop reason: SDUPTHER

## 2020-08-05 ENCOUNTER — OFFICE VISIT (OUTPATIENT)
Dept: HEMATOLOGY ONCOLOGY | Facility: CLINIC | Age: 50
End: 2020-08-05
Payer: COMMERCIAL

## 2020-08-05 VITALS
SYSTOLIC BLOOD PRESSURE: 124 MMHG | TEMPERATURE: 97.7 F | WEIGHT: 128.5 LBS | OXYGEN SATURATION: 97 % | RESPIRATION RATE: 18 BRPM | HEART RATE: 79 BPM | DIASTOLIC BLOOD PRESSURE: 78 MMHG | HEIGHT: 60 IN | BODY MASS INDEX: 25.23 KG/M2

## 2020-08-05 DIAGNOSIS — C20 RECTAL CANCER (HCC): Primary | ICD-10-CM

## 2020-08-05 PROCEDURE — 99214 OFFICE O/P EST MOD 30 MIN: CPT | Performed by: INTERNAL MEDICINE

## 2020-08-05 RX ORDER — SODIUM CHLORIDE 9 MG/ML
20 INJECTION, SOLUTION INTRAVENOUS ONCE AS NEEDED
Status: CANCELLED | OUTPATIENT
Start: 2020-09-17

## 2020-08-05 RX ORDER — DEXTROSE MONOHYDRATE 50 MG/ML
20 INJECTION, SOLUTION INTRAVENOUS ONCE
Status: CANCELLED | OUTPATIENT
Start: 2020-09-17

## 2020-08-05 RX ORDER — LORAZEPAM 2 MG/ML
0.5 INJECTION INTRAMUSCULAR ONCE
Status: CANCELLED
Start: 2020-08-06

## 2020-08-05 NOTE — PROGRESS NOTES
Hematology / Oncology Outpatient Follow Up Note    Gar Go 48 y o  female DCD:[birth year] EQY:[id number]         Date:  8/5/2020    Assessment / Plan:  A 53 year old premenopausal woman with locally advanced right breast cancer, ER/[place] negative HER-2 3+ disease, diagnosed in July 2011  She underwent neoadjuvant chemotherapy with AC followed by paclitaxel and Herceptin resulting in complete pathological response followed by lumpectomy with axillary lymph node dissection, resulting in the CLEMENTINE    She has no evidence of recurrence of breast cancer  In April 2020, she was diagnosed with stage IIIB rectal cancer, status post AP resection resulting in CLEMENTINE  She has 6 positive lymph nodes  She received 2 doses of adjuvant chemotherapy with FOLFOX-6 with poor tolerance  Therefore, she switch her adjuvant chemotherapy to XEROX with some improvement of tolerance  She still have multiple complaints with constipation, dizziness as well as fatigue  I recommended her to discontinue Zofran which is likely to cause constipation  I told her not to take ondansetron p r n   Instead, I would add Ativan 0 5 mg as premedication  Her systemic chemotherapy will stay the same  She is in agreement with my recommendation  I will see her again in 3 weeks for follow-up          Subjective:      HPI:             Interval History:  A 48year-old premenopausal woman with locally advanced right breast cancer with ER/AZ negative HER-2 3+ disease diagnosed in July 2011   She underwent neoadjuvant chemotherapy with AC followed by paclitaxel and Herceptin resulting in complete pathological response   She has no evidence of recurrence of breast cancer, to date   She recently noticed some occasional rectal bleeding   She underwent 1st colonoscopy which showed a rectal mass, 11 cm from the anal verge   Biopsy showed adenocarcinoma   Based on the MRI of the pelvis, this was thought to be stage II with no enlarged adjacent lymphadenopathy  Brendan Rivera underwent surgical resection in April 30, 2020  Pathology report showed 3 6 x 2 7 x 1 3 cm adenocarcinoma, grade 2  6/19 regional lymph nodes were positive for metastatic disease   Her preoperative CEA was less than 0 5   CT scan of chest abdomen pelvis showed no evidence of distant metastasis   Her tumor showed no loss of MMR protein  She underwent 2 cycle of FOLFOX as adjuvant chemotherapy with very poor tolerance  Therefore, she switched her adjuvant chemotherapy to [id number]  She presents today for the 2nd cycle of XEROX  She has a complaint of constipation which lasted nearly 2 weeks  She had 1/3 twice of vomiting  She was very nauseous for 3 days after the oxaliplatin infusion  She has no history of neutropenic fever  She has no complaint of pain  Her weight is stable  She has normal performance status  She also has complaint of dizziness         Objective:      Primary Diagnosis:      1  Locally advanced right breast cancer, ER/[place] negative, HER-2 (3+) disease diagnosed in July 2011       2  BRCA mutation negative       3  Rectal cancer, stage IIIB (pT3, pN2, M0) grade 2 with no loss of MMR protein   Diagnosed in April 2020       Cancer Staging:  Cancer Staging  No matching staging information was found for the patient         Previous Hematologic/ Oncologic Treatment:      1  Neoadjuvant chemotherapy with dose-dense AC x4 followed by weekly paclitaxel and Herceptin x12    2  Adjuvant Herceptin 6 mg/kg x13 cycles, completed in December 2012    3  Adjuvant chemotherapy with FOLFOX-6 x 2 doses, completed in June 2020       Current Hematologic/ Oncologic Treatment:       Adjuvant chemotherapy with XELOX every 3 weeks x7 cycles    2nd cycle of 0 up to be started tomorrow      Disease Status:      CLEMENTINE status post AP resection      Test Results:     Pathology:     Rectum shows 3 6 x 2 7 x 1 3 cm of adenocarcinoma, grade 2 with depth of invasion T3   6/19 regional lymph node were positive for metastatic disease   Stage IIIB (pT3, pN2, M0) no loss of MMR protein      Radiology:     CT scan of chest abdomen pelvis showed no evidence of metastatic disease      Laboratory:     CEA less than 0 5      Physical Exam:        General Appearance:    Alert, oriented          Eyes:    PERRL   Ears:    Normal external ear canals, both ears   Nose:   Nares normal, septum midline   Throat:   Mucosa moist  Pharynx without injection  Neck:   Supple         Lungs:     Clear to auscultation bilaterally   Chest Wall:    No tenderness or deformity    Heart:    Regular rate and rhythm         Abdomen:     Soft, non-tender, bowel sounds +, no organomegaly               Extremities:   Extremities no cyanosis or edema         Skin:   no rash or icterus  Lymph nodes:   Cervical, supraclavicular, and axillary nodes normal   Neurologic:   CNII-XII intact, normal strength, sensation and reflexes     Throughout             Breast exam:   NA           ROS: Review of Systems   Gastrointestinal:        Constipation followed by diarrhea  Neurological:        Dizziness   All other systems reviewed and are negative  Imaging: No results found  Labs:   Lab Results   Component Value Date    WBC 4 65 08/03/2020    HGB 13 1 08/03/2020    HCT 41 0 08/03/2020    MCV 97 08/03/2020     08/03/2020     Lab Results   Component Value Date    K 4 0 08/03/2020     08/03/2020    CO2 29 08/03/2020    BUN 12 08/03/2020    CREATININE 0 72 08/03/2020    GLUF 95 08/03/2020    CALCIUM 9 6 08/03/2020    AST 28 08/03/2020    ALT 45 08/03/2020    ALKPHOS 79 08/03/2020    EGFR 98 08/03/2020         Lab Results   Component Value Date    CEA <0 5 03/18/2020         Current Medications: Reviewed  Allergies: Reviewed  PMH/FH/SH:  Reviewed      Vital Sign:    Body surface area is 1 55 meters squared      Wt Readings from Last 3 Encounters:   08/05/20 58 3 kg (128 lb 8 oz)   07/16/20 58 5 kg (128 lb 15 5 oz)   06/24/20 57 kg (125 lb 10 6 oz) Temp Readings from Last 3 Encounters:   08/05/20 97 7 °F (36 5 °C) (Tympanic Core)   07/16/20 (!) 97 1 °F (36 2 °C) (Temporal)   06/24/20 97 5 °F (36 4 °C) (Tympanic Core)        BP Readings from Last 3 Encounters:   08/05/20 124/78   07/16/20 127/58   06/24/20 132/78         Pulse Readings from Last 3 Encounters:   08/05/20 79   07/16/20 90   06/24/20 95     @LASTSAO2(3)@

## 2020-08-06 ENCOUNTER — HOSPITAL ENCOUNTER (OUTPATIENT)
Dept: INFUSION CENTER | Facility: HOSPITAL | Age: 50
Discharge: HOME | End: 2020-08-06
Attending: INTERNAL MEDICINE
Payer: COMMERCIAL

## 2020-08-06 ENCOUNTER — HOSPITAL ENCOUNTER (OUTPATIENT)
Dept: INFUSION CENTER | Facility: HOSPITAL | Age: 50
Discharge: HOME | End: 2020-08-06
Attending: INTERNAL MEDICINE

## 2020-08-06 VITALS
TEMPERATURE: 97.6 F | RESPIRATION RATE: 18 BRPM | HEART RATE: 89 BPM | WEIGHT: 129.63 LBS | BODY MASS INDEX: 25.45 KG/M2 | SYSTOLIC BLOOD PRESSURE: 127 MMHG | DIASTOLIC BLOOD PRESSURE: 67 MMHG | HEIGHT: 60 IN

## 2020-08-06 DIAGNOSIS — C20 RECTAL CANCER (HCC): Primary | ICD-10-CM

## 2020-08-06 PROCEDURE — 96413 CHEMO IV INFUSION 1 HR: CPT

## 2020-08-06 PROCEDURE — 96415 CHEMO IV INFUSION ADDL HR: CPT

## 2020-08-06 PROCEDURE — 96367 TX/PROPH/DG ADDL SEQ IV INF: CPT

## 2020-08-06 PROCEDURE — 96375 TX/PRO/DX INJ NEW DRUG ADDON: CPT

## 2020-08-06 RX ORDER — SODIUM CHLORIDE 9 MG/ML
20 INJECTION, SOLUTION INTRAVENOUS ONCE AS NEEDED
Status: DISCONTINUED | OUTPATIENT
Start: 2020-08-06 | End: 2020-08-09 | Stop reason: HOSPADM

## 2020-08-06 RX ORDER — PROCHLORPERAZINE MALEATE 10 MG
10 TABLET ORAL ONCE
Status: DISCONTINUED | OUTPATIENT
Start: 2020-08-06 | End: 2020-08-09 | Stop reason: HOSPADM

## 2020-08-06 RX ORDER — DEXTROSE MONOHYDRATE 50 MG/ML
20 INJECTION, SOLUTION INTRAVENOUS ONCE
Status: COMPLETED | OUTPATIENT
Start: 2020-08-06 | End: 2020-08-06

## 2020-08-06 RX ORDER — LORAZEPAM 2 MG/ML
0.5 INJECTION INTRAMUSCULAR ONCE
Status: COMPLETED | OUTPATIENT
Start: 2020-08-06 | End: 2020-08-06

## 2020-08-06 RX ORDER — PROCHLORPERAZINE MALEATE 10 MG
10 TABLET ORAL ONCE
Status: CANCELLED
Start: 2020-08-06

## 2020-08-06 RX ADMIN — LORAZEPAM 0.5 MG: 2 INJECTION INTRAMUSCULAR; INTRAVENOUS at 09:12

## 2020-08-06 RX ADMIN — SODIUM CHLORIDE 150 MG: 0.9 INJECTION, SOLUTION INTRAVENOUS at 12:23

## 2020-08-06 RX ADMIN — DEXAMETHASONE SODIUM PHOSPHATE 10 MG: 10 INJECTION, SOLUTION INTRAMUSCULAR; INTRAVENOUS at 09:16

## 2020-08-06 RX ADMIN — OXALIPLATIN 130.05 MG: 5 INJECTION, SOLUTION INTRAVENOUS at 09:54

## 2020-08-06 RX ADMIN — SODIUM CHLORIDE 20 ML/HR: 0.9 INJECTION, SOLUTION INTRAVENOUS at 09:10

## 2020-08-06 RX ADMIN — ONDANSETRON 8 MG: 2 INJECTION INTRAMUSCULAR; INTRAVENOUS at 12:54

## 2020-08-06 RX ADMIN — DEXTROSE 20 ML/HR: 50 INJECTION, SOLUTION INTRAVENOUS at 09:48

## 2020-08-06 NOTE — PROGRESS NOTES
Pt had 92ml left on oxaliplatin when reported having thrown up 3 times in bathroom  Before able to get to the seat pt said she will throw up again  Pt continues to throw up consistently  Notified Oli Parker Rn notified  Emend ordered and hung, unable to give ordered compazine d/t anything that is being taken in comes right back up  Notified Stanislav Mojica again, IV push Zofran added  Also discussed need for xfr to ER if intractable vomiting does not subside  Pt opted to not restart chemo today for fear of adding to the vomiting issue  @[id number] after emend and zofran, vomiting resolved, pt reports feeling much better  D/w serena, zofran and emend to be added moving forward  Pt will take compazine as needed at home  AVS supplied

## 2020-08-24 RX ORDER — LORAZEPAM 2 MG/ML
0.5 INJECTION INTRAMUSCULAR ONCE
Status: CANCELLED
Start: 2020-08-27

## 2020-08-25 ENCOUNTER — APPOINTMENT (OUTPATIENT)
Dept: LAB | Facility: CLINIC | Age: 50
End: 2020-08-25
Payer: COMMERCIAL

## 2020-08-25 DIAGNOSIS — C20 RECTAL CANCER (HCC): ICD-10-CM

## 2020-08-25 LAB
ALBUMIN SERPL BCP-MCNC: 3.6 G/DL (ref 3.5–5)
ALP SERPL-CCNC: 94 U/L (ref 46–116)
ALT SERPL W P-5'-P-CCNC: 77 U/L (ref 12–78)
ANION GAP SERPL CALCULATED.3IONS-SCNC: 3 MMOL/L (ref 4–13)
AST SERPL W P-5'-P-CCNC: 30 U/L (ref 5–45)
BASOPHILS # BLD AUTO: 0.03 THOUSANDS/ΜL (ref 0–0.1)
BASOPHILS NFR BLD AUTO: 1 % (ref 0–1)
BILIRUB SERPL-MCNC: 0.55 MG/DL (ref 0.2–1)
BUN SERPL-MCNC: 8 MG/DL (ref 5–25)
CALCIUM SERPL-MCNC: 9.3 MG/DL (ref 8.3–10.1)
CHLORIDE SERPL-SCNC: 105 MMOL/L (ref 100–108)
CO2 SERPL-SCNC: 31 MMOL/L (ref 21–32)
CREAT SERPL-MCNC: 0.66 MG/DL (ref 0.6–1.3)
EOSINOPHIL # BLD AUTO: 0.09 THOUSAND/ΜL (ref 0–0.61)
EOSINOPHIL NFR BLD AUTO: 2 % (ref 0–6)
ERYTHROCYTE [DISTWIDTH] IN BLOOD BY AUTOMATED COUNT: 18.4 % (ref 11.6–15.1)
GFR SERPL CREATININE-BSD FRML MDRD: 103 ML/MIN/1.73SQ M
GLUCOSE P FAST SERPL-MCNC: 90 MG/DL (ref 65–99)
HCT VFR BLD AUTO: 41.2 % (ref 34.8–46.1)
HGB BLD-MCNC: 13.1 G/DL (ref 11.5–15.4)
IMM GRANULOCYTES # BLD AUTO: 0.01 THOUSAND/UL (ref 0–0.2)
IMM GRANULOCYTES NFR BLD AUTO: 0 % (ref 0–2)
LYMPHOCYTES # BLD AUTO: 1.59 THOUSANDS/ΜL (ref 0.6–4.47)
LYMPHOCYTES NFR BLD AUTO: 32 % (ref 14–44)
MCH RBC QN AUTO: 32.6 PG (ref 26.8–34.3)
MCHC RBC AUTO-ENTMCNC: 31.8 G/DL (ref 31.4–37.4)
MCV RBC AUTO: 103 FL (ref 82–98)
MONOCYTES # BLD AUTO: 0.64 THOUSAND/ΜL (ref 0.17–1.22)
MONOCYTES NFR BLD AUTO: 13 % (ref 4–12)
NEUTROPHILS # BLD AUTO: 2.55 THOUSANDS/ΜL (ref 1.85–7.62)
NEUTS SEG NFR BLD AUTO: 52 % (ref 43–75)
NRBC BLD AUTO-RTO: 0 /100 WBCS
PLATELET # BLD AUTO: 306 THOUSANDS/UL (ref 149–390)
PMV BLD AUTO: 10.6 FL (ref 8.9–12.7)
POTASSIUM SERPL-SCNC: 3.7 MMOL/L (ref 3.5–5.3)
PROT SERPL-MCNC: 7.4 G/DL (ref 6.4–8.2)
RBC # BLD AUTO: 4.02 MILLION/UL (ref 3.81–5.12)
SODIUM SERPL-SCNC: 139 MMOL/L (ref 136–145)
WBC # BLD AUTO: 4.91 THOUSAND/UL (ref 4.31–10.16)

## 2020-08-25 PROCEDURE — 85025 COMPLETE CBC W/AUTO DIFF WBC: CPT

## 2020-08-25 PROCEDURE — 80053 COMPREHEN METABOLIC PANEL: CPT

## 2020-08-25 PROCEDURE — 36415 COLL VENOUS BLD VENIPUNCTURE: CPT

## 2020-08-27 ENCOUNTER — HOSPITAL ENCOUNTER (OUTPATIENT)
Dept: INFUSION CENTER | Facility: HOSPITAL | Age: 50
Discharge: HOME | End: 2020-08-27
Attending: INTERNAL MEDICINE
Payer: COMMERCIAL

## 2020-08-27 VITALS
OXYGEN SATURATION: 100 % | HEART RATE: 69 BPM | BODY MASS INDEX: 25.84 KG/M2 | TEMPERATURE: 97 F | HEIGHT: 60 IN | SYSTOLIC BLOOD PRESSURE: 115 MMHG | WEIGHT: 131.61 LBS | DIASTOLIC BLOOD PRESSURE: 65 MMHG | RESPIRATION RATE: 16 BRPM

## 2020-08-27 DIAGNOSIS — C20 RECTAL CANCER (HCC): Primary | ICD-10-CM

## 2020-08-27 PROCEDURE — 96415 CHEMO IV INFUSION ADDL HR: CPT

## 2020-08-27 PROCEDURE — 96367 TX/PROPH/DG ADDL SEQ IV INF: CPT

## 2020-08-27 PROCEDURE — 96413 CHEMO IV INFUSION 1 HR: CPT

## 2020-08-27 PROCEDURE — 96375 TX/PRO/DX INJ NEW DRUG ADDON: CPT

## 2020-08-27 RX ORDER — SODIUM CHLORIDE 9 MG/ML
20 INJECTION, SOLUTION INTRAVENOUS ONCE AS NEEDED
Status: DISCONTINUED | OUTPATIENT
Start: 2020-08-27 | End: 2020-08-30 | Stop reason: HOSPADM

## 2020-08-27 RX ORDER — DEXTROSE MONOHYDRATE 50 MG/ML
20 INJECTION, SOLUTION INTRAVENOUS ONCE
Status: COMPLETED | OUTPATIENT
Start: 2020-08-27 | End: 2020-08-27

## 2020-08-27 RX ORDER — LORAZEPAM 2 MG/ML
0.5 INJECTION INTRAMUSCULAR ONCE
Status: COMPLETED | OUTPATIENT
Start: 2020-08-27 | End: 2020-08-27

## 2020-08-27 RX ADMIN — LORAZEPAM 0.5 MG: 2 INJECTION INTRAMUSCULAR; INTRAVENOUS at 09:24

## 2020-08-27 RX ADMIN — SODIUM CHLORIDE 20 ML/HR: 0.9 INJECTION, SOLUTION INTRAVENOUS at 08:55

## 2020-08-27 RX ADMIN — ONDANSETRON 8 MG: 2 INJECTION INTRAMUSCULAR; INTRAVENOUS at 08:56

## 2020-08-27 RX ADMIN — DEXAMETHASONE SODIUM PHOSPHATE 10 MG: 10 INJECTION, SOLUTION INTRAMUSCULAR; INTRAVENOUS at 09:27

## 2020-08-27 RX ADMIN — DEXTROSE 20 ML/HR: 50 INJECTION, SOLUTION INTRAVENOUS at 10:43

## 2020-08-27 RX ADMIN — OXALIPLATIN 130.05 MG: 5 INJECTION, SOLUTION INTRAVENOUS at 10:44

## 2020-08-27 RX ADMIN — SODIUM CHLORIDE 150 MG: 0.9 INJECTION, SOLUTION INTRAVENOUS at 10:06

## 2020-08-31 ENCOUNTER — TELEPHONE (OUTPATIENT)
Dept: HEMATOLOGY ONCOLOGY | Facility: CLINIC | Age: 50
End: 2020-08-31

## 2020-09-01 ENCOUNTER — OFFICE VISIT (OUTPATIENT)
Dept: HEMATOLOGY ONCOLOGY | Facility: CLINIC | Age: 50
End: 2020-09-01
Payer: COMMERCIAL

## 2020-09-01 VITALS
OXYGEN SATURATION: 99 % | RESPIRATION RATE: 18 BRPM | BODY MASS INDEX: 25.72 KG/M2 | HEIGHT: 60 IN | DIASTOLIC BLOOD PRESSURE: 70 MMHG | TEMPERATURE: 97.8 F | SYSTOLIC BLOOD PRESSURE: 116 MMHG | HEART RATE: 83 BPM | WEIGHT: 131 LBS

## 2020-09-01 DIAGNOSIS — R11.0 CHEMOTHERAPY-INDUCED NAUSEA: Primary | ICD-10-CM

## 2020-09-01 DIAGNOSIS — T45.1X5A CHEMOTHERAPY-INDUCED NAUSEA: Primary | ICD-10-CM

## 2020-09-01 DIAGNOSIS — C20 RECTAL CANCER (HCC): Primary | ICD-10-CM

## 2020-09-01 PROCEDURE — 99215 OFFICE O/P EST HI 40 MIN: CPT | Performed by: INTERNAL MEDICINE

## 2020-09-01 RX ORDER — SODIUM CHLORIDE 9 MG/ML
20 INJECTION, SOLUTION INTRAVENOUS ONCE AS NEEDED
Status: CANCELLED | OUTPATIENT
Start: 2020-10-29

## 2020-09-01 RX ORDER — LORAZEPAM 2 MG/ML
0.5 INJECTION INTRAMUSCULAR ONCE
Status: CANCELLED
Start: 2020-09-17

## 2020-09-01 RX ORDER — PROCHLORPERAZINE MALEATE 10 MG
10 TABLET ORAL EVERY 6 HOURS PRN
Qty: 30 TABLET | Refills: 0 | Status: SHIPPED | OUTPATIENT
Start: 2020-09-01 | End: 2020-10-01 | Stop reason: SDUPTHER

## 2020-09-01 RX ORDER — DEXTROSE MONOHYDRATE 50 MG/ML
20 INJECTION, SOLUTION INTRAVENOUS ONCE
Status: CANCELLED | OUTPATIENT
Start: 2020-10-29

## 2020-09-01 RX ORDER — LORAZEPAM 2 MG/ML
0.5 INJECTION INTRAMUSCULAR ONCE
Status: CANCELLED
Start: 2020-10-08

## 2020-09-01 RX ORDER — LORAZEPAM 2 MG/ML
0.5 INJECTION INTRAMUSCULAR ONCE
Status: CANCELLED
Start: 2020-10-29

## 2020-09-01 RX ORDER — SODIUM CHLORIDE 9 MG/ML
20 INJECTION, SOLUTION INTRAVENOUS ONCE AS NEEDED
Status: CANCELLED | OUTPATIENT
Start: 2020-10-08

## 2020-09-01 RX ORDER — DEXTROSE MONOHYDRATE 50 MG/ML
20 INJECTION, SOLUTION INTRAVENOUS ONCE
Status: CANCELLED | OUTPATIENT
Start: 2020-10-08

## 2020-09-01 NOTE — PROGRESS NOTES
Hematology / Oncology Outpatient Follow Up Note    Iliana Godoy 48 y o  female [facility]:[birth year] YGY:[id number]         Date:  9/1/2020    Assessment / Plan:  A 53 year old premenopausal woman with locally advanced right breast cancer, ER/[place] negative HER-2 3+ disease, diagnosed in July 2011  She underwent neoadjuvant chemotherapy with AC followed by paclitaxel and Herceptin resulting in complete pathological response followed by lumpectomy with axillary lymph node dissection, resulting in the CLEMENTINE    She has no evidence of recurrence of breast cancer   In April 2020, she was diagnosed with stage IIIB rectal cancer, status post AP resection resulting in CLEMENTINE   She has 6 positive lymph nodes   She received 2 doses of adjuvant chemotherapy with FOLFOX-6 with poor tolerance  subsequently, she tried 3 cycles of XELOX with very poor tolerance due to the oxaliplatin  We discussed further management of adjuvant chemotherapy  With further treatment with a without oxaliplatin probably crit is less than 5% difference in terms of disease-free survival   She feel most comfortable to discontinue oxaliplatin and continue with capecitabine 1500 b i d  2 weeks on followed by 1 week off until December 2020  I will see her again in 2 months with CBC, CMP and CEA  She is in agreement with my recommendations              Subjective:      HPI:             Interval History:  A 48year-old premenopausal woman with locally advanced right breast cancer with ER/IA negative HER-2 3+ disease diagnosed in July 2011   She underwent neoadjuvant chemotherapy with AC followed by paclitaxel and Herceptin resulting in complete pathological response   She has no evidence of recurrence of breast cancer, to date   She recently noticed some occasional rectal bleeding   She underwent 1st colonoscopy which showed a rectal mass, 11 cm from the anal verge   Biopsy showed adenocarcinoma   Based on the MRI of the pelvis, this was thought to be stage II with no enlarged adjacent lymphadenopathy   She underwent surgical resection in April 30, 2020  Pathology report showed 3 6 x 2 7 x 1 3 cm adenocarcinoma, grade 2  6/19 regional lymph nodes were positive for metastatic disease   Her preoperative CEA was less than 0 5   CT scan of chest abdomen pelvis showed no evidence of distant metastasis   Her tumor showed no loss of MMR protein  She underwent 2 cycle of FOLFOX as adjuvant chemotherapy with very poor tolerance  Therefore, she switched her adjuvant chemotherapy to XELOX  She had 3 cycle of XELOX with very poor tolerance mainly due to the oxaliplatin infusion  She is tolerating capecitabine relatively well with mild to moderate hand-foot reaction  She has no diarrhea  Her weight is stable  She is afebrile  She has no complaint of pain  Her performance status is normal       Objective:      Primary Diagnosis:      1  Locally advanced right breast cancer, ER/[place] negative, HER-2 (3+) disease diagnosed in July 2011       2  BRCA mutation negative       3  Rectal cancer, stage IIIB (pT3, pN2, M0) grade 2 with no loss of MMR protein   Diagnosed in April 2020       Cancer Staging:  Cancer Staging  No matching staging information was found for the patient         Previous Hematologic/ Oncologic Treatment:      1   Neoadjuvant chemotherapy with dose-dense AC x4 followed by weekly paclitaxel and Herceptin x12    2  Adjuvant Herceptin 6 mg/kg x13 cycles, completed in December 2012    3  Adjuvant chemotherapy with FOLFOX-6 x 2 doses, completed in June 2020    4  Adjuvant chemotherapy with XELOX x3 cycles, completed in August 2020       Current Hematologic/ Oncologic Treatment:       Adjuvant chemotherapy with capecitabine to be continued until beginning of December 2020         Disease Status:      CLEMENTINE status post AP resection      Test Results:     Pathology:     Rectum shows 3 6 x 2 7 x 1 3 cm of adenocarcinoma, grade 2 with depth of invasion T3   6/19 regional lymph node were positive for metastatic disease   Stage IIIB (pT3, pN2, M0) no loss of MMR protein      Radiology:     CT scan of chest abdomen pelvis showed no evidence of metastatic disease      Laboratory:     CEA less than 0 5      Physical Exam:        General Appearance:    Alert, oriented          Eyes:    PERRL   Ears:    Normal external ear canals, both ears   Nose:   Nares normal, septum midline   Throat:   Mucosa moist  Pharynx without injection  Neck:   Supple         Lungs:     Clear to auscultation bilaterally   Chest Wall:    No tenderness or deformity    Heart:    Regular rate and rhythm         Abdomen:     Soft, non-tender, bowel sounds +, no organomegaly               Extremities:   Extremities no cyanosis or edema         Skin:   no rash or icterus  Lymph nodes:   Cervical, supraclavicular, and axillary nodes normal   Neurologic:   CNII-XII intact, normal strength, sensation and reflexes     Throughout             Breast exam:   NA           ROS: Review of Systems   Skin:        Mild to moderate hand-foot reaction   All other systems reviewed and are negative  Imaging: No results found  Labs:   Lab Results   Component Value Date    WBC 4 91 08/25/2020    HGB 13 1 08/25/2020    HCT 41 2 08/25/2020     (H) 08/25/2020     08/25/2020     Lab Results   Component Value Date    K 3 7 08/25/2020     08/25/2020    CO2 31 08/25/2020    BUN 8 08/25/2020    CREATININE 0 66 08/25/2020    GLUF 90 08/25/2020    CALCIUM 9 3 08/25/2020    AST 30 08/25/2020    ALT 77 08/25/2020    ALKPHOS 94 08/25/2020    EGFR 103 08/25/2020         Lab Results   Component Value Date    CEA <0 5 03/18/2020         Current Medications: Reviewed  Allergies: Reviewed  PMH/FH/SH:  Reviewed      Vital Sign:    Body surface area is 1 56 meters squared      Wt Readings from Last 3 Encounters:   09/01/20 59 4 kg (131 lb)   08/27/20 59 7 kg (131 lb 9 8 oz)   08/06/20 58 8 kg (129 lb 10 1 oz)        Temp Readings from Last 3 Encounters:   09/01/20 97 8 °F (36 6 °C) (Tympanic Core)   08/27/20 (!) 97 °F (36 1 °C) (Temporal)   08/06/20 97 6 °F (36 4 °C) (Tympanic)        BP Readings from Last 3 Encounters:   09/01/20 116/70   08/27/20 115/65   08/06/20 127/67         Pulse Readings from Last 3 Encounters:   09/01/20 83   08/27/20 69   08/06/20 89     @LASTSAO2(3)@

## 2020-09-14 ENCOUNTER — HOSPITAL ENCOUNTER (OUTPATIENT)
Dept: MAMMOGRAPHY | Facility: HOSPITAL | Age: 50
Discharge: HOME | End: 2020-09-14
Payer: COMMERCIAL

## 2020-09-14 VITALS — HEIGHT: 60 IN | WEIGHT: 131 LBS | BODY MASS INDEX: 25.72 KG/M2

## 2020-09-14 DIAGNOSIS — Z12.31 VISIT FOR SCREENING MAMMOGRAM: ICD-10-CM

## 2020-09-14 PROCEDURE — 77063 BREAST TOMOSYNTHESIS BI: CPT

## 2020-09-14 PROCEDURE — 77067 SCR MAMMO BI INCL CAD: CPT

## 2020-09-15 ENCOUNTER — OFFICE VISIT (OUTPATIENT)
Dept: SURGICAL ONCOLOGY | Facility: CLINIC | Age: 50
End: 2020-09-15
Payer: COMMERCIAL

## 2020-09-15 VITALS
BODY MASS INDEX: 25.62 KG/M2 | SYSTOLIC BLOOD PRESSURE: 110 MMHG | HEIGHT: 60 IN | WEIGHT: 130.5 LBS | RESPIRATION RATE: 16 BRPM | HEART RATE: 58 BPM | TEMPERATURE: 97.4 F | DIASTOLIC BLOOD PRESSURE: 82 MMHG

## 2020-09-15 DIAGNOSIS — Z08 ENCOUNTER FOR FOLLOW-UP EXAMINATION AFTER COMPLETED TREATMENT FOR MALIGNANT NEOPLASM: Primary | ICD-10-CM

## 2020-09-15 DIAGNOSIS — Z85.3 HISTORY OF RIGHT BREAST CANCER: ICD-10-CM

## 2020-09-15 DIAGNOSIS — Z12.31 VISIT FOR SCREENING MAMMOGRAM: ICD-10-CM

## 2020-09-15 DIAGNOSIS — C20 RECTAL CANCER (HCC): ICD-10-CM

## 2020-09-15 PROCEDURE — 99213 OFFICE O/P EST LOW 20 MIN: CPT | Performed by: NURSE PRACTITIONER

## 2020-09-15 NOTE — PROGRESS NOTES
Surgical Oncology Follow Up       [address] [facility] SURGICAL ONCOLOGY [place]  [address] [facility] [address]    Alysha Lopes  1970  [id number]  [address] [facility] [place]  [address] [facility] [address]    Chief Complaint   Patient presents with    Breast Cancer     1 year follow up        Assessment/Plan:  1  Encounter for follow-up examination after completed treatment for malignant neoplasm    2  History of right breast cancer  - 1 year f/u visit    3  Visit for screening mammogram  - Mammo screening bilateral w 3d & cad; Future    Discussion/Summary: Patient is a 63-year-old female who presents today for a one-year follow-up for right breast cancer diagnosed in July 2011  Her pathology revealed invasive ductal carcinoma, ER negative, MO negative, HER-2 positive  She underwent neoadjuvant chemotherapy (AC x 4, Paclitaxel, Herceptin) with a complete pathological response  She then underwent a right lumpectomy and right axillary dissection by Dr Ponce Barnes completed radiation therapy  She tested negative for the BRCA mutation  She had a bilateral mammogram performed yesterday which was BI-RADS 2, category 2 density  She was diagnosed with Stage III rectal cancer in 3/2020 and underwent surgery with Dr Ramo Reina and adjuvant chemotherapy and is currently taking capecitabine  She states that she has had difficulty tolerating the adjuvant medical therapy  She will continue close follow-up with her medical oncologist   She did repeat her genetic testing which was negative  I will order a bilateral mammogram for next year and I will plan to see her back in 1 year or sooner should the need arise  She was instructed to call with any new concerns or symptoms prior to that time  All of her questions were answered today      History of Present Illness:     Oncology History   Diagnosis and Staging: July 2011: Locally advanced right breast invasive ductal carcinoma, Stage III, complete pathologic response 2012negative, OK negative, HER-2 positive, Mammaprint high-risk      Treatment History: 2011: Neoadjuvant dose dense Adriamycin and Cytoxan followed by paclitaxel followed by Herceptin  2011: Right lumpectomy with axillary dissection      Current Therapy: Observation      History of right breast cancer   Rectal cancer ([facility] [address] )   3/24/2020 Initial Diagnosis    Rectal cancer ([facility] [address] )     6/3/2020 - 6/29/2020 Chemotherapy    fluorouracil (ADRUCIL) injection 610 mg, 400 mg/m2 = 610 mg, Intravenous, Once, 2 of 12 cycles  Administration: 610 mg (6/3/2020), 610 mg (6/17/2020)  leucovorin 600 mg in dextrose 5 % 250 mL IVPB, 608 mg, Intravenous, Once, 2 of 12 cycles  Administration: 600 mg (6/3/2020), 600 mg (6/17/2020)  oxaliplatin (ELOXATIN) 129 2 mg in dextrose 5 % 250 mL chemo infusion, 85 mg/m2 = 129 2 mg, Intravenous, Once, 2 of 12 cycles  Administration: 129 2 mg (6/3/2020), 129 2 mg (6/17/2020)     7/16/2020 - 9/16/2020 Chemotherapy    fosaprepitant (EMEND) 150 mg in sodium chloride 0 9 % 250 mL IVPB, 150 mg, Intravenous, Once, 2 of 6 cycles  Administration: 150 mg (8/6/2020), 150 mg (8/27/2020)  oxaliplatin (ELOXATIN) 130 05 mg in dextrose 5 % 500 mL chemo infusion, 85 mg/m2 = 130 05 mg (65 4 % of original dose 130 mg/m2), Intravenous, Once, 3 of 7 cycles  Dose modification: 85 mg/m2 (original dose 130 mg/m2, Cycle 1, Reason: Dose Not Tolerated)  Administration: 130 05 mg (7/16/2020), 130 05 mg (8/6/2020), 130 05 mg (8/27/2020)          -Interval History:  Patient presents today for a follow-up visit for breast cancer diagnosed in 2011  She notices no changes on her self breast exam  She was diagnosed with Stage III rectal cancer in March of 2020 and underwent surgery and chemotherapy and is currently taking capecitabine  She has had difficulty tolerating the therapy    She denies headaches, back pain, bone pain, cough, shortness of breath  She does report GI issues from therapy  She has met with oncology dietitians  She had a bilateral mammogram yesterday which was BI-RADS 2, category 2 density  Review of Systems:  Review of Systems   Constitutional: Negative for activity change, appetite change, chills, fatigue, fever and unexpected weight change  HENT: Negative for trouble swallowing  Eyes: Negative for pain, redness and visual disturbance  Respiratory: Negative for cough, shortness of breath and wheezing  Cardiovascular: Negative for chest pain, palpitations and leg swelling  Gastrointestinal: Positive for diarrhea and nausea  Negative for abdominal pain, constipation and vomiting  Endocrine: Negative for cold intolerance and heat intolerance  Musculoskeletal: Negative for arthralgias, back pain, gait problem and myalgias  Skin: Negative for color change and rash  Neurological: Negative for dizziness, syncope, light-headedness, numbness and headaches  Hematological: Negative for adenopathy  Psychiatric/Behavioral: Negative for agitation and confusion  All other systems reviewed and are negative  Patient Active Problem List   Diagnosis    History of right breast cancer    Cervical dysplasia    Leiomyoma of uterus    Lichen sclerosus et atrophicus    Contusion of right chest wall    Encounter for follow-up examination after completed treatment for malignant neoplasm    Bacterial vaginitis    Rectal cancer ([facility] [address] )     Past Medical History:   Diagnosis Date    Atrophic vaginitis     Bacterial vaginitis     BRCA1 negative     BRCA2 negative     Carcinoma of right breast in female, estrogen receptor negative ([address] )     Upper outer quadrant   Resolved 2017     History of radiation therapy     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)     Malignant neoplasm metastasis to lymph nodes     Missed      Resolved 2015     PONV (postoperative nausea and vomiting)     Rectal cancer [facility])      Past Surgical History:   Procedure Laterality Date    AXILLARY NODE DISSECTION      BREAST BIOPSY Right 2012    BREAST LUMPECTOMY Right     BREAST LUMPECTOMY Right 02/07/2012    COLONOSCOPY      HYSTERECTOMY  2012    IR PORT PLACEMENT  5/29/2020    LAPAROSCOPIC TOTAL HYSTERECTOMY      OTHER SURGICAL HISTORY      Chemotherapeutics     [place] LAP,SURG,COLECTOMY, PARTIAL, W/ANAST N/A 4/30/2020    Procedure: DIAGNOSTIC LAPAROSCOPY, RESECTION LOW ANTERIOR LAPAROSCOPIC, FLEXIBLE SIGMOIDOSCOPY, LASER FLUORESENCE ANGIOGRAPHY (SPY);  Surgeon: Arabella Moran MD;  [facility];  Service: Colorectal     Family History   Problem Relation Age of Onset    Coronary artery disease Mother     Colon polyps Mother         Sigmoid colon    Skin cancer Maternal Grandmother     Colon cancer Maternal Grandfather 67    Colon polyps Maternal Grandfather         Sigmoid colon     Coronary artery disease Paternal Grandfather     Diabetes Paternal Grandfather     Colon polyps Paternal Grandfather         Sigmoid colon    Colon polyps Father         Sigmoid colon    No Known Problems Sister      Social History     Socioeconomic History    Marital status: Single     Spouse name: Not on file    Number of children: Not on file    Years of education: Not on file    Highest education level: Not on file   Occupational History    Not on file   Social Needs    Financial resource strain: Not on file    Food insecurity     Worry: Not on file     Inability: Not on file    Transportation needs     Medical: Not on file     Non-medical: Not on file   Tobacco Use    Smoking status: Never Smoker    Smokeless tobacco: Never Used   Substance and Sexual Activity    Alcohol use: Yes     Frequency: Monthly or less    Drug use: No    Sexual activity: Yes     Comment: hysterectomy   Lifestyle    Physical activity     Days per week: Not on file     Minutes per session: Not on file    Stress: Not on file Relationships    Social connections     Talks on phone: Not on file     Gets together: Not on file     Attends Mu-ism service: Not on file     Active member of club or organization: Not on file     Attends meetings of clubs or organizations: Not on file     Relationship status: Not on file    Intimate partner violence     Fear of current or ex partner: Not on file     Emotionally abused: Not on file     Physically abused: Not on file     Forced sexual activity: Not on file   Other Topics Concern    Not on file   Social History Narrative    Not on file       Current Outpatient Medications:     al mag oxide-diphenhydramine-lidocaine viscous (MAGIC MOUTHWASH) 1:1:1 suspension, Swish and spit 10 mL every 4 (four) hours as needed for mouth pain or discomfort, Disp: 180 mL, Rfl: 0    Calcium Carbonate-Vit D-Min (CALCIUM 1200 PO), Take by mouth, Disp: , Rfl:     calcium polycarbophil (FIBERCON) 625 mg tablet, Take 625 mg by mouth daily, Disp: , Rfl:     capecitabine (XELODA) 500 MG tablet, Take 3 tablets (1,500 mg total) by mouth 2 (two) times a day For 2 weeks on then 1 week off , Disp: 84 tablet, Rfl: 3    cholecalciferol (VITAMIN D3) 1,000 units tablet, Take 1,000 Units by mouth daily, Disp: , Rfl:     CRANIAL PROSTHESIS, RX,, One wig as needed, Disp: 1 Piece, Rfl: 0    lidocaine-prilocaine (EMLA) cream, Apply topically as needed for mild pain, Disp: 30 g, Rfl: 0    LORazepam (ATIVAN) 0 5 mg tablet, Take 1 tablet (0 5 mg total) by mouth every 8 (eight) hours as needed (nausea), Disp: 30 tablet, Rfl: 0    metoclopramide (REGLAN) 10 mg tablet, Take 1 tablet (10 mg total) by mouth 4 (four) times a day, Disp: 30 tablet, Rfl: 1    Multiple Vitamin (MULTIVITAMIN) tablet, Take 1 tablet by mouth daily, Disp: , Rfl:     ondansetron (ZOFRAN) 4 mg tablet, Take 1 tablet (4 mg total) by mouth every 8 (eight) hours as needed for nausea or vomiting, Disp: 30 tablet, Rfl: 1    prochlorperazine (COMPAZINE) 10 mg tablet, Take 1 tablet (10 mg total) by mouth every 6 (six) hours as needed for nausea or vomiting, Disp: 30 tablet, Rfl: 0  Allergies   Allergen Reactions    Tetracyclines & Related GI Intolerance    Augmentin [Amoxicillin-Pot Clavulanate] GI Intolerance and Other (See Comments)     C-diff    Tetracycline GI Intolerance     Vitals:    09/15/20 0813   BP: 110/82   Pulse: 58   Resp: 16   Temp: (!) 97 4 °F (36 3 °C)       Physical Exam  Vitals signs reviewed  Constitutional:       General: She is not in acute distress  Appearance: Normal appearance  She is well-developed  She is not diaphoretic  HENT:      Head: Normocephalic and atraumatic  Neck:      Musculoskeletal: Normal range of motion  Cardiovascular:      Rate and Rhythm: Normal rate and regular rhythm  Heart sounds: Normal heart sounds  Pulmonary:      Effort: Pulmonary effort is normal       Breath sounds: Normal breath sounds  Chest:      Breasts:         Right: Skin change (right breast and right axillary surgical scars) present  No swelling, bleeding, inverted nipple, mass, nipple discharge or tenderness  Left: No swelling, bleeding, inverted nipple, mass, nipple discharge, skin change or tenderness  Abdominal:      Palpations: Abdomen is soft  There is no mass  Tenderness: There is no abdominal tenderness  Musculoskeletal: Normal range of motion  Lymphadenopathy:      Upper Body:      Right upper body: No supraclavicular or axillary adenopathy  Left upper body: No supraclavicular or axillary adenopathy  Skin:     General: Skin is warm and dry  Findings: No rash  Neurological:      Mental Status: She is alert and oriented to person, place, and time  Psychiatric:         Speech: Speech normal            Results:    Imaging  Mammo Screening Bilateral W 3d & Cad    Result Date: 9/14/2020  Narrative: DIAGNOSIS: Visit for screening mammogram TECHNIQUE: Digital screening mammography was performed  Computer Aided Detection (CAD) analyzed all applicable images  COMPARISONS: Prior breast imaging dated: 09/09/2019, 09/05/2018, 09/01/2017, 08/29/2016, 08/28/2015, 02/23/2015, 08/21/2014, 02/29/2014 and 08/09/2013  RELEVANT HISTORY: Family Breast Cancer History: No known family history of breast cancer  Family Medical History: Family medical history includes colon cancer in maternal grandfather  Personal History: No known relevant hormone history  Surgical history includes breast biopsy, lumpectomy, and hysterectomy  Medical history includes breast cancer, BRCA 1 negative, and BRCA 2 negative  The patient is scheduled in a reminder system for screening mammography  8-10% of cancers will be missed on mammography  Management of a palpable abnormality must be based on clinical grounds  Patients will be notified of their results via letter from our facility  Accredited by [employer] of [employer] and FDA  RISK ASSESSMENT: 5 Year Tyrer-Cuzick: No Score 10 Year Tyrer-Cuzick: No Score Lifetime Tyrer-Cuzick: No Score TISSUE DENSITY: There are scattered areas of fibroglandular density  INDICATION: Shara Valadez is a 48 y o  female presenting for screening mammography  FINDINGS: Bilateral There are no suspicious masses, grouped microcalcifications or areas of architectural distortion  The skin and nipple areolar complex are unremarkable  There are stable postsurgical changes in the right breast      Impression: No mammographic evidence of malignancy  ASSESSMENT/BI-RADS CATEGORY: Left: 2 - Benign Right: 2 - Benign Overall: 2 - Benign RECOMMENDATION:      - Routine screening mammogram in 1 year for both breasts  Workstation ID: [id number]       I reviewed the above imaging data  Advance Care Planning/Advance Directives:  Discussed disease status, cancer treatment plans and/or cancer treatment goals with the patient

## 2020-09-16 RX ORDER — CAPECITABINE 500 MG/1
TABLET, FILM COATED ORAL
Qty: 84 TABLET | Refills: 2 | Status: SHIPPED | OUTPATIENT
Start: 2020-09-16 | End: 2021-03-04 | Stop reason: ALTCHOICE

## 2020-10-01 DIAGNOSIS — R11.0 CHEMOTHERAPY-INDUCED NAUSEA: ICD-10-CM

## 2020-10-01 DIAGNOSIS — C20 RECTAL CANCER (HCC): ICD-10-CM

## 2020-10-01 DIAGNOSIS — T45.1X5A CHEMOTHERAPY-INDUCED NAUSEA: ICD-10-CM

## 2020-10-01 RX ORDER — ONDANSETRON 4 MG/1
4 TABLET, FILM COATED ORAL EVERY 8 HOURS PRN
Qty: 60 TABLET | Refills: 1 | Status: SHIPPED | OUTPATIENT
Start: 2020-10-01 | End: 2021-03-04 | Stop reason: ALTCHOICE

## 2020-10-01 RX ORDER — PROCHLORPERAZINE MALEATE 10 MG
10 TABLET ORAL EVERY 6 HOURS PRN
Qty: 30 TABLET | Refills: 1 | Status: SHIPPED | OUTPATIENT
Start: 2020-10-01 | End: 2020-10-20 | Stop reason: SDUPTHER

## 2020-10-20 DIAGNOSIS — R11.0 CHEMOTHERAPY-INDUCED NAUSEA: ICD-10-CM

## 2020-10-20 DIAGNOSIS — T45.1X5A CHEMOTHERAPY-INDUCED NAUSEA: ICD-10-CM

## 2020-10-20 RX ORDER — PROCHLORPERAZINE MALEATE 10 MG
10 TABLET ORAL EVERY 6 HOURS PRN
Qty: 30 TABLET | Refills: 1 | Status: SHIPPED | OUTPATIENT
Start: 2020-10-20 | End: 2021-03-04 | Stop reason: ALTCHOICE

## 2020-10-30 ENCOUNTER — LAB (OUTPATIENT)
Dept: LAB | Facility: CLINIC | Age: 50
End: 2020-10-30
Payer: COMMERCIAL

## 2020-10-30 DIAGNOSIS — C20 RECTAL CANCER (HCC): ICD-10-CM

## 2020-10-30 LAB
ALBUMIN SERPL BCP-MCNC: 4.1 G/DL (ref 3.5–5)
ALP SERPL-CCNC: 99 U/L (ref 46–116)
ALT SERPL W P-5'-P-CCNC: 58 U/L (ref 12–78)
ANION GAP SERPL CALCULATED.3IONS-SCNC: 4 MMOL/L (ref 4–13)
AST SERPL W P-5'-P-CCNC: 38 U/L (ref 5–45)
BASOPHILS # BLD AUTO: 0.02 THOUSANDS/ΜL (ref 0–0.1)
BASOPHILS NFR BLD AUTO: 1 % (ref 0–1)
BILIRUB SERPL-MCNC: 0.94 MG/DL (ref 0.2–1)
BUN SERPL-MCNC: 12 MG/DL (ref 5–25)
CALCIUM SERPL-MCNC: 9.3 MG/DL (ref 8.3–10.1)
CEA SERPL-MCNC: 1.8 NG/ML (ref 0–3)
CHLORIDE SERPL-SCNC: 104 MMOL/L (ref 100–108)
CO2 SERPL-SCNC: 31 MMOL/L (ref 21–32)
CREAT SERPL-MCNC: 0.74 MG/DL (ref 0.6–1.3)
EOSINOPHIL # BLD AUTO: 0.04 THOUSAND/ΜL (ref 0–0.61)
EOSINOPHIL NFR BLD AUTO: 1 % (ref 0–6)
ERYTHROCYTE [DISTWIDTH] IN BLOOD BY AUTOMATED COUNT: 15.1 % (ref 11.6–15.1)
GFR SERPL CREATININE-BSD FRML MDRD: 95 ML/MIN/1.73SQ M
GLUCOSE P FAST SERPL-MCNC: 86 MG/DL (ref 65–99)
HCT VFR BLD AUTO: 39.5 % (ref 34.8–46.1)
HGB BLD-MCNC: 13.3 G/DL (ref 11.5–15.4)
IMM GRANULOCYTES # BLD AUTO: 0.01 THOUSAND/UL (ref 0–0.2)
IMM GRANULOCYTES NFR BLD AUTO: 0 % (ref 0–2)
LYMPHOCYTES # BLD AUTO: 1.41 THOUSANDS/ΜL (ref 0.6–4.47)
LYMPHOCYTES NFR BLD AUTO: 35 % (ref 14–44)
MCH RBC QN AUTO: 36.5 PG (ref 26.8–34.3)
MCHC RBC AUTO-ENTMCNC: 33.7 G/DL (ref 31.4–37.4)
MCV RBC AUTO: 109 FL (ref 82–98)
MONOCYTES # BLD AUTO: 0.39 THOUSAND/ΜL (ref 0.17–1.22)
MONOCYTES NFR BLD AUTO: 10 % (ref 4–12)
NEUTROPHILS # BLD AUTO: 2.11 THOUSANDS/ΜL (ref 1.85–7.62)
NEUTS SEG NFR BLD AUTO: 53 % (ref 43–75)
NRBC BLD AUTO-RTO: 0 /100 WBCS
PLATELET # BLD AUTO: 256 THOUSANDS/UL (ref 149–390)
PMV BLD AUTO: 10.3 FL (ref 8.9–12.7)
POTASSIUM SERPL-SCNC: 3.7 MMOL/L (ref 3.5–5.3)
PROT SERPL-MCNC: 7.6 G/DL (ref 6.4–8.2)
RBC # BLD AUTO: 3.64 MILLION/UL (ref 3.81–5.12)
SODIUM SERPL-SCNC: 139 MMOL/L (ref 136–145)
WBC # BLD AUTO: 3.98 THOUSAND/UL (ref 4.31–10.16)

## 2020-10-30 PROCEDURE — 85025 COMPLETE CBC W/AUTO DIFF WBC: CPT

## 2020-10-30 PROCEDURE — 82378 CARCINOEMBRYONIC ANTIGEN: CPT

## 2020-10-30 PROCEDURE — 80053 COMPREHEN METABOLIC PANEL: CPT

## 2020-10-30 PROCEDURE — 36415 COLL VENOUS BLD VENIPUNCTURE: CPT

## 2020-11-02 ENCOUNTER — TELEPHONE (OUTPATIENT)
Dept: HEMATOLOGY ONCOLOGY | Facility: CLINIC | Age: 50
End: 2020-11-02

## 2020-11-03 ENCOUNTER — OFFICE VISIT (OUTPATIENT)
Dept: HEMATOLOGY ONCOLOGY | Facility: CLINIC | Age: 50
End: 2020-11-03
Payer: COMMERCIAL

## 2020-11-03 VITALS
OXYGEN SATURATION: 98 % | TEMPERATURE: 97 F | DIASTOLIC BLOOD PRESSURE: 80 MMHG | RESPIRATION RATE: 18 BRPM | HEIGHT: 60 IN | HEART RATE: 73 BPM | BODY MASS INDEX: 26.11 KG/M2 | WEIGHT: 133 LBS | SYSTOLIC BLOOD PRESSURE: 136 MMHG

## 2020-11-03 DIAGNOSIS — C20 RECTAL CANCER (HCC): Primary | ICD-10-CM

## 2020-11-03 PROCEDURE — 99214 OFFICE O/P EST MOD 30 MIN: CPT | Performed by: INTERNAL MEDICINE

## 2020-12-22 ENCOUNTER — TELEPHONE (OUTPATIENT)
Dept: SURGERY | Facility: HOSPITAL | Age: 50
End: 2020-12-22

## 2020-12-22 ENCOUNTER — TELEPHONE (OUTPATIENT)
Dept: RADIOLOGY | Facility: HOSPITAL | Age: 50
End: 2020-12-22

## 2020-12-22 RX ORDER — SODIUM CHLORIDE 9 MG/ML
75 INJECTION, SOLUTION INTRAVENOUS CONTINUOUS
Status: CANCELLED | OUTPATIENT
Start: 2020-12-22

## 2020-12-23 ENCOUNTER — HOSPITAL ENCOUNTER (OUTPATIENT)
Dept: RADIOLOGY | Facility: HOSPITAL | Age: 50
Discharge: HOME | End: 2020-12-23
Attending: INTERNAL MEDICINE | Admitting: RADIOLOGY
Payer: COMMERCIAL

## 2020-12-23 VITALS
BODY MASS INDEX: 25.13 KG/M2 | HEART RATE: 64 BPM | DIASTOLIC BLOOD PRESSURE: 59 MMHG | HEIGHT: 60 IN | RESPIRATION RATE: 18 BRPM | OXYGEN SATURATION: 100 % | WEIGHT: 128 LBS | SYSTOLIC BLOOD PRESSURE: 107 MMHG | TEMPERATURE: 97.4 F

## 2020-12-23 DIAGNOSIS — C20 RECTAL CANCER (HCC): ICD-10-CM

## 2020-12-23 PROCEDURE — 36590 REMOVAL TUNNELED CV CATH: CPT

## 2020-12-23 PROCEDURE — 36590 REMOVAL TUNNELED CV CATH: CPT | Performed by: RADIOLOGY

## 2020-12-23 PROCEDURE — 99152 MOD SED SAME PHYS/QHP 5/>YRS: CPT

## 2020-12-23 PROCEDURE — 99153 MOD SED SAME PHYS/QHP EA: CPT

## 2020-12-23 PROCEDURE — 99152 MOD SED SAME PHYS/QHP 5/>YRS: CPT | Performed by: RADIOLOGY

## 2020-12-23 RX ORDER — SODIUM CHLORIDE 9 MG/ML
75 INJECTION, SOLUTION INTRAVENOUS CONTINUOUS
Status: DISCONTINUED | OUTPATIENT
Start: 2020-12-23 | End: 2020-12-24 | Stop reason: HOSPADM

## 2020-12-23 RX ORDER — ACETAMINOPHEN 160 MG/5ML
650 SUSPENSION, ORAL (FINAL DOSE FORM) ORAL EVERY 4 HOURS PRN
Status: DISCONTINUED | OUTPATIENT
Start: 2020-12-23 | End: 2020-12-24 | Stop reason: HOSPADM

## 2020-12-23 RX ORDER — FENTANYL CITRATE 50 UG/ML
INJECTION, SOLUTION INTRAMUSCULAR; INTRAVENOUS CODE/TRAUMA/SEDATION MEDICATION
Status: COMPLETED | OUTPATIENT
Start: 2020-12-23 | End: 2020-12-23

## 2020-12-23 RX ORDER — MIDAZOLAM HYDROCHLORIDE 2 MG/2ML
INJECTION, SOLUTION INTRAMUSCULAR; INTRAVENOUS CODE/TRAUMA/SEDATION MEDICATION
Status: COMPLETED | OUTPATIENT
Start: 2020-12-23 | End: 2020-12-23

## 2020-12-23 RX ORDER — MORPHINE SULFATE 15 MG/1
15 TABLET ORAL EVERY 4 HOURS PRN
Status: DISCONTINUED | OUTPATIENT
Start: 2020-12-23 | End: 2020-12-24 | Stop reason: HOSPADM

## 2020-12-23 RX ADMIN — FENTANYL CITRATE 50 MCG: 50 INJECTION, SOLUTION INTRAMUSCULAR; INTRAVENOUS at 11:37

## 2020-12-23 RX ADMIN — FENTANYL CITRATE 50 MCG: 50 INJECTION, SOLUTION INTRAMUSCULAR; INTRAVENOUS at 11:33

## 2020-12-23 RX ADMIN — MIDAZOLAM HYDROCHLORIDE 2 MG: 1 INJECTION, SOLUTION INTRAMUSCULAR; INTRAVENOUS at 11:33

## 2021-02-20 ENCOUNTER — DOCUMENTATION (OUTPATIENT)
Dept: GASTROENTEROLOGY | Facility: CLINIC | Age: 51
End: 2021-02-20

## 2021-02-20 NOTE — LETTER
2/20/2021    Dear Angel Redman,    Review of our records shows you are due for the following:    Colonoscopy    Please call the following office to schedule your appointment:    [facility]    We look forward to hearing from you      Sincerely,    Dr Thapa Ac

## 2021-03-04 ENCOUNTER — OFFICE VISIT (OUTPATIENT)
Dept: OTOLARYNGOLOGY | Facility: CLINIC | Age: 51
End: 2021-03-04
Payer: COMMERCIAL

## 2021-03-04 VITALS — HEIGHT: 60 IN | TEMPERATURE: 98.3 F | WEIGHT: 128 LBS | BODY MASS INDEX: 25.13 KG/M2

## 2021-03-04 DIAGNOSIS — J31.0 NONALLERGIC RHINITIS: Primary | ICD-10-CM

## 2021-03-04 PROCEDURE — 99204 OFFICE O/P NEW MOD 45 MIN: CPT | Performed by: OTOLARYNGOLOGY

## 2021-03-04 RX ORDER — IPRATROPIUM BROMIDE 21 UG/1
2 SPRAY, METERED NASAL 3 TIMES DAILY PRN
Qty: 30 ML | Refills: 3 | Status: SHIPPED | OUTPATIENT
Start: 2021-03-04

## 2021-03-04 NOTE — PROGRESS NOTES
[facility]  [facility]  [facility]      Otolaryngology -- Head and Neck Surgery New Patient Visit    Joss Armstrong is a 48 y o  who presents with a chief complaint of rhinitis    Pertinent elements of the history include:  She began experiencing chronic rhinitis and excess tearing after taking chemotherapy for rectal cancer  Chemo completed in November  Symptoms improved since that point but continues to be persistent-- frequent congestion, clear to white rhinorrhea, persistent post nasal drip  Associated throat clearing  She also notes "a little bump" inside her nose  For her symptoms she tried Zyrtec and Flonase  This helped a little with the rhinorrhea but her throat clearing and PND have been persistent  She feels things have "tapered off" in the past 3 weeks  She denies globus  Does not increased pharyngeal mucus and throat clearing  No hoarseness  Had heartburn with chemo, but now rare since stopping therapy  Spicy foods trigger it  Review of systems: Pertinent review of systems documented in the HPI  10 point ROS documented in a separate note, as necessary  Results reviewed; images from any scan have been personally reviewed: The past medical, surgical, social and family history have been reviewed as documented in today's record  Past Medical History:   Diagnosis Date    Atrophic vaginitis     Bacterial vaginitis     BRCA1 negative     BRCA2 negative     Carcinoma of right breast in female, estrogen receptor negative (HCC)     Upper outer quadrant   Resolved 2017     Colon polyp     History of radiation therapy     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)     Malignant neoplasm metastasis to lymph nodes     Missed      Resolved 2015     PONV (postoperative nausea and vomiting)     Rectal cancer [facility])        Past Surgical History:   Procedure Laterality Date    AXILLARY NODE DISSECTION      BREAST BIOPSY Right     BREAST LUMPECTOMY Right     BREAST LUMPECTOMY Right 02/07/2012    COLONOSCOPY      HYSTERECTOMY  2012    IR PORT PLACEMENT  5/29/2020    IR PORT REMOVAL  12/23/2020    LAPAROSCOPIC TOTAL HYSTERECTOMY      OTHER SURGICAL HISTORY      Chemotherapeutics     [place] LAP,SURG,COLECTOMY, PARTIAL, W/ANAST N/A 4/30/2020    Procedure: DIAGNOSTIC LAPAROSCOPY, RESECTION LOW ANTERIOR LAPAROSCOPIC, FLEXIBLE SIGMOIDOSCOPY, LASER FLUORESENCE ANGIOGRAPHY (SPY);  Surgeon: Zander Marmolejo MD;  [facility];  Service: Colorectal       Family History   Problem Relation Age of Onset    Coronary artery disease Mother     Colon polyps Mother         Sigmoid colon    Skin cancer Maternal Grandmother     Colon cancer Maternal Grandfather 67    Colon polyps Maternal Grandfather         Sigmoid colon     Coronary artery disease Paternal Grandfather     Diabetes Paternal Grandfather     Colon polyps Paternal Grandfather         Sigmoid colon    Colon polyps Father         Sigmoid colon    No Known Problems Sister        Social History     Socioeconomic History    Marital status: Single     Spouse name: Not on file    Number of children: Not on file    Years of education: Not on file    Highest education level: Not on file   Occupational History    Not on file   Social Needs    Financial resource strain: Not on file    Food insecurity     Worry: Not on file     Inability: Not on file    Transportation needs     Medical: Not on file     Non-medical: Not on file   Tobacco Use    Smoking status: Never Smoker    Smokeless tobacco: Never Used   Substance and Sexual Activity    Alcohol use: Yes     Frequency: Monthly or less    Drug use: No    Sexual activity: Yes     Comment: hysterectomy   Lifestyle    Physical activity     Days per week: Not on file     Minutes per session: Not on file    Stress: Not on file   Relationships    Social connections     Talks on phone: Not on file     Gets together: Not on file Attends Mu-ism service: Not on file     Active member of club or organization: Not on file     Attends meetings of clubs or organizations: Not on file     Relationship status: Not on file    Intimate partner violence     Fear of current or ex partner: Not on file     Emotionally abused: Not on file     Physically abused: Not on file     Forced sexual activity: Not on file   Other Topics Concern    Not on file   Social History Narrative    Not on file       Current Outpatient Medications on File Prior to Visit   Medication Sig Dispense Refill    Calcium Carbonate-Vit D-Min (CALCIUM 1200 PO) Take by mouth      cholecalciferol (VITAMIN D3) 1,000 units tablet Take 1,000 Units by mouth daily      Multiple Vitamin (MULTIVITAMIN) tablet Take 1 tablet by mouth daily      [DISCONTINUED] al mag oxide-diphenhydramine-lidocaine viscous (MAGIC MOUTHWASH) 1:1:1 suspension Swish and spit 10 mL every 4 (four) hours as needed for mouth pain or discomfort 180 mL 0    [DISCONTINUED] calcium polycarbophil (FIBERCON) 625 mg tablet Take 625 mg by mouth daily      [DISCONTINUED] capecitabine (XELODA) 500 MG tablet TAKE 3 TABLETS (1,500 MG TOTAL) BY MOUTH 2 TIMES A DAY FOR 2 WEEKS ON AND ONE WEEK OFF 84 tablet 2    [DISCONTINUED] CRANIAL PROSTHESIS, RX, One wig as needed 1 Piece 0    [DISCONTINUED] lidocaine-prilocaine (EMLA) cream Apply topically as needed for mild pain 30 g 0    [DISCONTINUED] LORazepam (ATIVAN) 0 5 mg tablet Take 1 tablet (0 5 mg total) by mouth every 8 (eight) hours as needed (nausea) 30 tablet 0    [DISCONTINUED] metoclopramide (REGLAN) 10 mg tablet Take 1 tablet (10 mg total) by mouth 4 (four) times a day 30 tablet 1    [DISCONTINUED] ondansetron (ZOFRAN) 4 mg tablet Take 1 tablet (4 mg total) by mouth every 8 (eight) hours as needed for nausea or vomiting 60 tablet 1    [DISCONTINUED] prochlorperazine (COMPAZINE) 10 mg tablet Take 1 tablet (10 mg total) by mouth every 6 (six) hours as needed for nausea or vomiting 30 tablet 1     No current facility-administered medications on file prior to visit  Physical exam:   Temp 98 3 °F (36 8 °C) (Temporal)   Ht 5' (1 524 m)   Wt 58 1 kg (128 lb)   BMI 25 00 kg/m²     Constitutional:  Well developed, well nourished and groomed, in no acute distress  Eyes:  Extra-ocular movements intact, pupils equally round and reactive to light and accommodation, the lids and conjunctivae are normal in appearance  Head: Atraumatic, normocephalic, no visible scalp lesions, bony palpation unremarkable without stepoffs, parotid and submandibular salivary glands non-tender to palpation and without masses bilaterally  Ears:  Auricles normal in appearance bilaterally, mastoid prominence non-tender, external auditory canals clear bilaterally  Tympanic membranes intact  Normal appearing ossicles  Nose/Sinuses:  External appearance unremarkable, no maxillary or frontal sinus tenderness to palpation bilaterally  Anterior rhinoscopy reveals: mucosal edema, white exudate     Oral Cavity:  Moist mucus membranes, gums and dentition unremarkable, no oral mucosal masses or lesions, floor of mouth soft, tongue mobile without masses or lesions  Oropharynx:  Base of tongue soft and without masses, tonsils bilaterally unremarkable, soft palate mucosa unremarkable  Neck:  No visible or palpable cervical lesions or lymphadenopathy, thyroid gland is normal in size and symmetry and without masses, normal laryngeal elevation with swallowing  Cardiovascular:  Normal rate and rhythm, no palpable thrills, no jugulovenous distension observed  Respiratory:  Normal respiratory effort without evidence of retractions or use of accessory muscles  Integument:  Normal appearing without observed masses or lesions  Neurologic:  Cranial nerves II-XII intact bilaterally  Psychiatric:  Alert and oriented to time, place and person, normal affect  Procedures        Assessment:   1  Nonallergic rhinitis  ipratropium (ATROVENT) 0 03 % nasal spray       Orders  No orders of the defined types were placed in this encounter  Discussion/Plan:    1  Based on her symptoms and exam findings I believe she is most likely suffering from nonallergic rhinitis  I placed her on Atrovent for this  Follow-up in one month and if improved she would benefit from the Clarifix procedure  2   Symptoms may also be reflux related  However I suspect this is less likely the cause  If she is unimproved at her next visit we will perform a flexible laryngoscopy to assess for reflux changes

## 2021-03-10 DIAGNOSIS — Z23 ENCOUNTER FOR IMMUNIZATION: ICD-10-CM

## 2021-04-08 ENCOUNTER — OFFICE VISIT (OUTPATIENT)
Dept: OTOLARYNGOLOGY | Facility: CLINIC | Age: 51
End: 2021-04-08
Payer: COMMERCIAL

## 2021-04-08 VITALS — WEIGHT: 128 LBS | BODY MASS INDEX: 25.13 KG/M2 | HEIGHT: 60 IN | TEMPERATURE: 98.3 F

## 2021-04-08 DIAGNOSIS — J31.0 NONALLERGIC RHINITIS: Primary | ICD-10-CM

## 2021-04-08 PROCEDURE — 99213 OFFICE O/P EST LOW 20 MIN: CPT | Performed by: OTOLARYNGOLOGY

## 2021-04-08 NOTE — PROGRESS NOTES
Otolaryngology-- Head and Neck Surgery Follow up visit      CC: nonallergic rhinitis      Time interval of problem since last visit:  1 month    Pertinent interval elements of the history:  She returns after a month  She reports improvement in rhinorrhea congestion and postnasal drip on Atrovent  However when the spray wears off symptoms return perhaps even more forcefully than before using the spray  She also feels that dietary changes and restrictions have made a difference and after a period of detox she reports resolution of her rhinitis  Review of any relevant imaging:      Interval Review of systems: Pertinent review of systems documented in the HPI      Interval Social History:  Social History     Socioeconomic History    Marital status: Single     Spouse name: Not on file    Number of children: Not on file    Years of education: Not on file    Highest education level: Not on file   Occupational History    Not on file   Social Needs    Financial resource strain: Not on file    Food insecurity     Worry: Not on file     Inability: Not on file    Transportation needs     Medical: Not on file     Non-medical: Not on file   Tobacco Use    Smoking status: Never Smoker    Smokeless tobacco: Never Used   Substance and Sexual Activity    Alcohol use: Yes     Frequency: Monthly or less    Drug use: No    Sexual activity: Yes     Comment: hysterectomy   Lifestyle    Physical activity     Days per week: Not on file     Minutes per session: Not on file    Stress: Not on file   Relationships    Social connections     Talks on phone: Not on file     Gets together: Not on file     Attends Orthodox service: Not on file     Active member of club or organization: Not on file     Attends meetings of clubs or organizations: Not on file     Relationship status: Not on file    Intimate partner violence     Fear of current or ex partner: Not on file     Emotionally abused: Not on file     Physically abused: Not on file     Forced sexual activity: Not on file   Other Topics Concern    Not on file   Social History Narrative    Not on file       Interval Physical Examination:  Temp 98 3 °F (36 8 °C) (Temporal)   Ht 5' (1 524 m)   Wt 58 1 kg (128 lb)   BMI 25 00 kg/m²   NAD      Procedure:      Assessment:  1  Nonallergic rhinitis         Plan:  1  Ultimately some of her symptoms may be affected by 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

## 2021-04-28 ENCOUNTER — HOSPITAL ENCOUNTER (OUTPATIENT)
Dept: RADIOLOGY | Facility: HOSPITAL | Age: 51
Discharge: HOME | End: 2021-04-28
Attending: INTERNAL MEDICINE
Payer: COMMERCIAL

## 2021-04-28 DIAGNOSIS — C20 RECTAL CANCER (HCC): ICD-10-CM

## 2021-04-28 PROCEDURE — G1004 CDSM NDSC: HCPCS

## 2021-04-28 PROCEDURE — 74177 CT ABD & PELVIS W/CONTRAST: CPT

## 2021-04-28 PROCEDURE — 71260 CT THORAX DX C+: CPT

## 2021-04-28 RX ADMIN — IOHEXOL 100 ML: 350 INJECTION, SOLUTION INTRAVENOUS at 07:50

## 2021-04-30 ENCOUNTER — TELEPHONE (OUTPATIENT)
Dept: HEMATOLOGY ONCOLOGY | Facility: CLINIC | Age: 51
End: 2021-04-30

## 2021-04-30 ENCOUNTER — APPOINTMENT (OUTPATIENT)
Dept: LAB | Facility: CLINIC | Age: 51
End: 2021-04-30
Payer: COMMERCIAL

## 2021-04-30 DIAGNOSIS — C20 RECTAL CANCER (HCC): ICD-10-CM

## 2021-04-30 LAB
ALBUMIN SERPL BCP-MCNC: 3.6 G/DL (ref 3.5–5)
ALP SERPL-CCNC: 71 U/L (ref 46–116)
ALT SERPL W P-5'-P-CCNC: 29 U/L (ref 12–78)
ANION GAP SERPL CALCULATED.3IONS-SCNC: 5 MMOL/L (ref 4–13)
AST SERPL W P-5'-P-CCNC: 17 U/L (ref 5–45)
BASOPHILS # BLD AUTO: 0.05 THOUSANDS/ΜL (ref 0–0.1)
BASOPHILS NFR BLD AUTO: 1 % (ref 0–1)
BILIRUB SERPL-MCNC: 0.57 MG/DL (ref 0.2–1)
BUN SERPL-MCNC: 7 MG/DL (ref 5–25)
CALCIUM SERPL-MCNC: 9.4 MG/DL (ref 8.3–10.1)
CEA SERPL-MCNC: <0.5 NG/ML (ref 0–3)
CHLORIDE SERPL-SCNC: 108 MMOL/L (ref 100–108)
CO2 SERPL-SCNC: 29 MMOL/L (ref 21–32)
CREAT SERPL-MCNC: 0.69 MG/DL (ref 0.6–1.3)
EOSINOPHIL # BLD AUTO: 0.06 THOUSAND/ΜL (ref 0–0.61)
EOSINOPHIL NFR BLD AUTO: 1 % (ref 0–6)
ERYTHROCYTE [DISTWIDTH] IN BLOOD BY AUTOMATED COUNT: 12.7 % (ref 11.6–15.1)
GFR SERPL CREATININE-BSD FRML MDRD: 102 ML/MIN/1.73SQ M
GLUCOSE P FAST SERPL-MCNC: 81 MG/DL (ref 65–99)
HCT VFR BLD AUTO: 42.3 % (ref 34.8–46.1)
HGB BLD-MCNC: 13.3 G/DL (ref 11.5–15.4)
IMM GRANULOCYTES # BLD AUTO: 0.01 THOUSAND/UL (ref 0–0.2)
IMM GRANULOCYTES NFR BLD AUTO: 0 % (ref 0–2)
LYMPHOCYTES # BLD AUTO: 1.51 THOUSANDS/ΜL (ref 0.6–4.47)
LYMPHOCYTES NFR BLD AUTO: 30 % (ref 14–44)
MCH RBC QN AUTO: 30.3 PG (ref 26.8–34.3)
MCHC RBC AUTO-ENTMCNC: 31.4 G/DL (ref 31.4–37.4)
MCV RBC AUTO: 96 FL (ref 82–98)
MONOCYTES # BLD AUTO: 0.44 THOUSAND/ΜL (ref 0.17–1.22)
MONOCYTES NFR BLD AUTO: 9 % (ref 4–12)
NEUTROPHILS # BLD AUTO: 3.05 THOUSANDS/ΜL (ref 1.85–7.62)
NEUTS SEG NFR BLD AUTO: 59 % (ref 43–75)
NRBC BLD AUTO-RTO: 0 /100 WBCS
PLATELET # BLD AUTO: 298 THOUSANDS/UL (ref 149–390)
PMV BLD AUTO: 10.6 FL (ref 8.9–12.7)
POTASSIUM SERPL-SCNC: 3.9 MMOL/L (ref 3.5–5.3)
PROT SERPL-MCNC: 7.2 G/DL (ref 6.4–8.2)
RBC # BLD AUTO: 4.39 MILLION/UL (ref 3.81–5.12)
SODIUM SERPL-SCNC: 142 MMOL/L (ref 136–145)
WBC # BLD AUTO: 5.12 THOUSAND/UL (ref 4.31–10.16)

## 2021-04-30 PROCEDURE — 82378 CARCINOEMBRYONIC ANTIGEN: CPT

## 2021-04-30 PROCEDURE — 36415 COLL VENOUS BLD VENIPUNCTURE: CPT

## 2021-04-30 PROCEDURE — 80053 COMPREHEN METABOLIC PANEL: CPT

## 2021-04-30 PROCEDURE — 85025 COMPLETE CBC W/AUTO DIFF WBC: CPT

## 2021-04-30 NOTE — TELEPHONE ENCOUNTER
Left voice message for [facility] staff in regards to this patient she has an [facility] appointment and we need her CT to be read before this appointment

## 2021-05-04 ENCOUNTER — OFFICE VISIT (OUTPATIENT)
Dept: HEMATOLOGY ONCOLOGY | Facility: CLINIC | Age: 51
End: 2021-05-04
Payer: COMMERCIAL

## 2021-05-04 VITALS
DIASTOLIC BLOOD PRESSURE: 82 MMHG | SYSTOLIC BLOOD PRESSURE: 138 MMHG | WEIGHT: 134 LBS | HEIGHT: 60 IN | RESPIRATION RATE: 18 BRPM | TEMPERATURE: 96.1 F | HEART RATE: 94 BPM | BODY MASS INDEX: 26.31 KG/M2 | OXYGEN SATURATION: 96 %

## 2021-05-04 DIAGNOSIS — C20 RECTAL CANCER (HCC): Primary | ICD-10-CM

## 2021-05-04 DIAGNOSIS — Z85.3 HISTORY OF RIGHT BREAST CANCER: ICD-10-CM

## 2021-05-04 PROCEDURE — 99214 OFFICE O/P EST MOD 30 MIN: CPT | Performed by: INTERNAL MEDICINE

## 2021-05-04 NOTE — PROGRESS NOTES
Hematology / Oncology Outpatient Follow Up Note    Tonia Mccoy 48 y o  female LZE:[birth year] AMK:[id number]         Date:  5/4/2021    Assessment / Plan:  A 53 year old premenopausal woman with locally advanced right breast cancer, ER/RI negative HER-2 3+ disease, diagnosed in July 2011  She underwent neoadjuvant chemotherapy with AC followed by paclitaxel and Herceptin resulting in complete pathological response followed by lumpectomy with axillary lymph node dissection, resulting in the CLEMENTINE    She has no evidence of recurrence of breast cancer   In April 2020, she was diagnosed with stage IIIB rectal cancer, status post AP resection resulting in CLEMENTINE   She has 6 positive lymph nodes   She received 2 doses of adjuvant chemotherapy with FOLFOX-6 , followed by 3 doses of XEROX with poor tolerance  Therefore, she had the rest of adjuvant chemotherapy with capecitabine monotherapy until November 2020  She is currently on observation  Clinically as well as radiographically, she has no evidence recurrent disease  I recommended her to continue with surveillance  She is moving down to [place] next week  I recommended her to find new medical oncologist in [place], so that she can transfer her care to new oncologist   She is in agreement with my recommendations        Subjective:      HPI:             Interval History:  A 48year-old premenopausal woman with locally advanced right breast cancer with ER/RI negative HER-2 3+ disease diagnosed in July 2011   She underwent neoadjuvant chemotherapy with AC followed by paclitaxel and Herceptin resulting in complete pathological response   She has no evidence of recurrence of breast cancer, to date   She recently noticed some occasional rectal bleeding   She underwent 1st colonoscopy which showed a rectal mass, 11 cm from the anal verge   Biopsy showed adenocarcinoma   Based on the MRI of the pelvis, this was thought to be stage II with no enlarged adjacent lymphadenopathy  Orvel Poncho underwent surgical resection in April 30, 2020  Pathology report showed 3 6 x 2 7 x 1 3 cm adenocarcinoma, grade 2  6/19 regional lymph nodes were positive for metastatic disease   Her preoperative CEA was less than 0 5   CT scan of chest abdomen pelvis showed no evidence of distant metastasis   Her tumor showed no loss of MMR protein   She underwent 2 cycle of FOLFOX as adjuvant chemotherapy with very poor tolerance   Therefore, she switched her adjuvant chemotherapy to [facility] had 3 cycle of XELOX with very poor tolerance mainly due to the oxaliplatin infusion  Subsequently, she discontinued oxaliplatin and stayed on capecitabine 1500 mg b i d  2 weeks on followed by 1 week off, until November 2020  Currently, she is on observation  She presents today for routine follow-up  She feels very well  She has no complaint of pain  Her bowel movement is normal   She denied any nausea vomiting  Her weight is stable  Her performance status is normal        Objective:      Primary Diagnosis:      1  Locally advanced right breast cancer, ER/OH negative, HER-2 (3+) disease diagnosed in July 2011       2  BRCA mutation negative       3  Rectal cancer, stage IIIB (pT3, pN2, M0) grade 2 with no loss of MMR protein   Diagnosed in April 2020       Cancer Staging:  Cancer Staging  No matching staging information was found for the patient         Previous Hematologic/ Oncologic Treatment:      1  Neoadjuvant chemotherapy with dose-dense AC x4 followed by weekly paclitaxel and Herceptin x12    2  Adjuvant Herceptin 6 mg/kg x13 cycles, completed in December 2012    3  Adjuvant chemotherapy with FOLFOX-6 x 2 doses, completed in June 2020    4  Adjuvant chemotherapy with XELOX x3 cycles, completed in August 2020    5   Adjuvant chemotherapy with capecitabine from August 2020 through mid November 2020       Current Hematologic/ Oncologic Treatment:       Observation         Disease Status:      CLEMENTINE status post AP resection      Test Results:     Pathology:     Rectum shows 3 6 x 2 7 x 1 3 cm of adenocarcinoma, grade 2 with depth of invasion T3   6/19 regional lymph node were positive for metastatic disease   Stage IIIB (pT3, pN2, M0) no loss of MMR protein      Radiology:     CT scan of chest abdomen pelvis in April 2021 showed no evidence of metastatic disease      Laboratory:     See below      Physical Exam:        General Appearance:    Alert, oriented          Eyes:    PERRL   Ears:    Normal external ear canals, both ears   Nose:   Nares normal, septum midline   Throat:   Mucosa moist  Pharynx without injection  Neck:   Supple         Lungs:     Clear to auscultation bilaterally   Chest Wall:    No tenderness or deformity    Heart:    Regular rate and rhythm         Abdomen:     Soft, non-tender, bowel sounds +, no organomegaly               Extremities:   Extremities no cyanosis or edema         Skin:   no rash or icterus  Lymph nodes:   Cervical, supraclavicular, and axillary nodes normal   Neurologic:   CNII-XII intact, normal strength, sensation and reflexes     Throughout             Breast exam:   NA           ROS: Review of Systems   All other systems reviewed and are negative  Imaging: Ct Chest Abdomen Pelvis W Contrast    Result Date: 4/30/2021  Narrative: CT CHEST, ABDOMEN AND PELVIS WITH IV CONTRAST INDICATION:   C20: Malignant neoplasm of rectum  COMPARISON:  CT abdomen pelvis 3/20/2020; MRI pelvis 3/31/2020 TECHNIQUE: CT examination of the chest, abdomen and pelvis was performed  Axial, sagittal, and coronal 2D reformatted images were created from the source data and submitted for interpretation  Radiation dose length product (DLP) for this visit:  358 31 mGy-cm     This examination, like all CT scans performed in the [facility], was performed utilizing techniques to minimize radiation dose exposure, including the use of iterative  reconstruction and automated exposure control  IV Contrast:  100 mL of iohexol (OMNIPAQUE) Enteric Contrast: Enteric contrast was administered  FINDINGS: CHEST LUNGS:  Right apical lateral right fibrosis and subpleural radiation changes in the anterior segment right upper lobe stable  No pulmonary nodules or tracheal and/or endobronchial lesions  PLEURA:  Unremarkable  HEART/GREAT VESSELS:  Unremarkable for patient's age  MEDIASTINUM AND JEFFREY:  Unremarkable  CHEST WALL AND LOWER NECK: Incidental discovery of one or more thyroid nodule(s) measuring less than 1 5 cm and without suspicious features is noted in this patient who is above 28years old; according to guidelines published in the February 2015 white paper on incidental thyroid nodules in the Journal of the [employer] of Radiology [facility]), no further evaluation is recommended  Right axillary clips with therapeutic changes of the right breast again noted  ABDOMEN LIVER/BILIARY TREE:  Unremarkable  GALLBLADDER:  No calcified gallstones  No pericholecystic inflammatory change  SPLEEN:  Unremarkable  PANCREAS:  Unremarkable  ADRENAL GLANDS:  Unremarkable  KIDNEYS/URETERS:  Tiny angiomyolipoma left lower pole stable  No hydronephrosis  STOMACH AND BOWEL:  Post operative changes status post low anterior resection for rectal carcinoma  No bowel obstruction or evidence for tumor recurrence  APPENDIX:  No findings to suggest appendicitis  ABDOMINOPELVIC CAVITY:  Trace presacral ascites likely therapeutic  VESSELS:  Unremarkable for patient's age  PELVIS REPRODUCTIVE ORGANS:  Surgical changes of prior hysterectomy  URINARY BLADDER:  Trace air in the bladder dome with no wall thickening or perivesical inflammation  ABDOMINAL WALL/INGUINAL REGIONS:  Unremarkable  OSSEOUS STRUCTURES:  No acute fracture or destructive osseous lesion  Impression: 1  Postoperative changes status post low anterior resection for rectal carcinoma    Trace ascites in the presacral space with no tumor recurrence and/or pelvic lymphadenopathy  2   No metastatic disease in the chest, abdomen, or pelvis  3   Stable radiation changes right upper lobe  Workstation performed: [id number]         Labs:   Lab Results   Component Value Date    WBC 5 12 04/30/2021    HGB 13 3 04/30/2021    HCT 42 3 04/30/2021    MCV 96 04/30/2021     04/30/2021     Lab Results   Component Value Date    K 3 9 04/30/2021     04/30/2021    CO2 29 04/30/2021    BUN 7 04/30/2021    CREATININE 0 69 04/30/2021    GLUF 81 04/30/2021    CALCIUM 9 4 04/30/2021    AST 17 04/30/2021    ALT 29 04/30/2021    ALKPHOS 71 04/30/2021    EGFR 102 04/30/2021         Lab Results   Component Value Date    CEA <0 5 04/30/2021         Current Medications: Reviewed  Allergies: Reviewed  PMH/FH/SH:  Reviewed      Vital Sign:    Body surface area is 1 57 meters squared      Wt Readings from Last 3 Encounters:   05/04/21 60 8 kg (134 lb)   04/08/21 58 1 kg (128 lb)   03/04/21 58 1 kg (128 lb)        Temp Readings from Last 3 Encounters:   05/04/21 (!) 96 1 °F (35 6 °C) (Tympanic Core)   04/08/21 98 3 °F (36 8 °C) (Temporal)   03/04/21 98 3 °F (36 8 °C) (Temporal)        BP Readings from Last 3 Encounters:   05/04/21 138/82   12/23/20 107/59   11/03/20 136/80         Pulse Readings from Last 3 Encounters:   05/04/21 94   12/23/20 64   11/03/20 73     @LASTSAO2(3)@

## 2021-05-07 DIAGNOSIS — N76.0 BV (BACTERIAL VAGINOSIS): Primary | ICD-10-CM

## 2021-05-07 DIAGNOSIS — B96.89 BV (BACTERIAL VAGINOSIS): Primary | ICD-10-CM

## 2021-05-07 RX ORDER — METRONIDAZOLE 500 MG/1
500 TABLET ORAL EVERY 12 HOURS SCHEDULED
Qty: 14 TABLET | Refills: 0 | Status: SHIPPED | OUTPATIENT
Start: 2021-05-07 | End: 2021-05-14

## 2021-05-07 NOTE — TELEPHONE ENCOUNTER
Patient called requesting Nell ESCALONA to call in [address] as she has done in the past  Pt has not been since 2019  She is unable to come in for a visit, but can do virtual or phone call  Please advise

## 2021-06-07 ENCOUNTER — TELEPHONE (OUTPATIENT)
Dept: HEMATOLOGY ONCOLOGY | Facility: CLINIC | Age: 51
End: 2021-06-07

## 2021-06-07 NOTE — TELEPHONE ENCOUNTER
Patient calling in to request a letter from Dr Penny Rivera stating that she needs to be under and oncologists care as she has relocated to [place] and is attempting to establish care  She will also need demographics sheet and records sent to her  I advised the patient this may have to come from the medical records dept directly   She is looking at establishing care with Dr Juli Winters located in [place], [address] [place] [address] can be reached at [phone]

## 2021-07-29 ENCOUNTER — APPOINTMENT (RX ONLY)
Dept: URBAN - METROPOLITAN AREA CLINIC 54 | Facility: CLINIC | Age: 51
Setting detail: DERMATOLOGY
End: 2021-07-29

## 2021-07-29 DIAGNOSIS — D22 MELANOCYTIC NEVI: ICD-10-CM | Status: STABLE

## 2021-07-29 DIAGNOSIS — L81.4 OTHER MELANIN HYPERPIGMENTATION: ICD-10-CM | Status: STABLE

## 2021-07-29 DIAGNOSIS — L85.3 XEROSIS CUTIS: ICD-10-CM | Status: STABLE

## 2021-07-29 DIAGNOSIS — L82.1 OTHER SEBORRHEIC KERATOSIS: ICD-10-CM | Status: STABLE

## 2021-07-29 PROBLEM — D22.5 MELANOCYTIC NEVI OF TRUNK: Status: ACTIVE | Noted: 2021-07-29

## 2021-07-29 PROCEDURE — ? SCREENING FOR COVID-19

## 2021-07-29 PROCEDURE — ? COUNSELING

## 2021-07-29 PROCEDURE — ? PRESCRIPTION

## 2021-07-29 PROCEDURE — ? SUNSCREEN RECOMMENDATIONS

## 2021-07-29 PROCEDURE — ? FULL BODY SKIN EXAM

## 2021-07-29 PROCEDURE — 99203 OFFICE O/P NEW LOW 30 MIN: CPT

## 2021-07-29 RX ORDER — HYDROQUINONE 4 %
4% CREAM (GRAM) TOPICAL BID
Qty: 1 | Refills: 3 | Status: ERX | COMMUNITY
Start: 2021-07-29

## 2021-07-29 RX ADMIN — Medication 4%: at 00:00

## 2021-07-29 ASSESSMENT — LOCATION DETAILED DESCRIPTION DERM
LOCATION DETAILED: LEFT MID-UPPER BACK
LOCATION DETAILED: RIGHT MID-UPPER BACK
LOCATION DETAILED: LEFT DISTAL CALF
LOCATION DETAILED: LEFT SUPERIOR UPPER BACK

## 2021-07-29 ASSESSMENT — LOCATION SIMPLE DESCRIPTION DERM
LOCATION SIMPLE: LEFT CALF
LOCATION SIMPLE: RIGHT UPPER BACK
LOCATION SIMPLE: LEFT UPPER BACK

## 2021-07-29 ASSESSMENT — LOCATION ZONE DERM
LOCATION ZONE: TRUNK
LOCATION ZONE: LEG

## 2021-08-10 ENCOUNTER — APPOINTMENT (RX ONLY)
Dept: URBAN - METROPOLITAN AREA CLINIC 53 | Facility: CLINIC | Age: 51
Setting detail: DERMATOLOGY
End: 2021-08-10

## 2021-08-10 DIAGNOSIS — Z41.9 ENCOUNTER FOR PROCEDURE FOR PURPOSES OTHER THAN REMEDYING HEALTH STATE, UNSPECIFIED: ICD-10-CM

## 2021-08-10 PROCEDURE — ? IN-HOUSE DISPENSING PHARMACY

## 2021-08-10 PROCEDURE — ? HYDRAFACIAL

## 2021-08-10 ASSESSMENT — LOCATION DETAILED DESCRIPTION DERM
LOCATION DETAILED: RIGHT SUPERIOR TEMPLE
LOCATION DETAILED: LEFT SUPERIOR CENTRAL BUCCAL CHEEK
LOCATION DETAILED: RIGHT CENTRAL MALAR CHEEK
LOCATION DETAILED: GLABELLA
LOCATION DETAILED: RIGHT LOWER CUTANEOUS LIP
LOCATION DETAILED: LEFT INFERIOR MEDIAL MALAR CHEEK
LOCATION DETAILED: RIGHT FOREHEAD
LOCATION DETAILED: LEFT MEDIAL MALAR CHEEK
LOCATION DETAILED: LEFT FOREHEAD
LOCATION DETAILED: LEFT LOWER CUTANEOUS LIP
LOCATION DETAILED: INFERIOR MID FOREHEAD
LOCATION DETAILED: NASAL DORSUM
LOCATION DETAILED: LEFT SUPERIOR TEMPLE
LOCATION DETAILED: RIGHT INFERIOR CENTRAL MALAR CHEEK
LOCATION DETAILED: RIGHT CENTRAL BUCCAL CHEEK

## 2021-08-10 ASSESSMENT — LOCATION SIMPLE DESCRIPTION DERM
LOCATION SIMPLE: LEFT FOREHEAD
LOCATION SIMPLE: RIGHT LIP
LOCATION SIMPLE: INFERIOR FOREHEAD
LOCATION SIMPLE: RIGHT TEMPLE
LOCATION SIMPLE: LEFT CHEEK
LOCATION SIMPLE: LEFT TEMPLE
LOCATION SIMPLE: RIGHT FOREHEAD
LOCATION SIMPLE: GLABELLA
LOCATION SIMPLE: LEFT LIP
LOCATION SIMPLE: NOSE
LOCATION SIMPLE: RIGHT CHEEK

## 2021-08-10 ASSESSMENT — LOCATION ZONE DERM
LOCATION ZONE: LIP
LOCATION ZONE: NOSE
LOCATION ZONE: FACE

## 2021-08-10 NOTE — PROCEDURE: HYDRAFACIAL
Vacuum Pressure: 22
Indication: prominent pores
Consent: Written consent obtained, risks reviewed including but not limited to crusting, scabbing, blistering, scarring, darker or lighter pigmentary change, bruising, and/or incomplete response.
Detail Level: Zone
Glycolic Acid %: 7.5%
Additional Vacuum Pressure (Won't Render If 0): 0
Post-Care Instructions: I reviewed with the patient in detail post-care instructions. Patient should stay away from the sun and wear sun protection until treated areas are fully healed.
Treatment Number: 1
Price (Use Numbers Only, No Special Characters Or $): 260.00

## 2021-08-10 NOTE — PROCEDURE: IN-HOUSE DISPENSING PHARMACY
Product 18 Refills: 0
Product 16 Unit Type: mg
Product 4 Amount/Unit (Numbers Only): 1
Product 3 Application Directions: Apply every day
Product 2 Price/Unit (In Dollars): 148.00
Render Product Pricing In Note: Yes
Product 2 Application Directions: Use 4x a week at night only
Product 4 Price/Unit (In Dollars): 121.00
Product 1 Price/Unit (In Dollars): 65.00
Name Of Product 3: RE super charged vit c
Product 1 Application Directions: Use daily
Detail Level: Zone
Name Of Product 2: Zo growth factor
Name Of Product 1: Obagi Tretinoin
Name Of Product 4: ZO eye brightening
Product 4 Application Directions: Apply to eyes at night

## 2021-09-13 ENCOUNTER — APPOINTMENT (RX ONLY)
Dept: URBAN - METROPOLITAN AREA CLINIC 53 | Facility: CLINIC | Age: 51
Setting detail: DERMATOLOGY
End: 2021-09-13

## 2021-09-13 DIAGNOSIS — Z41.9 ENCOUNTER FOR PROCEDURE FOR PURPOSES OTHER THAN REMEDYING HEALTH STATE, UNSPECIFIED: ICD-10-CM

## 2021-09-13 PROCEDURE — ? HYDRAFACIAL

## 2021-09-13 ASSESSMENT — LOCATION SIMPLE DESCRIPTION DERM
LOCATION SIMPLE: CHIN
LOCATION SIMPLE: RIGHT FOREHEAD
LOCATION SIMPLE: LEFT FOREHEAD
LOCATION SIMPLE: NOSE
LOCATION SIMPLE: RIGHT TEMPLE
LOCATION SIMPLE: RIGHT CHEEK
LOCATION SIMPLE: LEFT CHEEK

## 2021-09-13 ASSESSMENT — LOCATION ZONE DERM
LOCATION ZONE: NOSE
LOCATION ZONE: FACE

## 2021-09-13 ASSESSMENT — LOCATION DETAILED DESCRIPTION DERM
LOCATION DETAILED: LEFT SUPERIOR CENTRAL MALAR CHEEK
LOCATION DETAILED: LEFT CHIN
LOCATION DETAILED: LEFT CENTRAL BUCCAL CHEEK
LOCATION DETAILED: LEFT SUPERIOR MEDIAL BUCCAL CHEEK
LOCATION DETAILED: LEFT INFERIOR MEDIAL MALAR CHEEK
LOCATION DETAILED: LEFT INFERIOR LATERAL FOREHEAD
LOCATION DETAILED: RIGHT INFERIOR MEDIAL FOREHEAD
LOCATION DETAILED: RIGHT INFERIOR LATERAL MALAR CHEEK
LOCATION DETAILED: NASAL DORSUM
LOCATION DETAILED: LEFT SUPERIOR CENTRAL BUCCAL CHEEK
LOCATION DETAILED: RIGHT CENTRAL MALAR CHEEK
LOCATION DETAILED: LEFT FOREHEAD
LOCATION DETAILED: LEFT INFERIOR CENTRAL MALAR CHEEK
LOCATION DETAILED: RIGHT CENTRAL BUCCAL CHEEK
LOCATION DETAILED: RIGHT SUPERIOR TEMPLE
LOCATION DETAILED: RIGHT FOREHEAD
LOCATION DETAILED: LEFT CENTRAL MALAR CHEEK

## 2021-09-13 NOTE — PROCEDURE: HYDRAFACIAL
Consent: Written consent obtained, risks reviewed including but not limited to crusting, scabbing, blistering, scarring, darker or lighter pigmentary change, bruising, and/or incomplete response.
Detail Level: Zone
Treatment Number: 1
Glycolic Acid %: 7.5%
Price (Use Numbers Only, No Special Characters Or $): 260.00
Additional Vacuum Pressure (Won't Render If 0): 0
Indication: prominent pores
Post-Care Instructions: I reviewed with the patient in detail post-care instructions. Patient should stay away from the sun and wear sun protection until treated areas are fully healed.
Vacuum Pressure: 22

## 2021-09-16 ENCOUNTER — TELEPHONE (OUTPATIENT)
Dept: SURGICAL ONCOLOGY | Facility: CLINIC | Age: 51
End: 2021-09-16

## 2021-09-16 NOTE — TELEPHONE ENCOUNTER
Called pt to remind her of appt tomorrow with Wendi Reyez   She is cancelling it because she has relocated to [place] and has established care with Dr Femi Butts located in [facility]

## 2021-10-11 ENCOUNTER — APPOINTMENT (RX ONLY)
Dept: URBAN - METROPOLITAN AREA CLINIC 53 | Facility: CLINIC | Age: 51
Setting detail: DERMATOLOGY
End: 2021-10-11

## 2021-10-11 DIAGNOSIS — Z41.9 ENCOUNTER FOR PROCEDURE FOR PURPOSES OTHER THAN REMEDYING HEALTH STATE, UNSPECIFIED: ICD-10-CM

## 2021-10-11 PROCEDURE — ? HYDRAFACIAL

## 2021-10-11 ASSESSMENT — LOCATION DETAILED DESCRIPTION DERM
LOCATION DETAILED: RIGHT CENTRAL MALAR CHEEK
LOCATION DETAILED: RIGHT INFERIOR CENTRAL MALAR CHEEK
LOCATION DETAILED: NASAL DORSUM
LOCATION DETAILED: LEFT INFERIOR FOREHEAD
LOCATION DETAILED: GLABELLA
LOCATION DETAILED: LEFT CENTRAL MALAR CHEEK
LOCATION DETAILED: RIGHT LOWER CUTANEOUS LIP
LOCATION DETAILED: LEFT SUPERIOR CENTRAL BUCCAL CHEEK
LOCATION DETAILED: RIGHT CENTRAL BUCCAL CHEEK
LOCATION DETAILED: LEFT CENTRAL BUCCAL CHEEK
LOCATION DETAILED: RIGHT INFERIOR LATERAL FOREHEAD
LOCATION DETAILED: RIGHT MEDIAL FOREHEAD
LOCATION DETAILED: LEFT MEDIAL BUCCAL CHEEK
LOCATION DETAILED: RIGHT SUPERIOR MEDIAL BUCCAL CHEEK
LOCATION DETAILED: LEFT INFERIOR MEDIAL MALAR CHEEK

## 2021-10-11 ASSESSMENT — LOCATION SIMPLE DESCRIPTION DERM
LOCATION SIMPLE: LEFT FOREHEAD
LOCATION SIMPLE: NOSE
LOCATION SIMPLE: GLABELLA
LOCATION SIMPLE: RIGHT LIP
LOCATION SIMPLE: LEFT CHEEK
LOCATION SIMPLE: RIGHT CHEEK
LOCATION SIMPLE: RIGHT FOREHEAD

## 2021-10-11 ASSESSMENT — LOCATION ZONE DERM
LOCATION ZONE: FACE
LOCATION ZONE: NOSE
LOCATION ZONE: LIP

## 2021-10-11 NOTE — PROCEDURE: HYDRAFACIAL
Additional Vacuum Pressure (Won't Render If 0): 0
Consent: Written consent obtained, risks reviewed including but not limited to crusting, scabbing, blistering, scarring, darker or lighter pigmentary change, bruising, and/or incomplete response.
Price (Use Numbers Only, No Special Characters Or $): 260.00
Indication: prominent pores
Detail Level: Zone
Treatment Number: 1
Vacuum Pressure: 22
Glycolic Acid %: 7.5%
Post-Care Instructions: I reviewed with the patient in detail post-care instructions. Patient should stay away from the sun and wear sun protection until treated areas are fully healed.

## 2021-12-15 ENCOUNTER — APPOINTMENT (RX ONLY)
Dept: URBAN - METROPOLITAN AREA CLINIC 53 | Facility: CLINIC | Age: 51
Setting detail: DERMATOLOGY
End: 2021-12-15

## 2021-12-15 DIAGNOSIS — Z41.9 ENCOUNTER FOR PROCEDURE FOR PURPOSES OTHER THAN REMEDYING HEALTH STATE, UNSPECIFIED: ICD-10-CM

## 2021-12-15 PROCEDURE — ? HYDRAFACIAL

## 2021-12-15 ASSESSMENT — LOCATION DETAILED DESCRIPTION DERM
LOCATION DETAILED: RIGHT SUPERIOR LATERAL BUCCAL CHEEK
LOCATION DETAILED: LEFT CHIN
LOCATION DETAILED: RIGHT CENTRAL BUCCAL CHEEK
LOCATION DETAILED: RIGHT LATERAL FOREHEAD
LOCATION DETAILED: LEFT MEDIAL FOREHEAD
LOCATION DETAILED: NASAL DORSUM
LOCATION DETAILED: LEFT INFERIOR LATERAL FOREHEAD
LOCATION DETAILED: RIGHT LATERAL MALAR CHEEK
LOCATION DETAILED: LEFT SUPERIOR CENTRAL BUCCAL CHEEK
LOCATION DETAILED: LEFT MEDIAL MALAR CHEEK
LOCATION DETAILED: LEFT CENTRAL PARIETAL SCALP

## 2021-12-15 ASSESSMENT — LOCATION SIMPLE DESCRIPTION DERM
LOCATION SIMPLE: SCALP
LOCATION SIMPLE: CHIN
LOCATION SIMPLE: NOSE
LOCATION SIMPLE: LEFT FOREHEAD
LOCATION SIMPLE: RIGHT CHEEK
LOCATION SIMPLE: LEFT CHEEK
LOCATION SIMPLE: RIGHT FOREHEAD

## 2021-12-15 ASSESSMENT — LOCATION ZONE DERM
LOCATION ZONE: FACE
LOCATION ZONE: SCALP
LOCATION ZONE: NOSE

## 2021-12-15 NOTE — HPI: OTHER
Condition:: Fine lines wrinkles
Please Describe Your Condition:: Pt was treated with deluxe hydrafacial with boosters brightalice and vitamin c .

## 2021-12-15 NOTE — PROCEDURE: HYDRAFACIAL
Vacuum Pressure Low Setting (Will Not Render If Set To 0): 0
Price (Use Numbers Only, No Special Characters Or $): 260.00
Location Override: deluxe
Solution: Activ-4
Solution Override
Procedure: Extend and Protect
Tip Override
Treatment Number: 1
Indication: prominent pores
Tip: Hydropeel Tip, Teal
Procedure: Extraction
Consent: Written consent obtained, risks reviewed including but not limited to crusting, scabbing, blistering, scarring, darker or lighter pigmentary change, bruising, and/or incomplete response.
Procedure: Peel
Post-Care Instructions: I reviewed with the patient in detail post-care instructions. Patient should stay away from the sun and wear sun protection until treated areas are fully healed.
Tip: Hydropeel Tip, Blue
Procedure: Exfoliation
Procedure: Boost
Solution: Beta-HD
Location: other
Tip: Hydropeel Tip, Clear
Procedure: Fusion
Solution: GlySal 7.5%

## 2022-05-31 NOTE — TELEPHONE ENCOUNTER
Dr Layo Iqbal - Dr Beth foote - Day Das is cancer  Results are in [address] [place] [address] Rd  no

## 2022-07-28 ENCOUNTER — APPOINTMENT (RX ONLY)
Dept: URBAN - METROPOLITAN AREA CLINIC 54 | Facility: CLINIC | Age: 52
Setting detail: DERMATOLOGY
End: 2022-07-28

## 2022-07-28 DIAGNOSIS — D22 MELANOCYTIC NEVI: ICD-10-CM | Status: STABLE

## 2022-07-28 DIAGNOSIS — L82.1 OTHER SEBORRHEIC KERATOSIS: ICD-10-CM | Status: STABLE

## 2022-07-28 DIAGNOSIS — L81.4 OTHER MELANIN HYPERPIGMENTATION: ICD-10-CM | Status: STABLE

## 2022-07-28 DIAGNOSIS — L85.3 XEROSIS CUTIS: ICD-10-CM | Status: STABLE

## 2022-07-28 PROBLEM — D22.5 MELANOCYTIC NEVI OF TRUNK: Status: ACTIVE | Noted: 2022-07-28

## 2022-07-28 PROCEDURE — ? SCREENING FOR COVID-19

## 2022-07-28 PROCEDURE — ? COUNSELING

## 2022-07-28 PROCEDURE — 99213 OFFICE O/P EST LOW 20 MIN: CPT

## 2022-07-28 PROCEDURE — ? SUNSCREEN RECOMMENDATIONS

## 2022-07-28 PROCEDURE — ? FULL BODY SKIN EXAM

## 2022-07-28 ASSESSMENT — LOCATION DETAILED DESCRIPTION DERM
LOCATION DETAILED: PERIUMBILICAL SKIN
LOCATION DETAILED: LEFT DISTAL CALF
LOCATION DETAILED: RIGHT MID-UPPER BACK
LOCATION DETAILED: LEFT MID-UPPER BACK

## 2022-07-28 ASSESSMENT — LOCATION SIMPLE DESCRIPTION DERM
LOCATION SIMPLE: LEFT CALF
LOCATION SIMPLE: LEFT UPPER BACK
LOCATION SIMPLE: ABDOMEN
LOCATION SIMPLE: RIGHT UPPER BACK

## 2022-07-28 ASSESSMENT — LOCATION ZONE DERM
LOCATION ZONE: LEG
LOCATION ZONE: TRUNK

## 2023-07-25 ENCOUNTER — APPOINTMENT (RX ONLY)
Dept: URBAN - METROPOLITAN AREA CLINIC 61 | Facility: CLINIC | Age: 53
Setting detail: DERMATOLOGY
End: 2023-07-25

## 2023-07-25 DIAGNOSIS — Z80.8 FAMILY HISTORY OF MALIGNANT NEOPLASM OF OTHER ORGANS OR SYSTEMS: ICD-10-CM

## 2023-07-25 DIAGNOSIS — L82.1 OTHER SEBORRHEIC KERATOSIS: ICD-10-CM | Status: STABLE

## 2023-07-25 DIAGNOSIS — D22 MELANOCYTIC NEVI: ICD-10-CM | Status: STABLE

## 2023-07-25 DIAGNOSIS — D18.0 HEMANGIOMA: ICD-10-CM

## 2023-07-25 DIAGNOSIS — L81.4 OTHER MELANIN HYPERPIGMENTATION: ICD-10-CM | Status: INADEQUATELY CONTROLLED

## 2023-07-25 DIAGNOSIS — Z12.83 ENCOUNTER FOR SCREENING FOR MALIGNANT NEOPLASM OF SKIN: ICD-10-CM

## 2023-07-25 PROBLEM — D18.01 HEMANGIOMA OF SKIN AND SUBCUTANEOUS TISSUE: Status: ACTIVE | Noted: 2023-07-25

## 2023-07-25 PROBLEM — D22.5 MELANOCYTIC NEVI OF TRUNK: Status: ACTIVE | Noted: 2023-07-25

## 2023-07-25 PROCEDURE — ? FULL BODY SKIN EXAM

## 2023-07-25 PROCEDURE — 99213 OFFICE O/P EST LOW 20 MIN: CPT

## 2023-07-25 PROCEDURE — ? ADDITIONAL NOTES

## 2023-07-25 PROCEDURE — ? COUNSELING

## 2023-07-25 PROCEDURE — ? PRESCRIPTION

## 2023-07-25 RX ORDER — HYDROQUINONE 40 MG/G
CREAM TOPICAL
Qty: 28.35 | Refills: 1 | Status: ERX

## 2023-07-25 ASSESSMENT — LOCATION DETAILED DESCRIPTION DERM
LOCATION DETAILED: SUPERIOR THORACIC SPINE
LOCATION DETAILED: RIGHT INFERIOR MEDIAL UPPER BACK
LOCATION DETAILED: LEFT MEDIAL UPPER BACK
LOCATION DETAILED: RIGHT RIB CAGE
LOCATION DETAILED: PERIUMBILICAL SKIN
LOCATION DETAILED: UPPER STERNUM

## 2023-07-25 ASSESSMENT — LOCATION SIMPLE DESCRIPTION DERM
LOCATION SIMPLE: UPPER BACK
LOCATION SIMPLE: CHEST
LOCATION SIMPLE: RIGHT UPPER BACK
LOCATION SIMPLE: ABDOMEN
LOCATION SIMPLE: LEFT UPPER BACK

## 2023-07-25 ASSESSMENT — LOCATION ZONE DERM: LOCATION ZONE: TRUNK

## 2024-02-08 NOTE — PROGRESS NOTES
Patient contacted me via email on 7/21/20 to discuss side effect of Xeloda that she was experiencing  She stated "I have been taking the chemo pills for 5 days now and am experiencing significant constipation  I have been using Miralax, fiber pills and even had to take dulcolax last night  I have gone but its been rough  I dont know what more I can do but Im uncomfortable due to the bloating   any suggestions? I still have 9 more days on the pills   "  Discussed with Dr Parul Henriquez and Jeison Garcia RN for more suggestions as the basic ones were not helping her  Patient was recommended to take Magnesium Citrate for constipation  I did not hear anything about the resolution in regards to the constipation as patient is very responding to email and contacts office for any side effects she may have  8/4/20 email received from patient "My appointment was moved to tomorrow due to weather but I thought you could give Dr Parul Henriquez a heads up of some major issues we need to resolve before my next treatment this Thursday  I sent you a message regarding constipation and unfortunately nothing helped  Karrie Nissen Karrie Nissen I took everything you could possibly think of including your recommendations    and was still backed up so bad that last Thursday which was my first day without the pill that I was throwing up  I finally took a holistic vitamin and I was able to go but I was sick for 3 days   without exaggeration I went 30+ times in the 3 days  I didnt start to feel better until Monday  I now start this process all over again this Thursday  We need to solve for the constipation issue  The second major issue is dizziness  Karrie Nissen Karrie Nissen I am working full-time and often find myself very dizzy  The nausea meds cause it but so does the chemo pills   so a double whammy! I need to be able to drive  Pura Delgadilloen Karrie Nissen Im by myself so it makes it hard when I cant focus or function  I am wondering if the dosage of the chemo pills are too high      I noticed that Patient with potassium of 3.2 on admission  Received 40 mill equivalents potassium in the ED  Continue isolate IVF  Recheck BMP in the a.m.   everything I read that even larger men are taking the same dosage as me  Can this be cut back to 4 pills vs  6? Lastly the nausea was bad  Yael Prows Yael Prows I finished thru the entire bottle of meds and started a second  I even felt nauseous up until yesterday    Im hoping we can find resolution especially for the constipation  This cant be healthy given my condition  "      Discussed Compazine with patient and sent script to Dr Lianet Perez to sign  This is to be sent to [facility]  Also discussed with Epi Gastelum RN in regards to another suggestion for this patient  Palliative care was recommended  I explained the role of palliative care to this patient as they are typically used for side effect management/treatment management  This is standard care for patients that have side effects not easily controlled by basic interventions  Patient has resolved all options for constipation management  However, patient declined palliative care  She stated that her symptoms are severe and that she "does not want to go the recommended care " She was "offended by the recommendation" and says "this is not the answer " I again explained the role of palliative care as there is nothing else that Dr Lianet Perez can do for constipation management  Patient will discuss these concerns with Dr Lianet Perez tomorrow 8/5 at her office visit appointment

## 2024-03-12 ENCOUNTER — APPOINTMENT (RX ONLY)
Dept: URBAN - METROPOLITAN AREA CLINIC 61 | Facility: CLINIC | Age: 54
Setting detail: DERMATOLOGY
End: 2024-03-12

## 2024-03-12 DIAGNOSIS — L81.4 OTHER MELANIN HYPERPIGMENTATION: ICD-10-CM

## 2024-03-12 DIAGNOSIS — L82.1 OTHER SEBORRHEIC KERATOSIS: ICD-10-CM

## 2024-03-12 DIAGNOSIS — L82.0 INFLAMED SEBORRHEIC KERATOSIS: ICD-10-CM

## 2024-03-12 DIAGNOSIS — D22 MELANOCYTIC NEVI: ICD-10-CM

## 2024-03-12 PROBLEM — D22.71 MELANOCYTIC NEVI OF RIGHT LOWER LIMB, INCLUDING HIP: Status: ACTIVE | Noted: 2024-03-12

## 2024-03-12 PROCEDURE — ? LIQUID NITROGEN

## 2024-03-12 PROCEDURE — ? TREATMENT REGIMEN

## 2024-03-12 PROCEDURE — ? COUNSELING

## 2024-03-12 PROCEDURE — 17110 DESTRUCTION B9 LES UP TO 14: CPT

## 2024-03-12 PROCEDURE — ? FULL BODY SKIN EXAM - DECLINED

## 2024-03-12 PROCEDURE — 99213 OFFICE O/P EST LOW 20 MIN: CPT | Mod: 25

## 2024-03-12 ASSESSMENT — LOCATION SIMPLE DESCRIPTION DERM
LOCATION SIMPLE: LEFT CHEEK
LOCATION SIMPLE: LEFT UPPER BACK
LOCATION SIMPLE: LEFT THIGH
LOCATION SIMPLE: RIGHT CHEEK
LOCATION SIMPLE: RIGHT THIGH

## 2024-03-12 ASSESSMENT — LOCATION DETAILED DESCRIPTION DERM
LOCATION DETAILED: RIGHT INFERIOR CENTRAL MALAR CHEEK
LOCATION DETAILED: LEFT MEDIAL MALAR CHEEK
LOCATION DETAILED: LEFT ANTERIOR DISTAL THIGH
LOCATION DETAILED: LEFT MEDIAL UPPER BACK
LOCATION DETAILED: RIGHT CENTRAL MALAR CHEEK
LOCATION DETAILED: RIGHT ANTERIOR PROXIMAL THIGH

## 2024-03-12 ASSESSMENT — LOCATION ZONE DERM
LOCATION ZONE: FACE
LOCATION ZONE: TRUNK
LOCATION ZONE: LEG

## 2024-03-12 NOTE — PROCEDURE: LIQUID NITROGEN
Show Applicator Variable?: Yes
Render Note In Bullet Format When Appropriate: No
Medical Necessity Clause: This procedure was medically necessary because the lesions that were treated were:
Medical Necessity Information: It is in your best interest to select a reason for this procedure from the list below. All of these items fulfill various CMS LCD requirements except the new and changing color options.
Spray Paint Text: The liquid nitrogen was applied to the skin utilizing a spray paint frosting technique.
Detail Level: Simple
Post-Care Instructions: I reviewed with the patient in detail post-care instructions. Patient is to wear sunprotection, and avoid picking at any of the treated lesions. Pt may apply Vaseline to crusted or scabbing areas.
Consent: The patient's consent was obtained including but not limited to risks of crusting, scabbing, blistering, scarring, darker or lighter pigmentary change, recurrence, incomplete removal and infection.

## 2024-03-12 NOTE — PROCEDURE: TREATMENT REGIMEN
Otc Regimen: Recommend SPF 30+ daily and reapplication every 2 hours when outdoors.
Detail Level: Zone

## 2024-03-12 NOTE — HPI: OTHER
Condition:: irritated skin lesion
Please Describe Your Condition:: Irritated skin lesion right cheek x months

## 2024-07-23 ENCOUNTER — APPOINTMENT (RX ONLY)
Dept: URBAN - METROPOLITAN AREA CLINIC 61 | Facility: CLINIC | Age: 54
Setting detail: DERMATOLOGY
End: 2024-07-23

## 2024-07-23 DIAGNOSIS — Z12.83 ENCOUNTER FOR SCREENING FOR MALIGNANT NEOPLASM OF SKIN: ICD-10-CM

## 2024-07-23 DIAGNOSIS — L82.1 OTHER SEBORRHEIC KERATOSIS: ICD-10-CM | Status: STABLE

## 2024-07-23 DIAGNOSIS — D22 MELANOCYTIC NEVI: ICD-10-CM | Status: UNCHANGED

## 2024-07-23 DIAGNOSIS — Z80.8 FAMILY HISTORY OF MALIGNANT NEOPLASM OF OTHER ORGANS OR SYSTEMS: ICD-10-CM

## 2024-07-23 DIAGNOSIS — L81.4 OTHER MELANIN HYPERPIGMENTATION: ICD-10-CM | Status: UNCHANGED

## 2024-07-23 DIAGNOSIS — D18.0 HEMANGIOMA: ICD-10-CM

## 2024-07-23 PROBLEM — D22.71 MELANOCYTIC NEVI OF RIGHT LOWER LIMB, INCLUDING HIP: Status: ACTIVE | Noted: 2024-07-23

## 2024-07-23 PROBLEM — D22.5 MELANOCYTIC NEVI OF TRUNK: Status: ACTIVE | Noted: 2024-07-23

## 2024-07-23 PROBLEM — D18.01 HEMANGIOMA OF SKIN AND SUBCUTANEOUS TISSUE: Status: ACTIVE | Noted: 2024-07-23

## 2024-07-23 PROCEDURE — ? COUNSELING

## 2024-07-23 PROCEDURE — ? FULL BODY SKIN EXAM

## 2024-07-23 PROCEDURE — ? TREATMENT REGIMEN

## 2024-07-23 PROCEDURE — ? ADDITIONAL NOTES

## 2024-07-23 PROCEDURE — 99213 OFFICE O/P EST LOW 20 MIN: CPT

## 2024-07-23 ASSESSMENT — LOCATION SIMPLE DESCRIPTION DERM
LOCATION SIMPLE: LEFT UPPER BACK
LOCATION SIMPLE: RIGHT THIGH
LOCATION SIMPLE: RIGHT UPPER BACK
LOCATION SIMPLE: CHEST
LOCATION SIMPLE: ABDOMEN
LOCATION SIMPLE: UPPER BACK

## 2024-07-23 ASSESSMENT — LOCATION ZONE DERM
LOCATION ZONE: LEG
LOCATION ZONE: TRUNK

## 2024-07-23 ASSESSMENT — LOCATION DETAILED DESCRIPTION DERM
LOCATION DETAILED: RIGHT ANTERIOR PROXIMAL THIGH
LOCATION DETAILED: RIGHT INFERIOR MEDIAL UPPER BACK
LOCATION DETAILED: LEFT MEDIAL UPPER BACK
LOCATION DETAILED: PERIUMBILICAL SKIN
LOCATION DETAILED: UPPER STERNUM
LOCATION DETAILED: SUPERIOR THORACIC SPINE

## 2025-02-25 ENCOUNTER — APPOINTMENT (OUTPATIENT)
Dept: URBAN - METROPOLITAN AREA CLINIC 61 | Facility: CLINIC | Age: 55
Setting detail: DERMATOLOGY
End: 2025-02-25

## 2025-02-25 DIAGNOSIS — Z12.83 ENCOUNTER FOR SCREENING FOR MALIGNANT NEOPLASM OF SKIN: ICD-10-CM

## 2025-02-25 DIAGNOSIS — L82.1 OTHER SEBORRHEIC KERATOSIS: ICD-10-CM

## 2025-02-25 DIAGNOSIS — D18.0 HEMANGIOMA: ICD-10-CM

## 2025-02-25 DIAGNOSIS — L81.4 OTHER MELANIN HYPERPIGMENTATION: ICD-10-CM

## 2025-02-25 DIAGNOSIS — D22 MELANOCYTIC NEVI: ICD-10-CM

## 2025-02-25 DIAGNOSIS — L82.0 INFLAMED SEBORRHEIC KERATOSIS: ICD-10-CM

## 2025-02-25 PROBLEM — D22.5 MELANOCYTIC NEVI OF TRUNK: Status: ACTIVE | Noted: 2025-02-25

## 2025-02-25 PROBLEM — D18.01 HEMANGIOMA OF SKIN AND SUBCUTANEOUS TISSUE: Status: ACTIVE | Noted: 2025-02-25

## 2025-02-25 PROCEDURE — 17110 DESTRUCTION B9 LES UP TO 14: CPT

## 2025-02-25 PROCEDURE — ? TREATMENT REGIMEN

## 2025-02-25 PROCEDURE — ? ADDITIONAL NOTES

## 2025-02-25 PROCEDURE — ? LIQUID NITROGEN

## 2025-02-25 PROCEDURE — ? COUNSELING

## 2025-02-25 PROCEDURE — 99213 OFFICE O/P EST LOW 20 MIN: CPT | Mod: 25

## 2025-02-25 ASSESSMENT — LOCATION DETAILED DESCRIPTION DERM
LOCATION DETAILED: UPPER STERNUM
LOCATION DETAILED: RIGHT SUPERIOR FOREHEAD
LOCATION DETAILED: LEFT MEDIAL UPPER BACK
LOCATION DETAILED: PERIUMBILICAL SKIN
LOCATION DETAILED: RIGHT INFERIOR MEDIAL UPPER BACK
LOCATION DETAILED: SUPERIOR THORACIC SPINE

## 2025-02-25 ASSESSMENT — LOCATION ZONE DERM
LOCATION ZONE: FACE
LOCATION ZONE: TRUNK

## 2025-02-25 ASSESSMENT — LOCATION SIMPLE DESCRIPTION DERM
LOCATION SIMPLE: LEFT UPPER BACK
LOCATION SIMPLE: RIGHT FOREHEAD
LOCATION SIMPLE: UPPER BACK
LOCATION SIMPLE: ABDOMEN
LOCATION SIMPLE: CHEST
LOCATION SIMPLE: RIGHT UPPER BACK

## 2025-02-25 NOTE — PROCEDURE: LIQUID NITROGEN
Post-Care Instructions: I reviewed with the patient in detail post-care instructions. Patient is to wear sunprotection, and avoid picking at any of the treated lesions. Pt may apply Vaseline to crusted or scabbing areas.
Show Applicator Variable?: Yes
Render Post-Care Instructions In Note?: no
Medical Necessity Information: It is in your best interest to select a reason for this procedure from the list below. All of these items fulfill various CMS LCD requirements except the new and changing color options.
Spray Paint Text: The liquid nitrogen was applied to the skin utilizing a spray paint frosting technique.
Consent: The patient's consent was obtained including but not limited to risks of crusting, scabbing, blistering, scarring, darker or lighter pigmentary change, recurrence, incomplete removal and infection.
Detail Level: Simple
Medical Necessity Clause: This procedure was medically necessary because the lesions that were treated were:

## 2025-06-19 ENCOUNTER — APPOINTMENT (OUTPATIENT)
Dept: URBAN - METROPOLITAN AREA CLINIC 61 | Facility: CLINIC | Age: 55
Setting detail: DERMATOLOGY
End: 2025-06-19

## 2025-06-19 DIAGNOSIS — L81.4 OTHER MELANIN HYPERPIGMENTATION: ICD-10-CM

## 2025-06-19 DIAGNOSIS — L82.0 INFLAMED SEBORRHEIC KERATOSIS: ICD-10-CM

## 2025-06-19 DIAGNOSIS — D18.0 HEMANGIOMA: ICD-10-CM

## 2025-06-19 DIAGNOSIS — L82.1 OTHER SEBORRHEIC KERATOSIS: ICD-10-CM

## 2025-06-19 DIAGNOSIS — T07XXXA INSECT BITE, NONVENOMOUS, OF OTHER, MULTIPLE, AND UNSPECIFIED SITES, WITHOUT MENTION OF INFECTION: ICD-10-CM

## 2025-06-19 DIAGNOSIS — D22 MELANOCYTIC NEVI: ICD-10-CM

## 2025-06-19 DIAGNOSIS — Z12.83 ENCOUNTER FOR SCREENING FOR MALIGNANT NEOPLASM OF SKIN: ICD-10-CM

## 2025-06-19 PROBLEM — D22.5 MELANOCYTIC NEVI OF TRUNK: Status: ACTIVE | Noted: 2025-06-19

## 2025-06-19 PROBLEM — D18.01 HEMANGIOMA OF SKIN AND SUBCUTANEOUS TISSUE: Status: ACTIVE | Noted: 2025-06-19

## 2025-06-19 PROBLEM — S50.861A INSECT BITE (NONVENOMOUS) OF RIGHT FOREARM, INITIAL ENCOUNTER: Status: ACTIVE | Noted: 2025-06-19

## 2025-06-19 PROCEDURE — ? COUNSELING

## 2025-06-19 PROCEDURE — ? ADDITIONAL NOTES

## 2025-06-19 PROCEDURE — ? PRESCRIPTION

## 2025-06-19 PROCEDURE — ? TREATMENT REGIMEN

## 2025-06-19 PROCEDURE — ? LIQUID NITROGEN

## 2025-06-19 RX ORDER — TRIAMCINOLONE ACETONIDE 1 MG/G
1 CREAM TOPICAL BID
Qty: 80 | Refills: 2 | Status: ERX | COMMUNITY
Start: 2025-06-19

## 2025-06-19 RX ADMIN — TRIAMCINOLONE ACETONIDE 1: 1 CREAM TOPICAL at 00:00

## 2025-06-19 ASSESSMENT — LOCATION SIMPLE DESCRIPTION DERM
LOCATION SIMPLE: CHEST
LOCATION SIMPLE: LEFT ZYGOMA
LOCATION SIMPLE: LEFT UPPER BACK
LOCATION SIMPLE: RIGHT UPPER BACK
LOCATION SIMPLE: RIGHT FOREARM
LOCATION SIMPLE: UPPER BACK
LOCATION SIMPLE: ABDOMEN

## 2025-06-19 ASSESSMENT — LOCATION DETAILED DESCRIPTION DERM
LOCATION DETAILED: PERIUMBILICAL SKIN
LOCATION DETAILED: SUPERIOR THORACIC SPINE
LOCATION DETAILED: LEFT CENTRAL ZYGOMA
LOCATION DETAILED: LEFT MEDIAL UPPER BACK
LOCATION DETAILED: RIGHT RIB CAGE
LOCATION DETAILED: RIGHT INFERIOR MEDIAL UPPER BACK
LOCATION DETAILED: RIGHT PROXIMAL DORSAL FOREARM
LOCATION DETAILED: UPPER STERNUM

## 2025-06-19 ASSESSMENT — LOCATION ZONE DERM
LOCATION ZONE: FACE
LOCATION ZONE: TRUNK
LOCATION ZONE: ARM

## 2025-06-19 NOTE — PROCEDURE: LIQUID NITROGEN
Detail Level: Simple
Show Spray Paint Technique Variable?: Yes
Medical Necessity Information: It is in your best interest to select a reason for this procedure from the list below. All of these items fulfill various CMS LCD requirements except the new and changing color options.
Add 52 Modifier (Optional): no
Spray Paint Text: The liquid nitrogen was applied to the skin utilizing a spray paint frosting technique.
Medical Necessity Clause: This procedure was medically necessary because the lesions that were treated were:
Consent: The patient's consent was obtained including but not limited to risks of crusting, scabbing, blistering, scarring, darker or lighter pigmentary change, recurrence, incomplete removal and infection.
Post-Care Instructions: I reviewed with the patient in detail post-care instructions. Patient is to wear sunprotection, and avoid picking at any of the treated lesions. Pt may apply Vaseline to crusted or scabbing areas.

## (undated) DEVICE — TRAVELKIT CONTAINS FIRST STEP KIT (200ML EP-4 KIT) AND SOILED SCOPE BAG - 1 KIT: Brand: TRAVELKIT CONTAINS FIRST STEP KIT AND SOILED SCOPE BAG

## (undated) DEVICE — TROCAR: Brand: KII FIOS FIRST ENTRY

## (undated) DEVICE — INSUFLATION TUBING INSUFLOW (LEXION)

## (undated) DEVICE — PLUMEPEN PRO 10FT

## (undated) DEVICE — CO2 AND WATER TUBING/CAP SET FOR OLYMPUS® SCOPES & UCR: Brand: ERBE

## (undated) DEVICE — TRAY FOLEY 16FR URIMETER SILICONE SURESTEP

## (undated) DEVICE — TROCAR: Brand: KII SLEEVE

## (undated) DEVICE — SUT VICRYL 2-0 SH 27 IN UNDYED J417H

## (undated) DEVICE — ACCESS PLATFORM FOR MINIMALLY INVASIVE SURGERY.: Brand: GELPORT® LAPAROSCOPIC  SYSTEM

## (undated) DEVICE — CONTOUR CURVED CUTTER STAPLER: Brand: CONTOUR

## (undated) DEVICE — SUT PDS II 1 CTX 36 IN Z371T

## (undated) DEVICE — GLOVE SRG BIOGEL 8

## (undated) DEVICE — TROCAR: Brand: KII® SLEEVE

## (undated) DEVICE — VISUALIZATION SYSTEM: Brand: CLEARIFY

## (undated) DEVICE — ELECTRODE BLADE MOD  E-Z CLEAN 6.5IN -0014M

## (undated) DEVICE — TUBING SMOKE EVAC W/FILTRATION DEVICE PLUMEPORT ACTIV

## (undated) DEVICE — 60 ML SYRINGE,REGULAR TIP: Brand: MONOJECT

## (undated) DEVICE — IRRIG ENDO FLO TUBING

## (undated) DEVICE — PACK PBDS STERILE LAP LITHOTOMY RF

## (undated) DEVICE — 3M™ IOBAN™ 2 ANTIMICROBIAL INCISE DRAPE 6650EZ: Brand: IOBAN™ 2

## (undated) DEVICE — ENSEAL LAPAROSCOPIC TISSUE SEALER G2 CURVED JAW FOR USE WITH G2 GENERATOR 5MM DIAMETER 35CM SHAFT LENGTH: Brand: ENSEAL

## (undated) DEVICE — CONTOUR CURVED CUTTER STAPLER RELOAD: Brand: CONTOUR

## (undated) DEVICE — SUT PROLENE 2-0 SH 36 IN 8523H

## (undated) DEVICE — SUT MONOCRYL 4-0 PS-2 18 IN Y496G

## (undated) DEVICE — MEDI-VAC YANK SUCT HNDL W/TPRD BULBOUS TIP: Brand: CARDINAL HEALTH

## (undated) DEVICE — 40595 XL TRENDELENBURG POSITIONING KIT: Brand: 40595 XL TRENDELENBURG POSITIONING KIT

## (undated) DEVICE — INTENDED FOR TISSUE SEPARATION, AND OTHER PROCEDURES THAT REQUIRE A SHARP SURGICAL BLADE TO PUNCTURE OR CUT.: Brand: BARD-PARKER SAFETY BLADES SIZE 15, STERILE

## (undated) DEVICE — STERILE EMESIS BASIN                 070: Brand: CARDINAL HEALTH

## (undated) DEVICE — SUT VICRYL 0 54 IN J207G

## (undated) DEVICE — SPY ELITE SINGLE VIAL KIT

## (undated) DEVICE — SUT VICRYL 3-0 SH 27 IN J416H

## (undated) DEVICE — GLOVE INDICATOR PI UNDERGLOVE SZ 8.5 BLUE

## (undated) DEVICE — CHLORAPREP HI-LITE 26ML ORANGE

## (undated) DEVICE — ENDOSCOPIC CURVED INTRALUMINAL STAPLER (ILS) 24 TITANIUM ADJUSTABLE HEIGHT STAPLES

## (undated) DEVICE — UNDER BUTTOCKS DRAPE W/FLUID CONTROL POUCH: Brand: CONVERTORS

## (undated) DEVICE — 3000CC GUARDIAN II: Brand: GUARDIAN

## (undated) DEVICE — SPONGE STICK WITH PVP-I: Brand: KENDALL

## (undated) DEVICE — Device: Brand: DEFENDO AIR/WATER/SUCTION AND BIOPSY VALVE

## (undated) DEVICE — ADHESIVE SKIN HIGH VISCOSITY EXOFIN 1ML